# Patient Record
Sex: FEMALE | Race: WHITE | NOT HISPANIC OR LATINO | Employment: OTHER | URBAN - NONMETROPOLITAN AREA
[De-identification: names, ages, dates, MRNs, and addresses within clinical notes are randomized per-mention and may not be internally consistent; named-entity substitution may affect disease eponyms.]

---

## 2017-01-09 ENCOUNTER — TRANSCRIBE ORDERS (OUTPATIENT)
Dept: ADMINISTRATIVE | Facility: HOSPITAL | Age: 57
End: 2017-01-09

## 2017-01-09 DIAGNOSIS — Z13.9 SCREENING: Primary | ICD-10-CM

## 2017-01-26 ENCOUNTER — HOSPITAL ENCOUNTER (OUTPATIENT)
Dept: MAMMOGRAPHY | Facility: HOSPITAL | Age: 57
Discharge: HOME | End: 2017-01-26
Admitting: NURSE PRACTITIONER

## 2017-01-26 DIAGNOSIS — Z13.9 SCREENING: ICD-10-CM

## 2017-01-26 PROCEDURE — 77063 BREAST TOMOSYNTHESIS BI: CPT

## 2017-01-26 PROCEDURE — G0202 SCR MAMMO BI INCL CAD: HCPCS

## 2017-02-09 ENCOUNTER — TRANSCRIBE ORDERS (OUTPATIENT)
Dept: GENERAL RADIOLOGY | Facility: HOSPITAL | Age: 57
End: 2017-02-09

## 2017-02-09 ENCOUNTER — HOSPITAL ENCOUNTER (OUTPATIENT)
Dept: GENERAL RADIOLOGY | Facility: HOSPITAL | Age: 57
Discharge: HOME | End: 2017-02-09
Admitting: NURSE PRACTITIONER

## 2017-02-09 DIAGNOSIS — R05.9 COUGH: ICD-10-CM

## 2017-02-09 DIAGNOSIS — R05.9 COUGH: Primary | ICD-10-CM

## 2017-02-09 PROCEDURE — 71020 HC CHEST PA AND LATERAL: CPT

## 2017-04-21 ENCOUNTER — TRANSCRIBE ORDERS (OUTPATIENT)
Dept: ULTRASOUND IMAGING | Facility: HOSPITAL | Age: 57
End: 2017-04-21

## 2017-04-21 DIAGNOSIS — R63.4 ABNORMAL WEIGHT LOSS: Primary | ICD-10-CM

## 2017-04-24 ENCOUNTER — APPOINTMENT (OUTPATIENT)
Dept: ULTRASOUND IMAGING | Facility: HOSPITAL | Age: 57
End: 2017-04-24

## 2017-04-28 ENCOUNTER — APPOINTMENT (OUTPATIENT)
Dept: GENERAL RADIOLOGY | Facility: HOSPITAL | Age: 57
End: 2017-04-28

## 2017-04-28 ENCOUNTER — APPOINTMENT (OUTPATIENT)
Dept: ULTRASOUND IMAGING | Facility: HOSPITAL | Age: 57
End: 2017-04-28

## 2017-04-28 ENCOUNTER — APPOINTMENT (OUTPATIENT)
Dept: CT IMAGING | Facility: HOSPITAL | Age: 57
End: 2017-04-28

## 2017-04-28 ENCOUNTER — HOSPITAL ENCOUNTER (INPATIENT)
Facility: HOSPITAL | Age: 57
LOS: 2 days | Discharge: HOME | End: 2017-05-01
Attending: EMERGENCY MEDICINE | Admitting: INTERNAL MEDICINE

## 2017-04-28 DIAGNOSIS — K85.90 ACUTE PANCREATITIS WITHOUT INFECTION OR NECROSIS, UNSPECIFIED PANCREATITIS TYPE: Primary | ICD-10-CM

## 2017-04-28 DIAGNOSIS — K85.00 IDIOPATHIC ACUTE PANCREATITIS WITHOUT INFECTION OR NECROSIS: ICD-10-CM

## 2017-04-28 LAB
ALBUMIN SERPL-MCNC: 3.6 G/DL (ref 3.5–5)
ALBUMIN/GLOB SERPL: 1.1 G/DL (ref 1–2)
ALP SERPL-CCNC: 197 U/L (ref 38–126)
ALT SERPL W P-5'-P-CCNC: 66 U/L (ref 13–69)
AMMONIA BLD-SCNC: <9 UMOL/L (ref 9–30)
ANION GAP SERPL CALCULATED.3IONS-SCNC: 14.9 MMOL/L
AST SERPL-CCNC: 92 U/L (ref 15–46)
BACTERIA UR QL AUTO: ABNORMAL /HPF
BASOPHILS # BLD AUTO: 0.02 10*3/MM3 (ref 0–0.2)
BASOPHILS NFR BLD AUTO: 0.2 % (ref 0–2.5)
BILIRUB SERPL-MCNC: 1.4 MG/DL (ref 0.2–1.3)
BILIRUB UR QL STRIP: NEGATIVE
BUN BLD-MCNC: 15 MG/DL (ref 7–20)
BUN/CREAT SERPL: 18.8 (ref 7.1–23.5)
CALCIUM SPEC-SCNC: 8.7 MG/DL (ref 8.4–10.2)
CHLORIDE SERPL-SCNC: 92 MMOL/L (ref 98–107)
CLARITY UR: CLEAR
CO2 SERPL-SCNC: 25 MMOL/L (ref 26–30)
COLOR UR: YELLOW
CREAT BLD-MCNC: 0.8 MG/DL (ref 0.6–1.3)
DEPRECATED RDW RBC AUTO: 45.7 FL (ref 37–54)
EOSINOPHIL # BLD AUTO: 0.01 10*3/MM3 (ref 0–0.7)
EOSINOPHIL NFR BLD AUTO: 0.1 % (ref 0–7)
ERYTHROCYTE [DISTWIDTH] IN BLOOD BY AUTOMATED COUNT: 12.7 % (ref 11.5–14.5)
GFR SERPL CREATININE-BSD FRML MDRD: 74 ML/MIN/1.73
GLOBULIN UR ELPH-MCNC: 3.4 GM/DL
GLUCOSE BLD-MCNC: 122 MG/DL (ref 74–98)
GLUCOSE BLDC GLUCOMTR-MCNC: 172 MG/DL (ref 70–130)
GLUCOSE UR STRIP-MCNC: NEGATIVE MG/DL
HCT VFR BLD AUTO: 32.6 % (ref 37–47)
HGB BLD-MCNC: 11.8 G/DL (ref 12–16)
HGB UR QL STRIP.AUTO: ABNORMAL
HOLD SPECIMEN: NORMAL
HOLD SPECIMEN: NORMAL
HYALINE CASTS UR QL AUTO: ABNORMAL /LPF
IMM GRANULOCYTES # BLD: 0.02 10*3/MM3 (ref 0–0.06)
IMM GRANULOCYTES NFR BLD: 0.2 % (ref 0–0.6)
INR PPP: 0.93 (ref 0.9–1.1)
KETONES UR QL STRIP: NEGATIVE
LEUKOCYTE ESTERASE UR QL STRIP.AUTO: NEGATIVE
LIPASE SERPL-CCNC: 585 U/L (ref 23–300)
LYMPHOCYTES # BLD AUTO: 1.35 10*3/MM3 (ref 0.6–3.4)
LYMPHOCYTES NFR BLD AUTO: 15.3 % (ref 10–50)
MCH RBC QN AUTO: 35.4 PG (ref 27–31)
MCHC RBC AUTO-ENTMCNC: 36.2 G/DL (ref 30–37)
MCV RBC AUTO: 97.9 FL (ref 81–99)
MONOCYTES # BLD AUTO: 0.31 10*3/MM3 (ref 0–0.9)
MONOCYTES NFR BLD AUTO: 3.5 % (ref 0–12)
NEUTROPHILS # BLD AUTO: 7.13 10*3/MM3 (ref 2–6.9)
NEUTROPHILS NFR BLD AUTO: 80.7 % (ref 37–80)
NITRITE UR QL STRIP: NEGATIVE
NRBC BLD MANUAL-RTO: 0 /100 WBC (ref 0–0)
PH UR STRIP.AUTO: 7 [PH] (ref 5–8)
PLATELET # BLD AUTO: 114 10*3/MM3 (ref 130–400)
PMV BLD AUTO: 10.5 FL (ref 6–12)
POTASSIUM BLD-SCNC: 2.9 MMOL/L (ref 3.5–5.1)
PROT SERPL-MCNC: 7 G/DL (ref 6.3–8.2)
PROT UR QL STRIP: NEGATIVE
PROTHROMBIN TIME: 10.2 SECONDS (ref 9.3–12.1)
RBC # BLD AUTO: 3.33 10*6/MM3 (ref 4.2–5.4)
RBC # UR: ABNORMAL /HPF
REF LAB TEST METHOD: ABNORMAL
SODIUM BLD-SCNC: 129 MMOL/L (ref 137–145)
SODIUM UR-SCNC: 6 MMOL/L (ref 30–90)
SP GR UR STRIP: <=1.005 (ref 1–1.03)
SQUAMOUS #/AREA URNS HPF: ABNORMAL /HPF
TROPONIN I SERPL-MCNC: <0.012 NG/ML (ref 0–0.03)
UROBILINOGEN UR QL STRIP: ABNORMAL
WBC NRBC COR # BLD: 8.84 10*3/MM3 (ref 4.8–10.8)
WBC UR QL AUTO: ABNORMAL /HPF
WHOLE BLOOD HOLD SPECIMEN: NORMAL
WHOLE BLOOD HOLD SPECIMEN: NORMAL

## 2017-04-28 PROCEDURE — 25010000002 ENOXAPARIN PER 10 MG: Performed by: INTERNAL MEDICINE

## 2017-04-28 PROCEDURE — 81001 URINALYSIS AUTO W/SCOPE: CPT | Performed by: NURSE PRACTITIONER

## 2017-04-28 PROCEDURE — G0378 HOSPITAL OBSERVATION PER HR: HCPCS

## 2017-04-28 PROCEDURE — 76705 ECHO EXAM OF ABDOMEN: CPT

## 2017-04-28 PROCEDURE — 84300 ASSAY OF URINE SODIUM: CPT | Performed by: INTERNAL MEDICINE

## 2017-04-28 PROCEDURE — 82140 ASSAY OF AMMONIA: CPT | Performed by: NURSE PRACTITIONER

## 2017-04-28 PROCEDURE — 0 IOPAMIDOL 61 % SOLUTION: Performed by: EMERGENCY MEDICINE

## 2017-04-28 PROCEDURE — 99284 EMERGENCY DEPT VISIT MOD MDM: CPT

## 2017-04-28 PROCEDURE — 83690 ASSAY OF LIPASE: CPT | Performed by: NURSE PRACTITIONER

## 2017-04-28 PROCEDURE — 85610 PROTHROMBIN TIME: CPT | Performed by: NURSE PRACTITIONER

## 2017-04-28 PROCEDURE — 25810000003 SODIUM CHLORIDE 0.9 % WITH KCL 40 MEQ/L 40-0.9 MEQ/L-% SOLUTION: Performed by: INTERNAL MEDICINE

## 2017-04-28 PROCEDURE — 84484 ASSAY OF TROPONIN QUANT: CPT | Performed by: NURSE PRACTITIONER

## 2017-04-28 PROCEDURE — 74177 CT ABD & PELVIS W/CONTRAST: CPT

## 2017-04-28 PROCEDURE — 71010 HC CHEST PA OR AP: CPT

## 2017-04-28 PROCEDURE — 99214 OFFICE O/P EST MOD 30 MIN: CPT | Performed by: SURGERY

## 2017-04-28 PROCEDURE — 93005 ELECTROCARDIOGRAM TRACING: CPT

## 2017-04-28 PROCEDURE — 71020 HC CHEST PA AND LATERAL: CPT

## 2017-04-28 PROCEDURE — 99220 PR INITIAL OBSERVATION CARE/DAY 70 MINUTES: CPT | Performed by: INTERNAL MEDICINE

## 2017-04-28 PROCEDURE — 85025 COMPLETE CBC W/AUTO DIFF WBC: CPT | Performed by: NURSE PRACTITIONER

## 2017-04-28 PROCEDURE — 82962 GLUCOSE BLOOD TEST: CPT

## 2017-04-28 PROCEDURE — 80053 COMPREHEN METABOLIC PANEL: CPT | Performed by: NURSE PRACTITIONER

## 2017-04-28 RX ORDER — LORATADINE 10 MG/1
10 CAPSULE, LIQUID FILLED ORAL NIGHTLY
Status: ON HOLD | COMMUNITY
End: 2018-11-12

## 2017-04-28 RX ORDER — ALBUTEROL SULFATE 90 UG/1
2 AEROSOL, METERED RESPIRATORY (INHALATION) EVERY 6 HOURS PRN
Status: ON HOLD | COMMUNITY
End: 2017-05-01

## 2017-04-28 RX ORDER — SODIUM CHLORIDE 0.9 % (FLUSH) 0.9 %
1-10 SYRINGE (ML) INJECTION AS NEEDED
Status: DISCONTINUED | OUTPATIENT
Start: 2017-04-28 | End: 2017-05-01 | Stop reason: HOSPADM

## 2017-04-28 RX ORDER — METHOCARBAMOL 500 MG/1
750 TABLET, FILM COATED ORAL 3 TIMES DAILY
Status: DISCONTINUED | OUTPATIENT
Start: 2017-04-28 | End: 2017-04-29

## 2017-04-28 RX ORDER — AMLODIPINE BESYLATE 5 MG/1
10 TABLET ORAL DAILY
Status: DISCONTINUED | OUTPATIENT
Start: 2017-04-28 | End: 2017-05-01 | Stop reason: HOSPADM

## 2017-04-28 RX ORDER — ESCITALOPRAM OXALATE 10 MG/1
10 TABLET ORAL DAILY
Status: ON HOLD | COMMUNITY
End: 2018-11-12

## 2017-04-28 RX ORDER — IPRATROPIUM BROMIDE AND ALBUTEROL SULFATE 2.5; .5 MG/3ML; MG/3ML
3 SOLUTION RESPIRATORY (INHALATION)
Status: DISCONTINUED | OUTPATIENT
Start: 2017-04-28 | End: 2017-05-01 | Stop reason: HOSPADM

## 2017-04-28 RX ORDER — ISOSORBIDE MONONITRATE 30 MG/1
30 TABLET, EXTENDED RELEASE ORAL DAILY
Status: DISCONTINUED | OUTPATIENT
Start: 2017-04-28 | End: 2017-05-01 | Stop reason: HOSPADM

## 2017-04-28 RX ORDER — GABAPENTIN 400 MG/1
800 CAPSULE ORAL NIGHTLY
Status: DISCONTINUED | OUTPATIENT
Start: 2017-04-28 | End: 2017-04-29

## 2017-04-28 RX ORDER — NICOTINE 21 MG/24HR
1 PATCH, TRANSDERMAL 24 HOURS TRANSDERMAL EVERY 24 HOURS
Status: DISCONTINUED | OUTPATIENT
Start: 2017-04-28 | End: 2017-05-01 | Stop reason: HOSPADM

## 2017-04-28 RX ORDER — ONDANSETRON 2 MG/ML
4 INJECTION INTRAMUSCULAR; INTRAVENOUS EVERY 6 HOURS PRN
Status: DISCONTINUED | OUTPATIENT
Start: 2017-04-28 | End: 2017-05-01 | Stop reason: HOSPADM

## 2017-04-28 RX ORDER — TRAMADOL HYDROCHLORIDE 50 MG/1
50 TABLET ORAL 2 TIMES DAILY
Status: DISCONTINUED | OUTPATIENT
Start: 2017-04-28 | End: 2017-04-29

## 2017-04-28 RX ORDER — ACETAMINOPHEN 325 MG/1
650 TABLET ORAL EVERY 4 HOURS PRN
Status: DISCONTINUED | OUTPATIENT
Start: 2017-04-28 | End: 2017-05-01 | Stop reason: HOSPADM

## 2017-04-28 RX ORDER — SODIUM CHLORIDE 0.9 % (FLUSH) 0.9 %
10 SYRINGE (ML) INJECTION AS NEEDED
Status: DISCONTINUED | OUTPATIENT
Start: 2017-04-28 | End: 2017-05-01 | Stop reason: HOSPADM

## 2017-04-28 RX ORDER — SODIUM CHLORIDE AND POTASSIUM CHLORIDE 300; 900 MG/100ML; MG/100ML
125 INJECTION, SOLUTION INTRAVENOUS CONTINUOUS
Status: DISCONTINUED | OUTPATIENT
Start: 2017-04-28 | End: 2017-04-30

## 2017-04-28 RX ORDER — POTASSIUM CHLORIDE 750 MG/1
40 CAPSULE, EXTENDED RELEASE ORAL ONCE
Status: COMPLETED | OUTPATIENT
Start: 2017-04-28 | End: 2017-04-28

## 2017-04-28 RX ORDER — OXYCODONE AND ACETAMINOPHEN 10; 325 MG/1; MG/1
1 TABLET ORAL 3 TIMES DAILY
Status: DISCONTINUED | OUTPATIENT
Start: 2017-04-28 | End: 2017-04-29

## 2017-04-28 RX ORDER — OMEPRAZOLE 20 MG/1
20 CAPSULE, DELAYED RELEASE ORAL DAILY
Status: ON HOLD | COMMUNITY
End: 2018-11-12

## 2017-04-28 RX ADMIN — ISOSORBIDE MONONITRATE 30 MG: 30 TABLET, EXTENDED RELEASE ORAL at 18:40

## 2017-04-28 RX ADMIN — SODIUM CHLORIDE 1000 ML: 9 INJECTION, SOLUTION INTRAVENOUS at 12:25

## 2017-04-28 RX ADMIN — GABAPENTIN 800 MG: 400 CAPSULE ORAL at 20:35

## 2017-04-28 RX ADMIN — OXYCODONE HYDROCHLORIDE AND ACETAMINOPHEN 1 TABLET: 10; 325 TABLET ORAL at 20:36

## 2017-04-28 RX ADMIN — SODIUM CHLORIDE AND POTASSIUM CHLORIDE 125 ML/HR: 9; 2.98 INJECTION, SOLUTION INTRAVENOUS at 18:45

## 2017-04-28 RX ADMIN — AMLODIPINE BESYLATE 10 MG: 5 TABLET ORAL at 18:40

## 2017-04-28 RX ADMIN — ENOXAPARIN SODIUM 40 MG: 40 INJECTION SUBCUTANEOUS at 18:39

## 2017-04-28 RX ADMIN — IOPAMIDOL 100 ML: 612 INJECTION, SOLUTION INTRAVENOUS at 13:36

## 2017-04-28 RX ADMIN — POTASSIUM CHLORIDE 40 MEQ: 750 CAPSULE, EXTENDED RELEASE ORAL at 13:55

## 2017-04-28 RX ADMIN — METHOCARBAMOL 750 MG: 500 TABLET ORAL at 20:35

## 2017-04-28 RX ADMIN — TRAMADOL HYDROCHLORIDE 50 MG: 50 TABLET, COATED ORAL at 18:39

## 2017-04-29 LAB
ALBUMIN SERPL-MCNC: 3.1 G/DL (ref 3.5–5)
ALBUMIN/GLOB SERPL: 1 G/DL (ref 1–2)
ALP SERPL-CCNC: 163 U/L (ref 38–126)
ALT SERPL W P-5'-P-CCNC: 51 U/L (ref 13–69)
ANION GAP SERPL CALCULATED.3IONS-SCNC: 12.4 MMOL/L
AST SERPL-CCNC: 62 U/L (ref 15–46)
BASOPHILS # BLD AUTO: 0.01 10*3/MM3 (ref 0–0.2)
BASOPHILS NFR BLD AUTO: 0.2 % (ref 0–2.5)
BILIRUB SERPL-MCNC: 0.6 MG/DL (ref 0.2–1.3)
BUN BLD-MCNC: 4 MG/DL (ref 7–20)
BUN/CREAT SERPL: 8 (ref 7.1–23.5)
CALCIUM SPEC-SCNC: 7.6 MG/DL (ref 8.4–10.2)
CHLORIDE SERPL-SCNC: 105 MMOL/L (ref 98–107)
CO2 SERPL-SCNC: 23 MMOL/L (ref 26–30)
CREAT BLD-MCNC: 0.5 MG/DL (ref 0.6–1.3)
DEPRECATED RDW RBC AUTO: 50.3 FL (ref 37–54)
EOSINOPHIL # BLD AUTO: 0.05 10*3/MM3 (ref 0–0.7)
EOSINOPHIL NFR BLD AUTO: 0.8 % (ref 0–7)
ERYTHROCYTE [DISTWIDTH] IN BLOOD BY AUTOMATED COUNT: 13.2 % (ref 11.5–14.5)
GFR SERPL CREATININE-BSD FRML MDRD: 128 ML/MIN/1.73
GLOBULIN UR ELPH-MCNC: 3 GM/DL
GLUCOSE BLD-MCNC: 122 MG/DL (ref 74–98)
HCT VFR BLD AUTO: 29.8 % (ref 37–47)
HEMOCCULT STL QL: NEGATIVE
HGB BLD-MCNC: 9.9 G/DL (ref 12–16)
IMM GRANULOCYTES # BLD: 0.02 10*3/MM3 (ref 0–0.06)
IMM GRANULOCYTES NFR BLD: 0.3 % (ref 0–0.6)
LIPASE SERPL-CCNC: 465 U/L (ref 23–300)
LYMPHOCYTES # BLD AUTO: 1.77 10*3/MM3 (ref 0.6–3.4)
LYMPHOCYTES NFR BLD AUTO: 27.4 % (ref 10–50)
MCH RBC QN AUTO: 34.4 PG (ref 27–31)
MCHC RBC AUTO-ENTMCNC: 33.2 G/DL (ref 30–37)
MCV RBC AUTO: 103.5 FL (ref 81–99)
MONOCYTES # BLD AUTO: 0.32 10*3/MM3 (ref 0–0.9)
MONOCYTES NFR BLD AUTO: 4.9 % (ref 0–12)
NEUTROPHILS # BLD AUTO: 4.3 10*3/MM3 (ref 2–6.9)
NEUTROPHILS NFR BLD AUTO: 66.4 % (ref 37–80)
NRBC BLD MANUAL-RTO: 0 /100 WBC (ref 0–0)
PLATELET # BLD AUTO: 90 10*3/MM3 (ref 130–400)
PMV BLD AUTO: 10.7 FL (ref 6–12)
POTASSIUM BLD-SCNC: 3.4 MMOL/L (ref 3.5–5.1)
PROT SERPL-MCNC: 6.1 G/DL (ref 6.3–8.2)
RBC # BLD AUTO: 2.88 10*6/MM3 (ref 4.2–5.4)
SODIUM BLD-SCNC: 137 MMOL/L (ref 137–145)
TSH SERPL DL<=0.05 MIU/L-ACNC: 1.25 MIU/ML (ref 0.47–4.68)
WBC NRBC COR # BLD: 6.47 10*3/MM3 (ref 4.8–10.8)
WBC STL QL MICRO: NORMAL

## 2017-04-29 PROCEDURE — 85025 COMPLETE CBC W/AUTO DIFF WBC: CPT | Performed by: INTERNAL MEDICINE

## 2017-04-29 PROCEDURE — 87046 STOOL CULTR AEROBIC BACT EA: CPT | Performed by: INTERNAL MEDICINE

## 2017-04-29 PROCEDURE — 82270 OCCULT BLOOD FECES: CPT | Performed by: INTERNAL MEDICINE

## 2017-04-29 PROCEDURE — 86704 HEP B CORE ANTIBODY TOTAL: CPT | Performed by: INTERNAL MEDICINE

## 2017-04-29 PROCEDURE — 94799 UNLISTED PULMONARY SVC/PX: CPT

## 2017-04-29 PROCEDURE — 86803 HEPATITIS C AB TEST: CPT | Performed by: INTERNAL MEDICINE

## 2017-04-29 PROCEDURE — 25810000003 SODIUM CHLORIDE 0.9 % WITH KCL 40 MEQ/L 40-0.9 MEQ/L-% SOLUTION: Performed by: INTERNAL MEDICINE

## 2017-04-29 PROCEDURE — 86706 HEP B SURFACE ANTIBODY: CPT | Performed by: INTERNAL MEDICINE

## 2017-04-29 PROCEDURE — 87340 HEPATITIS B SURFACE AG IA: CPT | Performed by: INTERNAL MEDICINE

## 2017-04-29 PROCEDURE — 99214 OFFICE O/P EST MOD 30 MIN: CPT | Performed by: SURGERY

## 2017-04-29 PROCEDURE — 25010000002 ENOXAPARIN PER 10 MG: Performed by: INTERNAL MEDICINE

## 2017-04-29 PROCEDURE — 99226 PR SBSQ OBSERVATION CARE/DAY 35 MINUTES: CPT | Performed by: INTERNAL MEDICINE

## 2017-04-29 PROCEDURE — 86705 HEP B CORE ANTIBODY IGM: CPT | Performed by: INTERNAL MEDICINE

## 2017-04-29 PROCEDURE — 80053 COMPREHEN METABOLIC PANEL: CPT | Performed by: INTERNAL MEDICINE

## 2017-04-29 PROCEDURE — 25010000002 METHYLPREDNISOLONE PER 40 MG: Performed by: INTERNAL MEDICINE

## 2017-04-29 PROCEDURE — 83690 ASSAY OF LIPASE: CPT | Performed by: INTERNAL MEDICINE

## 2017-04-29 PROCEDURE — 87205 SMEAR GRAM STAIN: CPT | Performed by: INTERNAL MEDICINE

## 2017-04-29 PROCEDURE — 87350 HEPATITIS BE AG IA: CPT | Performed by: INTERNAL MEDICINE

## 2017-04-29 PROCEDURE — 82103 ALPHA-1-ANTITRYPSIN TOTAL: CPT | Performed by: INTERNAL MEDICINE

## 2017-04-29 PROCEDURE — 87045 FECES CULTURE AEROBIC BACT: CPT | Performed by: INTERNAL MEDICINE

## 2017-04-29 PROCEDURE — G0378 HOSPITAL OBSERVATION PER HR: HCPCS

## 2017-04-29 PROCEDURE — 84443 ASSAY THYROID STIM HORMONE: CPT | Performed by: INTERNAL MEDICINE

## 2017-04-29 PROCEDURE — 86707 HEPATITIS BE ANTIBODY: CPT | Performed by: INTERNAL MEDICINE

## 2017-04-29 RX ORDER — ALBUTEROL SULFATE 2.5 MG/3ML
2.5 SOLUTION RESPIRATORY (INHALATION) EVERY 6 HOURS PRN
Status: DISCONTINUED | OUTPATIENT
Start: 2017-04-29 | End: 2017-05-01 | Stop reason: HOSPADM

## 2017-04-29 RX ORDER — OXYCODONE AND ACETAMINOPHEN 10; 325 MG/1; MG/1
1 TABLET ORAL EVERY 8 HOURS
Status: DISCONTINUED | OUTPATIENT
Start: 2017-04-29 | End: 2017-04-29

## 2017-04-29 RX ORDER — OXYCODONE AND ACETAMINOPHEN 10; 325 MG/1; MG/1
1 TABLET ORAL EVERY 6 HOURS
Status: DISCONTINUED | OUTPATIENT
Start: 2017-04-29 | End: 2017-05-01 | Stop reason: HOSPADM

## 2017-04-29 RX ORDER — METHOCARBAMOL 500 MG/1
500 TABLET, FILM COATED ORAL 3 TIMES DAILY
Status: DISCONTINUED | OUTPATIENT
Start: 2017-04-29 | End: 2017-05-01 | Stop reason: HOSPADM

## 2017-04-29 RX ORDER — PANTOPRAZOLE SODIUM 40 MG/1
40 TABLET, DELAYED RELEASE ORAL
Status: DISCONTINUED | OUTPATIENT
Start: 2017-04-29 | End: 2017-05-01 | Stop reason: HOSPADM

## 2017-04-29 RX ORDER — GABAPENTIN 400 MG/1
800 CAPSULE ORAL EVERY 8 HOURS SCHEDULED
Status: DISCONTINUED | OUTPATIENT
Start: 2017-04-29 | End: 2017-05-01 | Stop reason: HOSPADM

## 2017-04-29 RX ORDER — CETIRIZINE HYDROCHLORIDE 10 MG/1
10 TABLET ORAL DAILY
Status: DISCONTINUED | OUTPATIENT
Start: 2017-04-29 | End: 2017-05-01 | Stop reason: HOSPADM

## 2017-04-29 RX ORDER — METHYLPREDNISOLONE SODIUM SUCCINATE 40 MG/ML
40 INJECTION, POWDER, LYOPHILIZED, FOR SOLUTION INTRAMUSCULAR; INTRAVENOUS EVERY 8 HOURS
Status: DISCONTINUED | OUTPATIENT
Start: 2017-04-29 | End: 2017-04-30

## 2017-04-29 RX ORDER — TRAMADOL HYDROCHLORIDE 50 MG/1
50 TABLET ORAL EVERY 8 HOURS
Status: DISCONTINUED | OUTPATIENT
Start: 2017-04-29 | End: 2017-05-01 | Stop reason: HOSPADM

## 2017-04-29 RX ORDER — ESCITALOPRAM OXALATE 10 MG/1
10 TABLET ORAL DAILY
Status: DISCONTINUED | OUTPATIENT
Start: 2017-04-29 | End: 2017-05-01 | Stop reason: HOSPADM

## 2017-04-29 RX ADMIN — METHYLPREDNISOLONE SODIUM SUCCINATE 40 MG: 40 INJECTION, POWDER, FOR SOLUTION INTRAMUSCULAR; INTRAVENOUS at 18:19

## 2017-04-29 RX ADMIN — NICOTINE 1 PATCH: 21 PATCH TRANSDERMAL at 21:05

## 2017-04-29 RX ADMIN — METHOCARBAMOL 500 MG: 500 TABLET ORAL at 16:18

## 2017-04-29 RX ADMIN — AMLODIPINE BESYLATE 10 MG: 5 TABLET ORAL at 08:09

## 2017-04-29 RX ADMIN — TRAMADOL HYDROCHLORIDE 50 MG: 50 TABLET, COATED ORAL at 08:09

## 2017-04-29 RX ADMIN — CETIRIZINE HYDROCHLORIDE 10 MG: 10 TABLET, FILM COATED ORAL at 10:55

## 2017-04-29 RX ADMIN — TRAMADOL HYDROCHLORIDE 50 MG: 50 TABLET, COATED ORAL at 17:35

## 2017-04-29 RX ADMIN — GABAPENTIN 800 MG: 400 CAPSULE ORAL at 10:55

## 2017-04-29 RX ADMIN — ESCITALOPRAM OXALATE 10 MG: 10 TABLET ORAL at 10:55

## 2017-04-29 RX ADMIN — METHOCARBAMOL 750 MG: 500 TABLET ORAL at 08:09

## 2017-04-29 RX ADMIN — Medication 10 ML: at 10:56

## 2017-04-29 RX ADMIN — TRAMADOL HYDROCHLORIDE 50 MG: 50 TABLET, COATED ORAL at 23:12

## 2017-04-29 RX ADMIN — SODIUM CHLORIDE AND POTASSIUM CHLORIDE 125 ML/HR: 9; 2.98 INJECTION, SOLUTION INTRAVENOUS at 03:36

## 2017-04-29 RX ADMIN — OXYCODONE HYDROCHLORIDE AND ACETAMINOPHEN 1 TABLET: 10; 325 TABLET ORAL at 10:55

## 2017-04-29 RX ADMIN — ISOSORBIDE MONONITRATE 30 MG: 30 TABLET, EXTENDED RELEASE ORAL at 08:09

## 2017-04-29 RX ADMIN — IPRATROPIUM BROMIDE AND ALBUTEROL SULFATE 3 ML: .5; 3 SOLUTION RESPIRATORY (INHALATION) at 06:51

## 2017-04-29 RX ADMIN — OXYCODONE HYDROCHLORIDE AND ACETAMINOPHEN 1 TABLET: 10; 325 TABLET ORAL at 16:19

## 2017-04-29 RX ADMIN — METHYLPREDNISOLONE SODIUM SUCCINATE 40 MG: 40 INJECTION, POWDER, FOR SOLUTION INTRAMUSCULAR; INTRAVENOUS at 10:57

## 2017-04-29 RX ADMIN — IPRATROPIUM BROMIDE AND ALBUTEROL SULFATE 3 ML: .5; 3 SOLUTION RESPIRATORY (INHALATION) at 13:03

## 2017-04-29 RX ADMIN — SODIUM CHLORIDE AND POTASSIUM CHLORIDE 125 ML/HR: 9; 2.98 INJECTION, SOLUTION INTRAVENOUS at 10:56

## 2017-04-29 RX ADMIN — OXYCODONE HYDROCHLORIDE AND ACETAMINOPHEN 1 TABLET: 10; 325 TABLET ORAL at 23:12

## 2017-04-29 RX ADMIN — ENOXAPARIN SODIUM 40 MG: 40 INJECTION SUBCUTANEOUS at 18:19

## 2017-04-29 RX ADMIN — OXYCODONE HYDROCHLORIDE AND ACETAMINOPHEN 1 TABLET: 10; 325 TABLET ORAL at 03:54

## 2017-04-29 RX ADMIN — PANTOPRAZOLE SODIUM 40 MG: 40 TABLET, DELAYED RELEASE ORAL at 10:55

## 2017-04-29 RX ADMIN — IPRATROPIUM BROMIDE AND ALBUTEROL SULFATE 3 ML: .5; 3 SOLUTION RESPIRATORY (INHALATION) at 20:44

## 2017-04-29 RX ADMIN — GABAPENTIN 800 MG: 400 CAPSULE ORAL at 18:19

## 2017-04-29 RX ADMIN — METHOCARBAMOL 500 MG: 500 TABLET ORAL at 21:06

## 2017-04-29 RX ADMIN — SODIUM CHLORIDE AND POTASSIUM CHLORIDE 125 ML/HR: 9; 2.98 INJECTION, SOLUTION INTRAVENOUS at 22:00

## 2017-04-30 ENCOUNTER — ANESTHESIA EVENT (OUTPATIENT)
Dept: GASTROENTEROLOGY | Facility: HOSPITAL | Age: 57
End: 2017-04-30

## 2017-04-30 ENCOUNTER — ANESTHESIA (OUTPATIENT)
Dept: GASTROENTEROLOGY | Facility: HOSPITAL | Age: 57
End: 2017-04-30

## 2017-04-30 LAB
A1AT SERPL-MCNC: 161 MG/DL (ref 90–200)
ALBUMIN SERPL-MCNC: 3.4 G/DL (ref 3.5–5)
ALBUMIN/GLOB SERPL: 1.1 G/DL (ref 1–2)
ALP SERPL-CCNC: 139 U/L (ref 38–126)
ALT SERPL W P-5'-P-CCNC: 49 U/L (ref 13–69)
ANION GAP SERPL CALCULATED.3IONS-SCNC: 15 MMOL/L
AST SERPL-CCNC: 44 U/L (ref 15–46)
BACTERIA SPEC AEROBE CULT: NORMAL
BASOPHILS # BLD AUTO: 0.01 10*3/MM3 (ref 0–0.2)
BASOPHILS NFR BLD AUTO: 0.1 % (ref 0–2.5)
BILIRUB SERPL-MCNC: 0.4 MG/DL (ref 0.2–1.3)
BUN BLD-MCNC: 3 MG/DL (ref 7–20)
BUN/CREAT SERPL: 7.5 (ref 7.1–23.5)
CALCIUM SPEC-SCNC: 7.2 MG/DL (ref 8.4–10.2)
CHLORIDE SERPL-SCNC: 98 MMOL/L (ref 98–107)
CO2 SERPL-SCNC: 29 MMOL/L (ref 26–30)
CREAT BLD-MCNC: 0.4 MG/DL (ref 0.6–1.3)
DEPRECATED RDW RBC AUTO: 47 FL (ref 37–54)
EOSINOPHIL # BLD AUTO: 0 10*3/MM3 (ref 0–0.7)
EOSINOPHIL NFR BLD AUTO: 0 % (ref 0–7)
ERYTHROCYTE [DISTWIDTH] IN BLOOD BY AUTOMATED COUNT: 12.8 % (ref 11.5–14.5)
GFR SERPL CREATININE-BSD FRML MDRD: >150 ML/MIN/1.73
GLOBULIN UR ELPH-MCNC: 3 GM/DL
GLUCOSE BLD-MCNC: 247 MG/DL (ref 74–98)
HCT VFR BLD AUTO: 29.4 % (ref 37–47)
HGB BLD-MCNC: 10 G/DL (ref 12–16)
IMM GRANULOCYTES # BLD: 0.12 10*3/MM3 (ref 0–0.06)
IMM GRANULOCYTES NFR BLD: 1.4 % (ref 0–0.6)
LIPASE SERPL-CCNC: 246 U/L (ref 23–300)
LYMPHOCYTES # BLD AUTO: 0.46 10*3/MM3 (ref 0.6–3.4)
LYMPHOCYTES NFR BLD AUTO: 5.3 % (ref 10–50)
MCH RBC QN AUTO: 34.7 PG (ref 27–31)
MCHC RBC AUTO-ENTMCNC: 34 G/DL (ref 30–37)
MCV RBC AUTO: 102.1 FL (ref 81–99)
MONOCYTES # BLD AUTO: 0.19 10*3/MM3 (ref 0–0.9)
MONOCYTES NFR BLD AUTO: 2.2 % (ref 0–12)
NEUTROPHILS # BLD AUTO: 7.89 10*3/MM3 (ref 2–6.9)
NEUTROPHILS NFR BLD AUTO: 91 % (ref 37–80)
NRBC BLD MANUAL-RTO: 0 /100 WBC (ref 0–0)
PLATELET # BLD AUTO: 109 10*3/MM3 (ref 130–400)
PMV BLD AUTO: 10.4 FL (ref 6–12)
POTASSIUM BLD-SCNC: 3 MMOL/L (ref 3.5–5.1)
PROT SERPL-MCNC: 6.4 G/DL (ref 6.3–8.2)
RBC # BLD AUTO: 2.88 10*6/MM3 (ref 4.2–5.4)
SODIUM BLD-SCNC: 139 MMOL/L (ref 137–145)
WBC NRBC COR # BLD: 8.67 10*3/MM3 (ref 4.8–10.8)

## 2017-04-30 PROCEDURE — 80053 COMPREHEN METABOLIC PANEL: CPT | Performed by: INTERNAL MEDICINE

## 2017-04-30 PROCEDURE — 85025 COMPLETE CBC W/AUTO DIFF WBC: CPT | Performed by: INTERNAL MEDICINE

## 2017-04-30 PROCEDURE — 0DB98ZX EXCISION OF DUODENUM, VIA NATURAL OR ARTIFICIAL OPENING ENDOSCOPIC, DIAGNOSTIC: ICD-10-PCS | Performed by: SURGERY

## 2017-04-30 PROCEDURE — 88305 TISSUE EXAM BY PATHOLOGIST: CPT | Performed by: SURGERY

## 2017-04-30 PROCEDURE — 25010000002 METHYLPREDNISOLONE PER 40 MG: Performed by: INTERNAL MEDICINE

## 2017-04-30 PROCEDURE — 83690 ASSAY OF LIPASE: CPT | Performed by: INTERNAL MEDICINE

## 2017-04-30 PROCEDURE — 94799 UNLISTED PULMONARY SVC/PX: CPT

## 2017-04-30 PROCEDURE — 25810000003 SODIUM CHLORIDE 0.9 % WITH KCL 40 MEQ/L 40-0.9 MEQ/L-% SOLUTION: Performed by: INTERNAL MEDICINE

## 2017-04-30 PROCEDURE — 0DB68ZX EXCISION OF STOMACH, VIA NATURAL OR ARTIFICIAL OPENING ENDOSCOPIC, DIAGNOSTIC: ICD-10-PCS | Performed by: SURGERY

## 2017-04-30 PROCEDURE — 25010000002 PROPOFOL 1000 MG/ML EMULSION

## 2017-04-30 PROCEDURE — 25010000002 ENOXAPARIN PER 10 MG: Performed by: INTERNAL MEDICINE

## 2017-04-30 PROCEDURE — 99225 PR SBSQ OBSERVATION CARE/DAY 25 MINUTES: CPT | Performed by: INTERNAL MEDICINE

## 2017-04-30 RX ORDER — DEXTROSE AND SODIUM CHLORIDE 5; .45 G/100ML; G/100ML
100 INJECTION, SOLUTION INTRAVENOUS CONTINUOUS
Status: DISCONTINUED | OUTPATIENT
Start: 2017-04-30 | End: 2017-05-01 | Stop reason: HOSPADM

## 2017-04-30 RX ORDER — SODIUM CHLORIDE 0.9 % (FLUSH) 0.9 %
1-10 SYRINGE (ML) INJECTION AS NEEDED
Status: DISCONTINUED | OUTPATIENT
Start: 2017-04-30 | End: 2017-05-01 | Stop reason: HOSPADM

## 2017-04-30 RX ORDER — ONDANSETRON 4 MG/1
4 TABLET, ORALLY DISINTEGRATING ORAL EVERY 6 HOURS PRN
Status: DISCONTINUED | OUTPATIENT
Start: 2017-04-30 | End: 2017-05-01 | Stop reason: HOSPADM

## 2017-04-30 RX ORDER — SODIUM CHLORIDE, SODIUM LACTATE, POTASSIUM CHLORIDE, CALCIUM CHLORIDE 600; 310; 30; 20 MG/100ML; MG/100ML; MG/100ML; MG/100ML
1000 INJECTION, SOLUTION INTRAVENOUS CONTINUOUS PRN
Status: DISCONTINUED | OUTPATIENT
Start: 2017-04-30 | End: 2017-05-01 | Stop reason: HOSPADM

## 2017-04-30 RX ORDER — ONDANSETRON 2 MG/ML
4 INJECTION INTRAMUSCULAR; INTRAVENOUS EVERY 6 HOURS PRN
Status: DISCONTINUED | OUTPATIENT
Start: 2017-04-30 | End: 2017-05-01 | Stop reason: HOSPADM

## 2017-04-30 RX ORDER — METHOCARBAMOL 500 MG/1
500 TABLET, FILM COATED ORAL EVERY 8 HOURS PRN
Status: DISCONTINUED | OUTPATIENT
Start: 2017-04-30 | End: 2017-05-01 | Stop reason: HOSPADM

## 2017-04-30 RX ORDER — KETOROLAC TROMETHAMINE 30 MG/ML
30 INJECTION, SOLUTION INTRAMUSCULAR; INTRAVENOUS EVERY 6 HOURS PRN
Status: DISCONTINUED | OUTPATIENT
Start: 2017-04-30 | End: 2017-05-01 | Stop reason: HOSPADM

## 2017-04-30 RX ORDER — SODIUM CHLORIDE, SODIUM LACTATE, POTASSIUM CHLORIDE, CALCIUM CHLORIDE 600; 310; 30; 20 MG/100ML; MG/100ML; MG/100ML; MG/100ML
INJECTION, SOLUTION INTRAVENOUS CONTINUOUS PRN
Status: DISCONTINUED | OUTPATIENT
Start: 2017-04-30 | End: 2017-04-30 | Stop reason: SURG

## 2017-04-30 RX ORDER — ONDANSETRON 2 MG/ML
4 INJECTION INTRAMUSCULAR; INTRAVENOUS ONCE AS NEEDED
Status: DISCONTINUED | OUTPATIENT
Start: 2017-04-30 | End: 2017-05-01 | Stop reason: HOSPADM

## 2017-04-30 RX ORDER — ONDANSETRON 4 MG/1
4 TABLET, FILM COATED ORAL EVERY 6 HOURS PRN
Status: DISCONTINUED | OUTPATIENT
Start: 2017-04-30 | End: 2017-05-01 | Stop reason: HOSPADM

## 2017-04-30 RX ORDER — PROMETHAZINE HYDROCHLORIDE 25 MG/ML
12.5 INJECTION, SOLUTION INTRAMUSCULAR; INTRAVENOUS EVERY 4 HOURS PRN
Status: DISCONTINUED | OUTPATIENT
Start: 2017-04-30 | End: 2017-05-01 | Stop reason: HOSPADM

## 2017-04-30 RX ORDER — METHOCARBAMOL 500 MG/1
500 TABLET, FILM COATED ORAL 3 TIMES DAILY PRN
Refills: 1 | COMMUNITY
Start: 2017-04-20 | End: 2019-02-13 | Stop reason: HOSPADM

## 2017-04-30 RX ORDER — SODIUM CHLORIDE 0.9 % (FLUSH) 0.9 %
3 SYRINGE (ML) INJECTION AS NEEDED
Status: DISCONTINUED | OUTPATIENT
Start: 2017-04-30 | End: 2017-05-01 | Stop reason: HOSPADM

## 2017-04-30 RX ORDER — PROPOFOL 10 MG/ML
INJECTION, EMULSION INTRAVENOUS AS NEEDED
Status: DISCONTINUED | OUTPATIENT
Start: 2017-04-30 | End: 2017-04-30 | Stop reason: SURG

## 2017-04-30 RX ORDER — POTASSIUM CHLORIDE 20 MEQ/1
40 TABLET, EXTENDED RELEASE ORAL ONCE
Status: COMPLETED | OUTPATIENT
Start: 2017-04-30 | End: 2017-04-30

## 2017-04-30 RX ORDER — PROMETHAZINE HYDROCHLORIDE 25 MG/ML
12.5 INJECTION, SOLUTION INTRAMUSCULAR; INTRAVENOUS EVERY 6 HOURS PRN
Status: DISCONTINUED | OUTPATIENT
Start: 2017-04-30 | End: 2017-05-01 | Stop reason: HOSPADM

## 2017-04-30 RX ORDER — METHOCARBAMOL 500 MG/1
500 TABLET, FILM COATED ORAL AS NEEDED
Status: DISCONTINUED | OUTPATIENT
Start: 2017-04-30 | End: 2017-04-30 | Stop reason: DRUGHIGH

## 2017-04-30 RX ADMIN — PANTOPRAZOLE SODIUM 40 MG: 40 TABLET, DELAYED RELEASE ORAL at 05:46

## 2017-04-30 RX ADMIN — POTASSIUM CHLORIDE 40 MEQ: 20 TABLET, EXTENDED RELEASE ORAL at 13:17

## 2017-04-30 RX ADMIN — METHOCARBAMOL 500 MG: 500 TABLET ORAL at 16:15

## 2017-04-30 RX ADMIN — METHOCARBAMOL 500 MG: 500 TABLET ORAL at 09:16

## 2017-04-30 RX ADMIN — DEXTROSE AND SODIUM CHLORIDE 100 ML/HR: 5; 450 INJECTION, SOLUTION INTRAVENOUS at 09:15

## 2017-04-30 RX ADMIN — SODIUM CHLORIDE AND POTASSIUM CHLORIDE 125 ML/HR: 9; 2.98 INJECTION, SOLUTION INTRAVENOUS at 03:37

## 2017-04-30 RX ADMIN — GABAPENTIN 800 MG: 400 CAPSULE ORAL at 03:30

## 2017-04-30 RX ADMIN — IPRATROPIUM BROMIDE AND ALBUTEROL SULFATE 3 ML: .5; 3 SOLUTION RESPIRATORY (INHALATION) at 06:43

## 2017-04-30 RX ADMIN — ESCITALOPRAM OXALATE 10 MG: 10 TABLET ORAL at 09:16

## 2017-04-30 RX ADMIN — IPRATROPIUM BROMIDE AND ALBUTEROL SULFATE 3 ML: .5; 3 SOLUTION RESPIRATORY (INHALATION) at 11:22

## 2017-04-30 RX ADMIN — CETIRIZINE HYDROCHLORIDE 10 MG: 10 TABLET, FILM COATED ORAL at 09:17

## 2017-04-30 RX ADMIN — GABAPENTIN 800 MG: 400 CAPSULE ORAL at 10:48

## 2017-04-30 RX ADMIN — OXYCODONE HYDROCHLORIDE AND ACETAMINOPHEN 1 TABLET: 10; 325 TABLET ORAL at 23:05

## 2017-04-30 RX ADMIN — GABAPENTIN 800 MG: 400 CAPSULE ORAL at 18:10

## 2017-04-30 RX ADMIN — TRAMADOL HYDROCHLORIDE 50 MG: 50 TABLET, COATED ORAL at 23:05

## 2017-04-30 RX ADMIN — DEXTROSE AND SODIUM CHLORIDE 100 ML/HR: 5; 450 INJECTION, SOLUTION INTRAVENOUS at 20:16

## 2017-04-30 RX ADMIN — AMLODIPINE BESYLATE 10 MG: 5 TABLET ORAL at 09:17

## 2017-04-30 RX ADMIN — NICOTINE 1 PATCH: 21 PATCH TRANSDERMAL at 20:15

## 2017-04-30 RX ADMIN — OXYCODONE HYDROCHLORIDE AND ACETAMINOPHEN 1 TABLET: 10; 325 TABLET ORAL at 10:49

## 2017-04-30 RX ADMIN — OXYCODONE HYDROCHLORIDE AND ACETAMINOPHEN 1 TABLET: 10; 325 TABLET ORAL at 16:15

## 2017-04-30 RX ADMIN — METHOCARBAMOL 500 MG: 500 TABLET ORAL at 20:14

## 2017-04-30 RX ADMIN — SODIUM CHLORIDE, POTASSIUM CHLORIDE, SODIUM LACTATE AND CALCIUM CHLORIDE: 600; 310; 30; 20 INJECTION, SOLUTION INTRAVENOUS at 07:59

## 2017-04-30 RX ADMIN — OXYCODONE HYDROCHLORIDE AND ACETAMINOPHEN 1 TABLET: 10; 325 TABLET ORAL at 05:46

## 2017-04-30 RX ADMIN — ENOXAPARIN SODIUM 40 MG: 40 INJECTION SUBCUTANEOUS at 18:11

## 2017-04-30 RX ADMIN — TRAMADOL HYDROCHLORIDE 50 MG: 50 TABLET, COATED ORAL at 09:16

## 2017-04-30 RX ADMIN — LIDOCAINE HYDROCHLORIDE 100 MG: 20 INJECTION, SOLUTION INTRAVENOUS at 07:59

## 2017-04-30 RX ADMIN — TRAMADOL HYDROCHLORIDE 50 MG: 50 TABLET, COATED ORAL at 16:15

## 2017-04-30 RX ADMIN — METHYLPREDNISOLONE SODIUM SUCCINATE 40 MG: 40 INJECTION, POWDER, FOR SOLUTION INTRAMUSCULAR; INTRAVENOUS at 10:49

## 2017-04-30 RX ADMIN — PROPOFOL 150 MG: 10 INJECTION, EMULSION INTRAVENOUS at 07:59

## 2017-04-30 RX ADMIN — METHYLPREDNISOLONE SODIUM SUCCINATE 40 MG: 40 INJECTION, POWDER, FOR SOLUTION INTRAMUSCULAR; INTRAVENOUS at 03:27

## 2017-04-30 RX ADMIN — IPRATROPIUM BROMIDE AND ALBUTEROL SULFATE 3 ML: .5; 3 SOLUTION RESPIRATORY (INHALATION) at 19:30

## 2017-04-30 RX ADMIN — ISOSORBIDE MONONITRATE 30 MG: 30 TABLET, EXTENDED RELEASE ORAL at 09:16

## 2017-04-30 NOTE — ANESTHESIA PREPROCEDURE EVALUATION
Anesthesia Evaluation     Patient summary reviewed and Nursing notes reviewed   NPO Status: > 8 hours   Airway   Mallampati: I  TM distance: >3 FB  Neck ROM: full  no difficulty expected  Dental    (+) upper dentures    Pulmonary - normal exam   (+) a smoker Current, COPD moderate,   Cardiovascular - normal exam    (+) hypertension, CAD,       Neuro/Psych  (+) psychiatric history,    GI/Hepatic/Renal/Endo    (+)  liver disease,     Musculoskeletal     Abdominal    Substance History - negative use     OB/GYN negative ob/gyn ROS         Other   (+) arthritis                                 Anesthesia Plan    ASA 3     MAC   (Risks and benefits discussed including risk of aspiration, recall and dental damage. All patient questions answered. Will continue with POC.)  intravenous induction   Anesthetic plan and risks discussed with patient.

## 2017-04-30 NOTE — ANESTHESIA POSTPROCEDURE EVALUATION
Patient: Ce Reynolds    Procedure Summary     Date Anesthesia Start Anesthesia Stop Room / Location    04/30/17 0753 0817 [facility] ENDOSCOPY 2 / [facility] ENDOSCOPY       Procedure Diagnosis Surgeon Provider    ESOPHAGOGASTRODUODENOSCOPY WITH BX (N/A Esophagus) Idiopathic acute pancreatitis without infection or necrosis  (Idiopathic acute pancreatitis without infection or necrosis [K85.00]) MD Italo Cadet CRNA          Anesthesia Type: MAC  Last vitals  BP      Temp      Pulse     Resp      SpO2        Post Anesthesia Care and Evaluation    Patient location during evaluation: PACU  Patient participation: complete - patient participated  Level of consciousness: awake and alert  Pain management: adequate  Airway patency: patent  Anesthetic complications: No anesthetic complications  PONV Status: none  Cardiovascular status: acceptable  Respiratory status: acceptable  Hydration status: acceptable

## 2017-05-01 ENCOUNTER — APPOINTMENT (OUTPATIENT)
Dept: NUCLEAR MEDICINE | Facility: HOSPITAL | Age: 57
End: 2017-05-01

## 2017-05-01 VITALS
HEART RATE: 85 BPM | OXYGEN SATURATION: 96 % | HEIGHT: 59 IN | SYSTOLIC BLOOD PRESSURE: 135 MMHG | RESPIRATION RATE: 16 BRPM | BODY MASS INDEX: 17.96 KG/M2 | WEIGHT: 89.07 LBS | TEMPERATURE: 98.7 F | DIASTOLIC BLOOD PRESSURE: 75 MMHG

## 2017-05-01 PROBLEM — E87.1 HYPONATREMIA: Status: ACTIVE | Noted: 2017-05-01

## 2017-05-01 PROBLEM — K76.0 FATTY LIVER: Status: ACTIVE | Noted: 2017-05-01

## 2017-05-01 PROBLEM — J96.01 ACUTE RESPIRATORY FAILURE WITH HYPOXIA (HCC): Status: ACTIVE | Noted: 2017-05-01

## 2017-05-01 PROBLEM — J44.1 COPD EXACERBATION (HCC): Status: ACTIVE | Noted: 2017-05-01

## 2017-05-01 PROBLEM — J96.01 ACUTE RESPIRATORY FAILURE WITH HYPOXIA (HCC): Status: RESOLVED | Noted: 2017-05-01 | Resolved: 2017-05-01

## 2017-05-01 PROBLEM — K52.9 CHRONIC DIARRHEA: Status: ACTIVE | Noted: 2017-05-01

## 2017-05-01 PROBLEM — R63.4 WEIGHT LOSS: Status: ACTIVE | Noted: 2017-05-01

## 2017-05-01 PROBLEM — M54.9 CHRONIC BACK PAIN: Status: ACTIVE | Noted: 2017-05-01

## 2017-05-01 PROBLEM — G89.29 CHRONIC BACK PAIN: Status: ACTIVE | Noted: 2017-05-01

## 2017-05-01 PROBLEM — E87.1 HYPONATREMIA: Status: RESOLVED | Noted: 2017-05-01 | Resolved: 2017-05-01

## 2017-05-01 PROBLEM — E87.6 HYPOKALEMIA: Status: RESOLVED | Noted: 2017-05-01 | Resolved: 2017-05-01

## 2017-05-01 PROBLEM — E87.6 HYPOKALEMIA: Status: ACTIVE | Noted: 2017-05-01

## 2017-05-01 LAB
ALBUMIN SERPL-MCNC: 3.5 G/DL (ref 3.5–5)
ANION GAP SERPL CALCULATED.3IONS-SCNC: 11 MMOL/L
BUN BLD-MCNC: 5 MG/DL (ref 7–20)
BUN/CREAT SERPL: 12.5 (ref 7.1–23.5)
CALCIUM SPEC-SCNC: 8 MG/DL (ref 8.4–10.2)
CHLORIDE SERPL-SCNC: 95 MMOL/L (ref 98–107)
CO2 SERPL-SCNC: 40 MMOL/L (ref 26–30)
CREAT BLD-MCNC: 0.4 MG/DL (ref 0.6–1.3)
DEPRECATED RDW RBC AUTO: 49.4 FL (ref 37–54)
ERYTHROCYTE [DISTWIDTH] IN BLOOD BY AUTOMATED COUNT: 13.2 % (ref 11.5–14.5)
GFR SERPL CREATININE-BSD FRML MDRD: >150 ML/MIN/1.73
GLUCOSE BLD-MCNC: 90 MG/DL (ref 74–98)
HCT VFR BLD AUTO: 31 % (ref 37–47)
HGB BLD-MCNC: 10.5 G/DL (ref 12–16)
MCH RBC QN AUTO: 34.9 PG (ref 27–31)
MCHC RBC AUTO-ENTMCNC: 33.9 G/DL (ref 30–37)
MCV RBC AUTO: 103 FL (ref 81–99)
PHOSPHATE SERPL-MCNC: 1.9 MG/DL (ref 2.5–4.5)
PLATELET # BLD AUTO: 143 10*3/MM3 (ref 130–400)
PMV BLD AUTO: 10.6 FL (ref 6–12)
POTASSIUM BLD-SCNC: 4 MMOL/L (ref 3.5–5.1)
RBC # BLD AUTO: 3.01 10*6/MM3 (ref 4.2–5.4)
SODIUM BLD-SCNC: 142 MMOL/L (ref 137–145)
WBC NRBC COR # BLD: 10.6 10*3/MM3 (ref 4.8–10.8)

## 2017-05-01 PROCEDURE — 85027 COMPLETE CBC AUTOMATED: CPT | Performed by: INTERNAL MEDICINE

## 2017-05-01 PROCEDURE — 0 TECHNETIUM TC 99M MEBROFENIN KIT: Performed by: INTERNAL MEDICINE

## 2017-05-01 PROCEDURE — A9537 TC99M MEBROFENIN: HCPCS | Performed by: INTERNAL MEDICINE

## 2017-05-01 PROCEDURE — 99238 HOSP IP/OBS DSCHRG MGMT 30/<: CPT | Performed by: NURSE PRACTITIONER

## 2017-05-01 PROCEDURE — 94799 UNLISTED PULMONARY SVC/PX: CPT

## 2017-05-01 PROCEDURE — 78227 HEPATOBIL SYST IMAGE W/DRUG: CPT

## 2017-05-01 PROCEDURE — 25010000002 SINCALIDE PER 5 MCG: Performed by: INTERNAL MEDICINE

## 2017-05-01 PROCEDURE — 80069 RENAL FUNCTION PANEL: CPT | Performed by: INTERNAL MEDICINE

## 2017-05-01 RX ORDER — KIT FOR THE PREPARATION OF TECHNETIUM TC 99M MEBROFENIN 45 MG/10ML
1 INJECTION, POWDER, LYOPHILIZED, FOR SOLUTION INTRAVENOUS
Status: COMPLETED | OUTPATIENT
Start: 2017-05-01 | End: 2017-05-01

## 2017-05-01 RX ORDER — ALBUTEROL SULFATE 90 UG/1
2 AEROSOL, METERED RESPIRATORY (INHALATION) EVERY 4 HOURS PRN
Qty: 1 INHALER | Refills: 0 | Status: SHIPPED | OUTPATIENT
Start: 2017-05-01 | End: 2019-02-13 | Stop reason: HOSPADM

## 2017-05-01 RX ADMIN — OXYCODONE HYDROCHLORIDE AND ACETAMINOPHEN 1 TABLET: 10; 325 TABLET ORAL at 06:57

## 2017-05-01 RX ADMIN — CETIRIZINE HYDROCHLORIDE 10 MG: 10 TABLET, FILM COATED ORAL at 10:39

## 2017-05-01 RX ADMIN — SINCALIDE 0.8 MCG: 5 INJECTION, POWDER, LYOPHILIZED, FOR SOLUTION INTRAVENOUS at 06:00

## 2017-05-01 RX ADMIN — GABAPENTIN 800 MG: 400 CAPSULE ORAL at 10:44

## 2017-05-01 RX ADMIN — MEBROFENIN 1 DOSE: 45 INJECTION, POWDER, LYOPHILIZED, FOR SOLUTION INTRAVENOUS at 05:25

## 2017-05-01 RX ADMIN — IPRATROPIUM BROMIDE AND ALBUTEROL SULFATE 3 ML: .5; 3 SOLUTION RESPIRATORY (INHALATION) at 13:05

## 2017-05-01 RX ADMIN — METHOCARBAMOL 500 MG: 500 TABLET ORAL at 10:40

## 2017-05-01 RX ADMIN — DEXTROSE AND SODIUM CHLORIDE 100 ML/HR: 5; 450 INJECTION, SOLUTION INTRAVENOUS at 06:59

## 2017-05-01 RX ADMIN — METHOCARBAMOL 500 MG: 500 TABLET ORAL at 16:43

## 2017-05-01 RX ADMIN — TRAMADOL HYDROCHLORIDE 50 MG: 50 TABLET, COATED ORAL at 10:40

## 2017-05-01 RX ADMIN — OXYCODONE HYDROCHLORIDE AND ACETAMINOPHEN 1 TABLET: 10; 325 TABLET ORAL at 16:43

## 2017-05-01 RX ADMIN — ESCITALOPRAM OXALATE 10 MG: 10 TABLET ORAL at 10:39

## 2017-05-01 RX ADMIN — TRAMADOL HYDROCHLORIDE 50 MG: 50 TABLET, COATED ORAL at 16:43

## 2017-05-01 RX ADMIN — OXYCODONE HYDROCHLORIDE AND ACETAMINOPHEN 1 TABLET: 10; 325 TABLET ORAL at 10:44

## 2017-05-01 RX ADMIN — AMLODIPINE BESYLATE 10 MG: 5 TABLET ORAL at 10:39

## 2017-05-01 RX ADMIN — ISOSORBIDE MONONITRATE 30 MG: 30 TABLET, EXTENDED RELEASE ORAL at 10:39

## 2017-05-01 RX ADMIN — IPRATROPIUM BROMIDE AND ALBUTEROL SULFATE 3 ML: .5; 3 SOLUTION RESPIRATORY (INHALATION) at 07:02

## 2017-05-02 LAB
HBV CORE AB SER DONR QL IA: NEGATIVE
HBV CORE IGM SERPL QL IA: NEGATIVE
HBV E AB SERPL QL IA: NEGATIVE
HBV E AG SERPL QL IA: NEGATIVE
HBV SURFACE AB SER QL: NON REACTIVE
HBV SURFACE AG SERPL QL IA: NEGATIVE
HCV AB S/CO SERPL IA: <0.1 S/CO RATIO (ref 0–0.9)
LABORATORY COMMENT REPORT: NORMAL

## 2017-05-04 LAB
LAB AP CASE REPORT: NORMAL
Lab: NORMAL
PATH REPORT.FINAL DX SPEC: NORMAL

## 2017-05-12 ENCOUNTER — OFFICE VISIT (OUTPATIENT)
Dept: SURGERY | Facility: CLINIC | Age: 57
End: 2017-05-12

## 2017-05-12 VITALS
DIASTOLIC BLOOD PRESSURE: 70 MMHG | HEIGHT: 59 IN | BODY MASS INDEX: 19.64 KG/M2 | WEIGHT: 97.4 LBS | SYSTOLIC BLOOD PRESSURE: 120 MMHG | TEMPERATURE: 97.9 F | OXYGEN SATURATION: 93 % | HEART RATE: 103 BPM | RESPIRATION RATE: 16 BRPM

## 2017-05-12 DIAGNOSIS — K29.30 CHRONIC SUPERFICIAL GASTRITIS WITHOUT BLEEDING: ICD-10-CM

## 2017-05-12 DIAGNOSIS — K86.1 CHRONIC BILIARY PANCREATITIS (HCC): Primary | ICD-10-CM

## 2017-05-12 PROCEDURE — 99213 OFFICE O/P EST LOW 20 MIN: CPT | Performed by: SURGERY

## 2017-07-06 ENCOUNTER — TRANSCRIBE ORDERS (OUTPATIENT)
Dept: LAB | Facility: HOSPITAL | Age: 57
End: 2017-07-06

## 2017-07-06 ENCOUNTER — LAB (OUTPATIENT)
Dept: LAB | Facility: HOSPITAL | Age: 57
End: 2017-07-06

## 2017-07-06 DIAGNOSIS — E78.5 HYPERLIPIDEMIA, UNSPECIFIED HYPERLIPIDEMIA TYPE: Primary | ICD-10-CM

## 2017-07-06 DIAGNOSIS — E78.5 HYPERLIPIDEMIA, UNSPECIFIED HYPERLIPIDEMIA TYPE: ICD-10-CM

## 2017-07-06 LAB
ALBUMIN SERPL-MCNC: 4.8 G/DL (ref 3.5–5)
ALP SERPL-CCNC: 79 U/L (ref 38–126)
ALT SERPL W P-5'-P-CCNC: 32 U/L (ref 13–69)
AST SERPL-CCNC: 49 U/L (ref 15–46)
BILIRUB CONJ SERPL-MCNC: 0.6 MG/DL (ref 0–0.4)
BILIRUB INDIRECT SERPL-MCNC: 0.3 MG/DL
BILIRUB SERPL-MCNC: 0.9 MG/DL (ref 0.2–1.3)
CHOLEST SERPL-MCNC: 226 MG/DL (ref 0–199)
HDLC SERPL-MCNC: 151 MG/DL (ref 40–60)
LDLC SERPL CALC-MCNC: 63 MG/DL (ref 0–99)
LDLC/HDLC SERPL: 0.42 {RATIO}
PROT SERPL-MCNC: 8 G/DL (ref 6.3–8.2)
TRIGL SERPL-MCNC: 58 MG/DL
VLDLC SERPL-MCNC: 11.6 MG/DL

## 2017-07-06 PROCEDURE — 36415 COLL VENOUS BLD VENIPUNCTURE: CPT

## 2017-07-06 PROCEDURE — 80076 HEPATIC FUNCTION PANEL: CPT | Performed by: INTERNAL MEDICINE

## 2017-07-06 PROCEDURE — 80061 LIPID PANEL: CPT | Performed by: INTERNAL MEDICINE

## 2017-08-29 ENCOUNTER — TRANSCRIBE ORDERS (OUTPATIENT)
Dept: ADMINISTRATIVE | Facility: HOSPITAL | Age: 57
End: 2017-08-29

## 2017-08-29 ENCOUNTER — HOSPITAL ENCOUNTER (OUTPATIENT)
Dept: CT IMAGING | Facility: HOSPITAL | Age: 57
Discharge: HOME | End: 2017-08-29
Admitting: NURSE PRACTITIONER

## 2017-08-29 ENCOUNTER — HOSPITAL ENCOUNTER (EMERGENCY)
Facility: HOSPITAL | Age: 57
Discharge: LEFT BEFORE DISCHARGE | End: 2017-08-29
Attending: EMERGENCY MEDICINE | Admitting: EMERGENCY MEDICINE

## 2017-08-29 VITALS
RESPIRATION RATE: 16 BRPM | DIASTOLIC BLOOD PRESSURE: 84 MMHG | BODY MASS INDEX: 19.76 KG/M2 | HEART RATE: 86 BPM | TEMPERATURE: 98.5 F | WEIGHT: 98 LBS | HEIGHT: 59 IN | OXYGEN SATURATION: 98 % | SYSTOLIC BLOOD PRESSURE: 129 MMHG

## 2017-08-29 DIAGNOSIS — I60.9 SUBARACHNOID HEMORRHAGE (HCC): Primary | ICD-10-CM

## 2017-08-29 DIAGNOSIS — I60.9 SUBARACHNOID HEMORRHAGE (HCC): ICD-10-CM

## 2017-08-29 PROCEDURE — 99283 EMERGENCY DEPT VISIT LOW MDM: CPT

## 2017-08-29 PROCEDURE — 70450 CT HEAD/BRAIN W/O DYE: CPT

## 2017-09-01 ENCOUNTER — HOSPITAL ENCOUNTER (EMERGENCY)
Facility: HOSPITAL | Age: 57
Discharge: ANOTHER HOSPITAL | End: 2017-09-01
Attending: EMERGENCY MEDICINE | Admitting: EMERGENCY MEDICINE

## 2017-09-01 ENCOUNTER — APPOINTMENT (OUTPATIENT)
Dept: GENERAL RADIOLOGY | Facility: HOSPITAL | Age: 57
End: 2017-09-01

## 2017-09-01 ENCOUNTER — APPOINTMENT (OUTPATIENT)
Dept: CT IMAGING | Facility: HOSPITAL | Age: 57
End: 2017-09-01

## 2017-09-01 VITALS
HEIGHT: 59 IN | OXYGEN SATURATION: 92 % | RESPIRATION RATE: 30 BRPM | BODY MASS INDEX: 19.76 KG/M2 | WEIGHT: 98 LBS | HEART RATE: 93 BPM | TEMPERATURE: 98.6 F | DIASTOLIC BLOOD PRESSURE: 93 MMHG | SYSTOLIC BLOOD PRESSURE: 135 MMHG

## 2017-09-01 DIAGNOSIS — I61.9 INTRAPARENCHYMAL HEMORRHAGE OF BRAIN (HCC): ICD-10-CM

## 2017-09-01 DIAGNOSIS — R06.02 SOB (SHORTNESS OF BREATH): Primary | ICD-10-CM

## 2017-09-01 DIAGNOSIS — I50.21 ACUTE SYSTOLIC HEART FAILURE (HCC): ICD-10-CM

## 2017-09-01 LAB
ALBUMIN SERPL-MCNC: 3.8 G/DL (ref 3.5–5)
ALBUMIN/GLOB SERPL: 1.2 G/DL (ref 1–2)
ALP SERPL-CCNC: 75 U/L (ref 38–126)
ALT SERPL W P-5'-P-CCNC: 33 U/L (ref 13–69)
ANION GAP SERPL CALCULATED.3IONS-SCNC: 12.7 MMOL/L
AST SERPL-CCNC: 27 U/L (ref 15–46)
BASOPHILS # BLD AUTO: 0.04 10*3/MM3 (ref 0–0.2)
BASOPHILS NFR BLD AUTO: 0.6 % (ref 0–2.5)
BILIRUB SERPL-MCNC: 0.5 MG/DL (ref 0.2–1.3)
BUN BLD-MCNC: 6 MG/DL (ref 7–20)
BUN/CREAT SERPL: 15 (ref 7.1–23.5)
CALCIUM SPEC-SCNC: 8.9 MG/DL (ref 8.4–10.2)
CHLORIDE SERPL-SCNC: 99 MMOL/L (ref 98–107)
CO2 SERPL-SCNC: 25 MMOL/L (ref 26–30)
CREAT BLD-MCNC: 0.4 MG/DL (ref 0.6–1.3)
DEPRECATED RDW RBC AUTO: 47.2 FL (ref 37–54)
EOSINOPHIL # BLD AUTO: 0.03 10*3/MM3 (ref 0–0.7)
EOSINOPHIL NFR BLD AUTO: 0.5 % (ref 0–7)
ERYTHROCYTE [DISTWIDTH] IN BLOOD BY AUTOMATED COUNT: 13.2 % (ref 11.5–14.5)
GFR SERPL CREATININE-BSD FRML MDRD: >150 ML/MIN/1.73
GLOBULIN UR ELPH-MCNC: 3.1 GM/DL
GLUCOSE BLD-MCNC: 82 MG/DL (ref 74–98)
HCT VFR BLD AUTO: 28.8 % (ref 37–47)
HGB BLD-MCNC: 9.8 G/DL (ref 12–16)
HOLD SPECIMEN: NORMAL
HOLD SPECIMEN: NORMAL
IMM GRANULOCYTES # BLD: 0.03 10*3/MM3 (ref 0–0.06)
IMM GRANULOCYTES NFR BLD: 0.5 % (ref 0–0.6)
LYMPHOCYTES # BLD AUTO: 1.23 10*3/MM3 (ref 0.6–3.4)
LYMPHOCYTES NFR BLD AUTO: 18.6 % (ref 10–50)
MCH RBC QN AUTO: 33.3 PG (ref 27–31)
MCHC RBC AUTO-ENTMCNC: 34 G/DL (ref 30–37)
MCV RBC AUTO: 98 FL (ref 81–99)
MONOCYTES # BLD AUTO: 0.77 10*3/MM3 (ref 0–0.9)
MONOCYTES NFR BLD AUTO: 11.6 % (ref 0–12)
NEUTROPHILS # BLD AUTO: 4.52 10*3/MM3 (ref 2–6.9)
NEUTROPHILS NFR BLD AUTO: 68.2 % (ref 37–80)
NRBC BLD MANUAL-RTO: 0 /100 WBC (ref 0–0)
NT-PROBNP SERPL-MCNC: 4360 PG/ML (ref 0–125)
PLATELET # BLD AUTO: 240 10*3/MM3 (ref 130–400)
PMV BLD AUTO: 9.6 FL (ref 6–12)
POTASSIUM BLD-SCNC: 4.7 MMOL/L (ref 3.5–5.1)
PROT SERPL-MCNC: 6.9 G/DL (ref 6.3–8.2)
RBC # BLD AUTO: 2.94 10*6/MM3 (ref 4.2–5.4)
SODIUM BLD-SCNC: 132 MMOL/L (ref 137–145)
TROPONIN I SERPL-MCNC: 0.02 NG/ML (ref 0–0.03)
WBC NRBC COR # BLD: 6.62 10*3/MM3 (ref 4.8–10.8)
WHOLE BLOOD HOLD SPECIMEN: NORMAL
WHOLE BLOOD HOLD SPECIMEN: NORMAL

## 2017-09-01 PROCEDURE — 99284 EMERGENCY DEPT VISIT MOD MDM: CPT

## 2017-09-01 PROCEDURE — 85025 COMPLETE CBC W/AUTO DIFF WBC: CPT | Performed by: EMERGENCY MEDICINE

## 2017-09-01 PROCEDURE — 84484 ASSAY OF TROPONIN QUANT: CPT | Performed by: EMERGENCY MEDICINE

## 2017-09-01 PROCEDURE — 80053 COMPREHEN METABOLIC PANEL: CPT | Performed by: EMERGENCY MEDICINE

## 2017-09-01 PROCEDURE — 25010000002 FUROSEMIDE PER 20 MG: Performed by: EMERGENCY MEDICINE

## 2017-09-01 PROCEDURE — 70450 CT HEAD/BRAIN W/O DYE: CPT

## 2017-09-01 PROCEDURE — 96374 THER/PROPH/DIAG INJ IV PUSH: CPT

## 2017-09-01 PROCEDURE — 93005 ELECTROCARDIOGRAM TRACING: CPT | Performed by: EMERGENCY MEDICINE

## 2017-09-01 PROCEDURE — 71020 HC CHEST PA AND LATERAL: CPT

## 2017-09-01 PROCEDURE — 83880 ASSAY OF NATRIURETIC PEPTIDE: CPT | Performed by: EMERGENCY MEDICINE

## 2017-09-01 RX ORDER — FUROSEMIDE 10 MG/ML
40 INJECTION INTRAMUSCULAR; INTRAVENOUS ONCE
Status: COMPLETED | OUTPATIENT
Start: 2017-09-01 | End: 2017-09-01

## 2017-09-01 RX ORDER — NICOTINE 21 MG/24HR
1 PATCH, TRANSDERMAL 24 HOURS TRANSDERMAL EVERY 24 HOURS
Status: DISCONTINUED | OUTPATIENT
Start: 2017-09-01 | End: 2017-09-01 | Stop reason: HOSPADM

## 2017-09-01 RX ORDER — SODIUM CHLORIDE 0.9 % (FLUSH) 0.9 %
10 SYRINGE (ML) INJECTION AS NEEDED
Status: DISCONTINUED | OUTPATIENT
Start: 2017-09-01 | End: 2017-09-01 | Stop reason: HOSPADM

## 2017-09-01 RX ADMIN — NICOTINE 1 PATCH: 21 PATCH TRANSDERMAL at 13:50

## 2017-09-01 RX ADMIN — FUROSEMIDE 40 MG: 10 INJECTION, SOLUTION INTRAMUSCULAR; INTRAVENOUS at 13:42

## 2017-09-01 NOTE — ED PROVIDER NOTES
Subjective   History of Present Illness   57-year-old female with a history of COPD, hypertension, chronic pain sent from pain clinic where she was noted to have an oxygen saturation of 80%.  States that she still short of breath since 4:00 this morning with a cough for the last day.  Denies any chest pain, fever.  States that she does feel off balance but has been off balance and she fell and hit her head and had what sounds like a subarachnoid hemorrhage sometime last week.  She was evaluated for this at  but left AGAINST MEDICAL ADVICE on Sunday.  She was then seen here several days later on the 29th for continued imbalance.  Request for made for records from , and patient left from the emergency department AGAINST MEDICAL ADVICE because she was angry that she had to wait for these records.  In terms of this imbalance, patient unable to state whether it feels worse or the same since it started.  In terms of her shortness of breath, denies any history of prior DVT or PE, recent surgery, hemoptysis, leg swelling or hormone use.  Finally, patient states that she does have a history of COPD and was using albuterol inhaler 3 times daily but ran out one month ago and never got it refilled.      Review of Systems   Respiratory: Positive for cough and shortness of breath.    All other systems reviewed and are negative.      Past Medical History:   Diagnosis Date   • Arthritis    • COPD (chronic obstructive pulmonary disease)    • Depression    • Heart disease    • Hypertension    • Lumbar herniated disc        Allergies   Allergen Reactions   • Flexeril [Cyclobenzaprine]    • Multi-Vitamins  [Anti-Oxidant] Diarrhea   • Zanaflex [Tizanidine]        Past Surgical History:   Procedure Laterality Date   • APPENDECTOMY     • BACK SURGERY      fusion   • COLONOSCOPY     • ENDOSCOPY N/A 4/30/2017    Procedure: ESOPHAGOGASTRODUODENOSCOPY WITH BX;  Surgeon: Sherry Lake MD;  Location: [facility] ENDOSCOPY;  Service:    •  HYSTERECTOMY         Family History   Problem Relation Age of Onset   • Family history unknown: Yes       Social History     Social History   • Marital status:      Spouse name: N/A   • Number of children: N/A   • Years of education: N/A     Social History Main Topics   • Smoking status: Current Every Day Smoker   • Smokeless tobacco: Not on file   • Alcohol use No   • Drug use: No   • Sexual activity: Not on file     Other Topics Concern   • Not on file     Social History Narrative           Objective   Physical Exam   Constitutional: She is oriented to person, place, and time. She appears well-developed and well-nourished. No distress.   HENT:   Head: Normocephalic.   Mouth/Throat: Oropharynx is clear and moist. No oropharyngeal exudate.   Eyes: Conjunctivae are normal. No scleral icterus.   Neck: Neck supple. No tracheal deviation present.   Cardiovascular: Normal rate, regular rhythm, normal heart sounds and intact distal pulses.  Exam reveals no gallop and no friction rub.    No murmur heard.  Pulmonary/Chest: Effort normal. No stridor. No respiratory distress. She has no wheezes. She has rales (Wet crackles heard right base to mid chest.).   Abdominal: Soft. She exhibits no distension and no mass. There is no tenderness. There is no rebound and no guarding.   Musculoskeletal: She exhibits no edema or deformity.   Neurological: She is alert and oriented to person, place, and time.   Skin: Skin is warm and dry. She is not diaphoretic. No erythema. No pallor.   Psychiatric: She has a normal mood and affect. Her behavior is normal.   Nursing note and vitals reviewed.      Procedures         ED Course  ED Course                  MDM  57-year-old female here with cough, shortness of breath and the setting of recent head trauma with subarachnoid hemorrhage. Hypoxic to room air (no prior oxygen requirements).  High concern for pneumonia versus unlikely heart failure, COPD flare, PE, other.  Plan for labs,  chest x-ray, will get a CT of the brain as well as to assess for stability of this hemorrhage, and reassess.    EKG: NSR w/ nl intervals, no brandon/std.       1:20 PM Chest x-ray shows pulmonary edema.  Labs were all for a BNP of over 4000.  Patient has no history of heart failure in the past.  Given her blood pressure 100/70, will defer nitrates and give a dose of Lasix now will discuss admission with hospitalist.    2:02 PM discussed with our hospitalist who is uncomfortable admitting given the patient has intraparenchymal bleed is still with mass effect.  Call to  who noted the patient already from prior admission and who will accept the patient to their ED for further management.  Dr. Jaimes is the accepting physician.    Final diagnoses:   SOB (shortness of breath)   Intraparenchymal hemorrhage of brain   Acute systolic heart failure            Chinedu Marley MD  09/01/17 1187

## 2018-01-29 ENCOUNTER — TRANSCRIBE ORDERS (OUTPATIENT)
Dept: ADMINISTRATIVE | Facility: HOSPITAL | Age: 58
End: 2018-01-29

## 2018-01-29 DIAGNOSIS — Z72.0 TOBACCO ABUSE: Primary | ICD-10-CM

## 2018-03-14 ENCOUNTER — TELEPHONE (OUTPATIENT)
Dept: SURGERY | Facility: CLINIC | Age: 58
End: 2018-03-14

## 2018-03-15 ENCOUNTER — TRANSCRIBE ORDERS (OUTPATIENT)
Dept: ADMINISTRATIVE | Facility: HOSPITAL | Age: 58
End: 2018-03-15

## 2018-03-15 DIAGNOSIS — M25.571 CHRONIC PAIN OF RIGHT ANKLE: Primary | ICD-10-CM

## 2018-03-15 DIAGNOSIS — Z12.39 SCREENING BREAST EXAMINATION: ICD-10-CM

## 2018-03-15 DIAGNOSIS — G89.29 CHRONIC PAIN OF RIGHT ANKLE: Primary | ICD-10-CM

## 2018-03-23 ENCOUNTER — PREP FOR SURGERY (OUTPATIENT)
Dept: OTHER | Facility: HOSPITAL | Age: 58
End: 2018-03-23

## 2018-03-23 DIAGNOSIS — Z12.11 COLON CANCER SCREENING: Primary | ICD-10-CM

## 2018-03-26 PROBLEM — Z12.11 COLON CANCER SCREENING: Status: ACTIVE | Noted: 2018-03-26

## 2018-03-29 ENCOUNTER — TRANSCRIBE ORDERS (OUTPATIENT)
Dept: ADMINISTRATIVE | Facility: HOSPITAL | Age: 58
End: 2018-03-29

## 2018-03-29 ENCOUNTER — HOSPITAL ENCOUNTER (OUTPATIENT)
Dept: GENERAL RADIOLOGY | Facility: HOSPITAL | Age: 58
Discharge: HOME | End: 2018-03-29
Admitting: NURSE PRACTITIONER

## 2018-03-29 DIAGNOSIS — M54.2 NECK PAIN: ICD-10-CM

## 2018-03-29 DIAGNOSIS — M54.2 CERVICAL PAIN: ICD-10-CM

## 2018-03-29 DIAGNOSIS — M54.5 LOW BACK PAIN, UNSPECIFIED BACK PAIN LATERALITY, UNSPECIFIED CHRONICITY, WITH SCIATICA PRESENCE UNSPECIFIED: Primary | ICD-10-CM

## 2018-03-29 PROCEDURE — 72050 X-RAY EXAM NECK SPINE 4/5VWS: CPT

## 2018-03-29 PROCEDURE — 72110 X-RAY EXAM L-2 SPINE 4/>VWS: CPT

## 2018-07-25 ENCOUNTER — TRANSCRIBE ORDERS (OUTPATIENT)
Dept: ADMINISTRATIVE | Facility: HOSPITAL | Age: 58
End: 2018-07-25

## 2018-07-25 DIAGNOSIS — M54.42 CHRONIC BILATERAL LOW BACK PAIN WITH BILATERAL SCIATICA: ICD-10-CM

## 2018-07-25 DIAGNOSIS — M54.41 CHRONIC BILATERAL LOW BACK PAIN WITH BILATERAL SCIATICA: ICD-10-CM

## 2018-07-25 DIAGNOSIS — G89.29 CHRONIC BILATERAL LOW BACK PAIN WITH BILATERAL SCIATICA: ICD-10-CM

## 2018-07-25 DIAGNOSIS — M54.2 NECK PAIN: Primary | ICD-10-CM

## 2018-07-26 ENCOUNTER — HOSPITAL ENCOUNTER (OUTPATIENT)
Dept: CT IMAGING | Facility: HOSPITAL | Age: 58
Discharge: HOME | End: 2018-07-26

## 2018-07-26 ENCOUNTER — HOSPITAL ENCOUNTER (OUTPATIENT)
Dept: MRI IMAGING | Facility: HOSPITAL | Age: 58
Discharge: HOME | End: 2018-07-26

## 2018-07-26 ENCOUNTER — HOSPITAL ENCOUNTER (OUTPATIENT)
Dept: MRI IMAGING | Facility: HOSPITAL | Age: 58
End: 2018-07-26

## 2018-07-26 ENCOUNTER — HOSPITAL ENCOUNTER (OUTPATIENT)
Dept: MAMMOGRAPHY | Facility: HOSPITAL | Age: 58
Discharge: HOME | End: 2018-07-26
Admitting: NURSE PRACTITIONER

## 2018-07-26 ENCOUNTER — APPOINTMENT (OUTPATIENT)
Dept: MRI IMAGING | Facility: HOSPITAL | Age: 58
End: 2018-07-26

## 2018-07-26 ENCOUNTER — HOSPITAL ENCOUNTER (OUTPATIENT)
Dept: ULTRASOUND IMAGING | Facility: HOSPITAL | Age: 58
Discharge: HOME | End: 2018-07-26

## 2018-07-26 DIAGNOSIS — M54.41 CHRONIC BILATERAL LOW BACK PAIN WITH BILATERAL SCIATICA: ICD-10-CM

## 2018-07-26 DIAGNOSIS — M54.42 CHRONIC BILATERAL LOW BACK PAIN WITH BILATERAL SCIATICA: ICD-10-CM

## 2018-07-26 DIAGNOSIS — Z12.39 SCREENING BREAST EXAMINATION: ICD-10-CM

## 2018-07-26 DIAGNOSIS — M25.571 CHRONIC PAIN OF RIGHT ANKLE: ICD-10-CM

## 2018-07-26 DIAGNOSIS — M54.2 NECK PAIN: ICD-10-CM

## 2018-07-26 DIAGNOSIS — G89.29 CHRONIC PAIN OF RIGHT ANKLE: ICD-10-CM

## 2018-07-26 DIAGNOSIS — G89.29 CHRONIC BILATERAL LOW BACK PAIN WITH BILATERAL SCIATICA: ICD-10-CM

## 2018-07-26 DIAGNOSIS — Z72.0 TOBACCO ABUSE: ICD-10-CM

## 2018-07-26 PROCEDURE — 72148 MRI LUMBAR SPINE W/O DYE: CPT

## 2018-07-26 PROCEDURE — G0297 LDCT FOR LUNG CA SCREEN: HCPCS

## 2018-07-26 PROCEDURE — 77067 SCR MAMMO BI INCL CAD: CPT

## 2018-07-26 PROCEDURE — 77063 BREAST TOMOSYNTHESIS BI: CPT

## 2018-07-26 PROCEDURE — 72141 MRI NECK SPINE W/O DYE: CPT

## 2018-08-16 ENCOUNTER — TELEPHONE (OUTPATIENT)
Dept: SURGERY | Facility: CLINIC | Age: 58
End: 2018-08-16

## 2018-08-23 ENCOUNTER — TELEPHONE (OUTPATIENT)
Dept: SURGERY | Facility: CLINIC | Age: 58
End: 2018-08-23

## 2018-08-23 NOTE — TELEPHONE ENCOUNTER
Called the patient to remind them of an upcoming appointment with general surgery. I was able to reach the patient.

## 2018-08-24 ENCOUNTER — OFFICE VISIT (OUTPATIENT)
Dept: SURGERY | Facility: CLINIC | Age: 58
End: 2018-08-24

## 2018-08-24 VITALS
WEIGHT: 94.6 LBS | BODY MASS INDEX: 19.07 KG/M2 | DIASTOLIC BLOOD PRESSURE: 64 MMHG | TEMPERATURE: 98.8 F | HEIGHT: 59 IN | OXYGEN SATURATION: 99 % | HEART RATE: 98 BPM | SYSTOLIC BLOOD PRESSURE: 118 MMHG

## 2018-08-24 DIAGNOSIS — K64.8 INTERNAL HEMORRHOIDS: Primary | ICD-10-CM

## 2018-08-24 DIAGNOSIS — K62.5 RECTAL BLEED: ICD-10-CM

## 2018-08-24 PROCEDURE — 99214 OFFICE O/P EST MOD 30 MIN: CPT | Performed by: SURGERY

## 2018-08-24 RX ORDER — BISACODYL 5 MG/1
TABLET, DELAYED RELEASE ORAL
Qty: 4 TABLET | Refills: 0 | Status: ON HOLD | OUTPATIENT
Start: 2018-08-24 | End: 2018-11-12

## 2018-08-24 RX ORDER — POLYETHYLENE GLYCOL 3350 17 G/17G
255 POWDER, FOR SOLUTION ORAL ONCE
Qty: 15 PACKET | Refills: 0 | Status: SHIPPED | OUTPATIENT
Start: 2018-08-24 | End: 2018-08-24

## 2018-08-24 RX ORDER — CARVEDILOL 3.12 MG/1
3.12 TABLET ORAL 2 TIMES DAILY WITH MEALS
Status: ON HOLD | COMMUNITY
Start: 2018-08-07 | End: 2018-11-12

## 2018-08-24 NOTE — PROGRESS NOTES
Patient: Ce Reynolds    YOB: 1960    Date: 08/24/2018    Primary Care Provider: Lyssa Luciano APRN    Chief Complaint   Patient presents with   • Hemorrhoids       Subjective .     History of present illness: Patient is being seen in the office today for a evaluation and treatment of hemorrhoids for the last couple months. She complains of pain and bleeding per rectum. Her last colonoscopy was about 6 years ago.  No weight loss or anemia.  No significant abdominal pain.  No family history of colon cancer.    The following portions of the patient's history were reviewed and updated as appropriate: allergies, current medications, past family history, past medical history, past social history, past surgical history and problem list.      Review of Systems   Constitutional: Negative for chills, fever and unexpected weight change.   HENT: Negative for hearing loss, trouble swallowing and voice change.    Eyes: Negative for visual disturbance.   Respiratory: Negative for apnea, cough, chest tightness, shortness of breath and wheezing.    Cardiovascular: Negative for chest pain, palpitations and leg swelling.   Gastrointestinal: Positive for anal bleeding and rectal pain. Negative for abdominal distention, abdominal pain, blood in stool, constipation, diarrhea, nausea and vomiting.   Endocrine: Negative for cold intolerance and heat intolerance.   Genitourinary: Negative for difficulty urinating, dysuria and flank pain.   Musculoskeletal: Negative for back pain and gait problem.   Skin: Negative for color change, rash and wound.   Neurological: Negative for dizziness, syncope, speech difficulty, weakness, light-headedness, numbness and headaches.   Hematological: Negative for adenopathy. Does not bruise/bleed easily.   Psychiatric/Behavioral: Negative for confusion. The patient is not nervous/anxious.        Allergies:  Allergies   Allergen Reactions   • Flexeril [Cyclobenzaprine] Cough   •  "Multi-Vitamins  [Anti-Oxidant] Diarrhea   • Zanaflex [Tizanidine] Cough       Medications:    Current Outpatient Prescriptions:   •  albuterol (PROVENTIL HFA;VENTOLIN HFA) 108 (90 BASE) MCG/ACT inhaler, Inhale 2 puffs Every 4 (Four) Hours As Needed for Wheezing., Disp: 1 inhaler, Rfl: 0  •  amLODIPine (NORVASC) 10 MG tablet, Take 10 mg by mouth daily., Disp: , Rfl: 2  •  carvedilol (COREG) 3.125 MG tablet, , Disp: , Rfl:   •  escitalopram (LEXAPRO) 10 MG tablet, Take 10 mg by mouth Daily., Disp: , Rfl:   •  gabapentin (NEURONTIN) 800 MG tablet, Take 800 mg by mouth Every 6 (Six) Hours., Disp: , Rfl: 1  •  isosorbide mononitrate (IMDUR) 30 MG 24 hr tablet, Take 30 mg by mouth daily., Disp: , Rfl: 3  •  Loratadine (CLARITIN) 10 MG capsule, Take 10 mg by mouth Every Night., Disp: , Rfl:   •  meloxicam (MOBIC) 15 MG tablet, Take 15 mg by mouth daily., Disp: , Rfl: 1  •  methocarbamol (ROBAXIN) 500 MG tablet, Take 500 mg by mouth As Needed. TAKE EVEERY 8 HRS. PRN, Disp: , Rfl: 1  •  omeprazole (priLOSEC) 20 MG capsule, Take 20 mg by mouth Daily., Disp: , Rfl:   •  oxyCODONE-acetaminophen (PERCOCET)  MG per tablet, Take  tablets by mouth Every 6 (Six) Hours As Needed., Disp: , Rfl: 0  •  Phenylephrine-Witch Hazel 0.25-50 % gel, Use 3-4 times daily, Disp: 25 g, Rfl: 0  •  traMADol (ULTRAM) 50 MG tablet, Take 50 mg by mouth 2 (two) times a day., Disp: , Rfl: 1    History\"  Past Medical History:   Diagnosis Date   • Arthritis    • COPD (chronic obstructive pulmonary disease) (CMS/HCC)    • Depression    • Heart disease    • Hypertension    • Lumbar herniated disc        Past Surgical History:   Procedure Laterality Date   • APPENDECTOMY     • BACK SURGERY      fusion   • COLONOSCOPY     • ENDOSCOPY N/A 4/30/2017    Procedure: ESOPHAGOGASTRODUODENOSCOPY WITH BX;  Surgeon: Sherry Lake MD;  Location: [facility] ENDOSCOPY;  Service:    • HYSTERECTOMY     • OOPHORECTOMY         Family History   Problem Relation Age of " "Onset   • Family history unknown: Yes       Social History   Substance Use Topics   • Smoking status: Current Every Day Smoker     Packs/day: 1.00   • Smokeless tobacco: Not on file   • Alcohol use No        Objective     Vital Signs:   Vitals:    08/24/18 1433   BP: 118/64   Pulse: 98   Temp: 98.8 °F (37.1 °C)   TempSrc: Temporal Artery    SpO2: 99%   Weight: 42.9 kg (94 lb 9.6 oz)   Height: 149.9 cm (59\")       Physical Exam:   General Appearance:    Alert, cooperative, in no acute distress   Head:    Normocephalic, without obvious abnormality, atraumatic   Eyes:            Lids and lashes normal, conjunctivae and sclerae normal, no   icterus, no pallor, corneas clear, PERRLA   Ears:    Ears appear intact with no abnormalities noted   Throat:   No oral lesions, no thrush, oral mucosa moist   Neck:   No adenopathy, supple, trachea midline, no thyromegaly, no   carotid bruit, no JVD   Lungs:     Clear to auscultation,respirations regular, even and                  unlabored    Heart:    Regular rhythm and normal rate, normal S1 and S2, no            murmur, no gallop, no rub, no click   Chest Wall:    No abnormalities observed   Abdomen:     Normal bowel sounds, no masses, no organomegaly, soft        non-tender, non-distended, no guarding, no rebound                tenderness   Extremities:   Moves all extremities well, no edema, no cyanosis, no             redness   Pulses:   Pulses palpable and equal bilaterally   Skin:   No bleeding, bruising or rash   Lymph nodes:   No palpable adenopathy   Neurologic:   Cranial nerves 2 - 12 grossly intact, sensation intact, DTR       present and equal bilaterally  Rectal-no external hemorrhoids, internal hemorrhoids present.  Visible prolapse.       Results Review:   I reviewed the patient's new clinical results.    Assessment/Plan     1. Internal hemorrhoids    2. Rectal bleed        Patient scheduled for colonoscopy and banding of internal hemorrhoids.  Risk of bleeding, " infection and possible recurrence discussed and patient agreeable.    I discussed the patients findings and my recommendations with patient    Review of Systems was reviewed and confirmed as accurate today.    Electronically signed by Sherry Lake MD  08/24/18      .        Portions of this note have been scribed for Sherry Lake MD by Triny Oliveira MA 8/24/2018  2:47 PM

## 2018-08-28 PROBLEM — K62.5 RECTAL BLEED: Status: ACTIVE | Noted: 2018-08-28

## 2018-08-28 PROBLEM — K64.8 INTERNAL HEMORRHOIDS: Status: ACTIVE | Noted: 2018-08-28

## 2018-09-05 ENCOUNTER — TELEPHONE (OUTPATIENT)
Dept: SURGERY | Facility: CLINIC | Age: 58
End: 2018-09-05

## 2018-09-05 NOTE — TELEPHONE ENCOUNTER
Called the patient to remind them of an upcoming appointment with general surgery. I was able to reach to reach the patient.

## 2018-09-07 ENCOUNTER — HOSPITAL ENCOUNTER (OUTPATIENT)
Facility: HOSPITAL | Age: 58
Setting detail: HOSPITAL OUTPATIENT SURGERY
Discharge: HOME | End: 2018-09-07
Attending: SURGERY | Admitting: SURGERY

## 2018-09-07 ENCOUNTER — ANESTHESIA EVENT (OUTPATIENT)
Dept: GASTROENTEROLOGY | Facility: HOSPITAL | Age: 58
End: 2018-09-07

## 2018-09-07 ENCOUNTER — ANESTHESIA (OUTPATIENT)
Dept: GASTROENTEROLOGY | Facility: HOSPITAL | Age: 58
End: 2018-09-07

## 2018-09-07 VITALS
DIASTOLIC BLOOD PRESSURE: 92 MMHG | RESPIRATION RATE: 18 BRPM | BODY MASS INDEX: 18.75 KG/M2 | HEART RATE: 89 BPM | WEIGHT: 93 LBS | OXYGEN SATURATION: 96 % | TEMPERATURE: 97.8 F | SYSTOLIC BLOOD PRESSURE: 146 MMHG | HEIGHT: 59 IN

## 2018-09-07 PROCEDURE — 25010000002 PROPOFOL 10 MG/ML EMULSION: Performed by: NURSE ANESTHETIST, CERTIFIED REGISTERED

## 2018-09-07 RX ORDER — ONDANSETRON 2 MG/ML
4 INJECTION INTRAMUSCULAR; INTRAVENOUS ONCE AS NEEDED
Status: DISCONTINUED | OUTPATIENT
Start: 2018-09-07 | End: 2018-09-07 | Stop reason: HOSPADM

## 2018-09-07 RX ORDER — PROPOFOL 10 MG/ML
VIAL (ML) INTRAVENOUS AS NEEDED
Status: DISCONTINUED | OUTPATIENT
Start: 2018-09-07 | End: 2018-09-07 | Stop reason: SURG

## 2018-09-07 RX ORDER — SODIUM CHLORIDE 0.9 % (FLUSH) 0.9 %
3 SYRINGE (ML) INJECTION AS NEEDED
Status: DISCONTINUED | OUTPATIENT
Start: 2018-09-07 | End: 2018-09-07 | Stop reason: HOSPADM

## 2018-09-07 RX ORDER — SODIUM CHLORIDE, SODIUM LACTATE, POTASSIUM CHLORIDE, CALCIUM CHLORIDE 600; 310; 30; 20 MG/100ML; MG/100ML; MG/100ML; MG/100ML
1000 INJECTION, SOLUTION INTRAVENOUS CONTINUOUS
Status: DISCONTINUED | OUTPATIENT
Start: 2018-09-07 | End: 2018-09-07 | Stop reason: HOSPADM

## 2018-09-07 RX ADMIN — SODIUM CHLORIDE, POTASSIUM CHLORIDE, SODIUM LACTATE AND CALCIUM CHLORIDE 1000 ML: 600; 310; 30; 20 INJECTION, SOLUTION INTRAVENOUS at 08:42

## 2018-09-07 RX ADMIN — PROPOFOL 80 MG: 10 INJECTION, EMULSION INTRAVENOUS at 11:05

## 2018-09-07 RX ADMIN — PROPOFOL 80 MG: 10 INJECTION, EMULSION INTRAVENOUS at 11:09

## 2018-09-07 RX ADMIN — PROPOFOL 40 MG: 10 INJECTION, EMULSION INTRAVENOUS at 11:13

## 2018-09-07 RX ADMIN — PROPOFOL 40 MG: 10 INJECTION, EMULSION INTRAVENOUS at 11:16

## 2018-09-07 NOTE — DISCHARGE INSTRUCTIONS
Rest today  No pushing,pulling,tugging,heavy lifting, or strenuous activity   No major decision making,driving,or drinking alcoholic beverages for 24 hours due to the sedation you received  Always use good hand hygiene/washing technique  No driving on pain medication.    To assist you in voiding:  Drink plenty of fluids  Listen to running water while attempting to void.    If you are unable to urinate and you have an uncomfortable urge to void or it has been   6 hours since you were discharged, return to the Emergency Room.

## 2018-09-07 NOTE — ANESTHESIA POSTPROCEDURE EVALUATION
Patient: Ce Reynolds    Procedure Summary     Date:  09/07/18 Room / Location:  [facility] ENDOSCOPY 1 / [facility] ENDOSCOPY    Anesthesia Start:  1102 Anesthesia Stop:      Procedure:  COLONOSCOPY (N/A ) Diagnosis:       Internal hemorrhoids      Rectal bleed      (Internal hemorrhoids [K64.8])      (Rectal bleed [K62.5])    Surgeon:  Sherry Lake MD Provider:  Jose Noriega CRNA    Anesthesia Type:  MAC ASA Status:  3          Anesthesia Type: MAC  Last vitals  BP   130/83   Temp   98   Pulse   82   Resp   21    SpO2 99     Post Anesthesia Care and Evaluation    Patient location during evaluation: bedside  Patient participation: complete - patient participated  Level of consciousness: awake and alert  Pain score: 0  Pain management: adequate  Airway patency: patent  Anesthetic complications: No anesthetic complications  PONV Status: none  Cardiovascular status: acceptable  Respiratory status: acceptable and nasal cannula  Hydration status: acceptable

## 2018-09-07 NOTE — H&P (VIEW-ONLY)
Patient: Ce Reynolds    YOB: 1960    Date: 08/24/2018    Primary Care Provider: Lyssa Luciano APRN    Chief Complaint   Patient presents with   • Hemorrhoids       Subjective .     History of present illness: Patient is being seen in the office today for a evaluation and treatment of hemorrhoids for the last couple months. She complains of pain and bleeding per rectum. Her last colonoscopy was about 6 years ago.  No weight loss or anemia.  No significant abdominal pain.  No family history of colon cancer.    The following portions of the patient's history were reviewed and updated as appropriate: allergies, current medications, past family history, past medical history, past social history, past surgical history and problem list.      Review of Systems   Constitutional: Negative for chills, fever and unexpected weight change.   HENT: Negative for hearing loss, trouble swallowing and voice change.    Eyes: Negative for visual disturbance.   Respiratory: Negative for apnea, cough, chest tightness, shortness of breath and wheezing.    Cardiovascular: Negative for chest pain, palpitations and leg swelling.   Gastrointestinal: Positive for anal bleeding and rectal pain. Negative for abdominal distention, abdominal pain, blood in stool, constipation, diarrhea, nausea and vomiting.   Endocrine: Negative for cold intolerance and heat intolerance.   Genitourinary: Negative for difficulty urinating, dysuria and flank pain.   Musculoskeletal: Negative for back pain and gait problem.   Skin: Negative for color change, rash and wound.   Neurological: Negative for dizziness, syncope, speech difficulty, weakness, light-headedness, numbness and headaches.   Hematological: Negative for adenopathy. Does not bruise/bleed easily.   Psychiatric/Behavioral: Negative for confusion. The patient is not nervous/anxious.        Allergies:  Allergies   Allergen Reactions   • Flexeril [Cyclobenzaprine] Cough   •  "Multi-Vitamins  [Anti-Oxidant] Diarrhea   • Zanaflex [Tizanidine] Cough       Medications:    Current Outpatient Prescriptions:   •  albuterol (PROVENTIL HFA;VENTOLIN HFA) 108 (90 BASE) MCG/ACT inhaler, Inhale 2 puffs Every 4 (Four) Hours As Needed for Wheezing., Disp: 1 inhaler, Rfl: 0  •  amLODIPine (NORVASC) 10 MG tablet, Take 10 mg by mouth daily., Disp: , Rfl: 2  •  carvedilol (COREG) 3.125 MG tablet, , Disp: , Rfl:   •  escitalopram (LEXAPRO) 10 MG tablet, Take 10 mg by mouth Daily., Disp: , Rfl:   •  gabapentin (NEURONTIN) 800 MG tablet, Take 800 mg by mouth Every 6 (Six) Hours., Disp: , Rfl: 1  •  isosorbide mononitrate (IMDUR) 30 MG 24 hr tablet, Take 30 mg by mouth daily., Disp: , Rfl: 3  •  Loratadine (CLARITIN) 10 MG capsule, Take 10 mg by mouth Every Night., Disp: , Rfl:   •  meloxicam (MOBIC) 15 MG tablet, Take 15 mg by mouth daily., Disp: , Rfl: 1  •  methocarbamol (ROBAXIN) 500 MG tablet, Take 500 mg by mouth As Needed. TAKE EVEERY 8 HRS. PRN, Disp: , Rfl: 1  •  omeprazole (priLOSEC) 20 MG capsule, Take 20 mg by mouth Daily., Disp: , Rfl:   •  oxyCODONE-acetaminophen (PERCOCET)  MG per tablet, Take  tablets by mouth Every 6 (Six) Hours As Needed., Disp: , Rfl: 0  •  Phenylephrine-Witch Hazel 0.25-50 % gel, Use 3-4 times daily, Disp: 25 g, Rfl: 0  •  traMADol (ULTRAM) 50 MG tablet, Take 50 mg by mouth 2 (two) times a day., Disp: , Rfl: 1    History\"  Past Medical History:   Diagnosis Date   • Arthritis    • COPD (chronic obstructive pulmonary disease) (CMS/HCC)    • Depression    • Heart disease    • Hypertension    • Lumbar herniated disc        Past Surgical History:   Procedure Laterality Date   • APPENDECTOMY     • BACK SURGERY      fusion   • COLONOSCOPY     • ENDOSCOPY N/A 4/30/2017    Procedure: ESOPHAGOGASTRODUODENOSCOPY WITH BX;  Surgeon: Sherry Lake MD;  Location: [facility] ENDOSCOPY;  Service:    • HYSTERECTOMY     • OOPHORECTOMY         Family History   Problem Relation Age of " "Onset   • Family history unknown: Yes       Social History   Substance Use Topics   • Smoking status: Current Every Day Smoker     Packs/day: 1.00   • Smokeless tobacco: Not on file   • Alcohol use No        Objective     Vital Signs:   Vitals:    08/24/18 1433   BP: 118/64   Pulse: 98   Temp: 98.8 °F (37.1 °C)   TempSrc: Temporal Artery    SpO2: 99%   Weight: 42.9 kg (94 lb 9.6 oz)   Height: 149.9 cm (59\")       Physical Exam:   General Appearance:    Alert, cooperative, in no acute distress   Head:    Normocephalic, without obvious abnormality, atraumatic   Eyes:            Lids and lashes normal, conjunctivae and sclerae normal, no   icterus, no pallor, corneas clear, PERRLA   Ears:    Ears appear intact with no abnormalities noted   Throat:   No oral lesions, no thrush, oral mucosa moist   Neck:   No adenopathy, supple, trachea midline, no thyromegaly, no   carotid bruit, no JVD   Lungs:     Clear to auscultation,respirations regular, even and                  unlabored    Heart:    Regular rhythm and normal rate, normal S1 and S2, no            murmur, no gallop, no rub, no click   Chest Wall:    No abnormalities observed   Abdomen:     Normal bowel sounds, no masses, no organomegaly, soft        non-tender, non-distended, no guarding, no rebound                tenderness   Extremities:   Moves all extremities well, no edema, no cyanosis, no             redness   Pulses:   Pulses palpable and equal bilaterally   Skin:   No bleeding, bruising or rash   Lymph nodes:   No palpable adenopathy   Neurologic:   Cranial nerves 2 - 12 grossly intact, sensation intact, DTR       present and equal bilaterally  Rectal-no external hemorrhoids, internal hemorrhoids present.  Visible prolapse.       Results Review:   I reviewed the patient's new clinical results.    Assessment/Plan     1. Internal hemorrhoids    2. Rectal bleed        Patient scheduled for colonoscopy and banding of internal hemorrhoids.  Risk of bleeding, " infection and possible recurrence discussed and patient agreeable.    I discussed the patients findings and my recommendations with patient    Review of Systems was reviewed and confirmed as accurate today.    Electronically signed by Sherry Lake MD  08/24/18      .        Portions of this note have been scribed for Sherry Lake MD by Triny Oliveira MA 8/24/2018  2:47 PM

## 2018-09-07 NOTE — ANESTHESIA PREPROCEDURE EVALUATION
Anesthesia Evaluation     Patient summary reviewed and Nursing notes reviewed   NPO Solid Status: > 8 hours  NPO Liquid Status: > 8 hours           Airway   Mallampati: I  TM distance: >3 FB  Neck ROM: full  no difficulty expected  Dental - normal exam   (+) upper dentures    Pulmonary - normal exam   (+) a smoker Current, COPD moderate, shortness of breath, decreased breath sounds,   Cardiovascular - normal exam  Exercise tolerance: poor (<4 METS)    (+) hypertension, CAD, angina, OBERGON,       Neuro/Psych  (+) psychiatric history,     GI/Hepatic/Renal/Endo    (+)   liver disease,     Musculoskeletal     (+) arthralgias, back pain, chronic pain,   Abdominal  - normal exam   Substance History - negative use     OB/GYN negative ob/gyn ROS         Other   (+) arthritis                     Anesthesia Plan    ASA 3     MAC   (Risks and benefits discussed including risk of aspiration, recall and dental damage. All patient questions answered. Will continue with POC.)  intravenous induction   Anesthetic plan, all risks, benefits, and alternatives have been provided, discussed and informed consent has been obtained with: patient.

## 2018-11-12 ENCOUNTER — APPOINTMENT (OUTPATIENT)
Dept: GENERAL RADIOLOGY | Facility: HOSPITAL | Age: 58
End: 2018-11-12

## 2018-11-12 ENCOUNTER — TRANSCRIBE ORDERS (OUTPATIENT)
Dept: ADMINISTRATIVE | Facility: HOSPITAL | Age: 58
End: 2018-11-12

## 2018-11-12 ENCOUNTER — HOSPITAL ENCOUNTER (OUTPATIENT)
Facility: HOSPITAL | Age: 58
Setting detail: OBSERVATION
Discharge: HOME | End: 2018-11-13
Attending: EMERGENCY MEDICINE | Admitting: EMERGENCY MEDICINE

## 2018-11-12 ENCOUNTER — TRANSCRIBE ORDERS (OUTPATIENT)
Dept: ULTRASOUND IMAGING | Facility: HOSPITAL | Age: 58
End: 2018-11-12

## 2018-11-12 ENCOUNTER — APPOINTMENT (OUTPATIENT)
Dept: LAB | Facility: HOSPITAL | Age: 58
End: 2018-11-12

## 2018-11-12 DIAGNOSIS — E83.42 HYPOMAGNESEMIA: ICD-10-CM

## 2018-11-12 DIAGNOSIS — K76.9 LIVER DISEASE, UNSPECIFIED: Primary | ICD-10-CM

## 2018-11-12 DIAGNOSIS — R79.9 ABNORMAL BLOOD CHEMISTRY: Primary | ICD-10-CM

## 2018-11-12 DIAGNOSIS — E87.6 HYPOKALEMIA: Primary | ICD-10-CM

## 2018-11-12 LAB
ALBUMIN SERPL-MCNC: 4.2 G/DL (ref 3.5–5)
ALBUMIN SERPL-MCNC: 4.4 G/DL (ref 3.5–5)
ALBUMIN/GLOB SERPL: 1.4 G/DL (ref 1–2)
ALBUMIN/GLOB SERPL: 1.5 G/DL (ref 1–2)
ALP SERPL-CCNC: 86 U/L (ref 38–126)
ALP SERPL-CCNC: 87 U/L (ref 38–126)
ALT SERPL W P-5'-P-CCNC: 34 U/L (ref 13–69)
ALT SERPL W P-5'-P-CCNC: 41 U/L (ref 13–69)
ANION GAP SERPL CALCULATED.3IONS-SCNC: 10.5 MMOL/L (ref 10–20)
ANION GAP SERPL CALCULATED.3IONS-SCNC: 10.7 MMOL/L (ref 10–20)
AST SERPL-CCNC: 48 U/L (ref 15–46)
AST SERPL-CCNC: 51 U/L (ref 15–46)
BILIRUB SERPL-MCNC: 0.8 MG/DL (ref 0.2–1.3)
BILIRUB SERPL-MCNC: 0.8 MG/DL (ref 0.2–1.3)
BUN BLD-MCNC: 6 MG/DL (ref 7–20)
BUN BLD-MCNC: 7 MG/DL (ref 7–20)
BUN/CREAT SERPL: 14 (ref 7.1–23.5)
BUN/CREAT SERPL: 15 (ref 7.1–23.5)
CALCIUM SPEC-SCNC: 9.2 MG/DL (ref 8.4–10.2)
CALCIUM SPEC-SCNC: 9.2 MG/DL (ref 8.4–10.2)
CHLORIDE SERPL-SCNC: 91 MMOL/L (ref 98–107)
CHLORIDE SERPL-SCNC: 93 MMOL/L (ref 98–107)
CO2 SERPL-SCNC: 29 MMOL/L (ref 26–30)
CO2 SERPL-SCNC: 30 MMOL/L (ref 26–30)
CREAT BLD-MCNC: 0.4 MG/DL (ref 0.6–1.3)
CREAT BLD-MCNC: 0.5 MG/DL (ref 0.6–1.3)
GFR SERPL CREATININE-BSD FRML MDRD: 127 ML/MIN/1.73
GFR SERPL CREATININE-BSD FRML MDRD: >150 ML/MIN/1.73
GLOBULIN UR ELPH-MCNC: 3 GM/DL
GLOBULIN UR ELPH-MCNC: 3 GM/DL
GLUCOSE BLD-MCNC: 94 MG/DL (ref 74–98)
GLUCOSE BLD-MCNC: 98 MG/DL (ref 74–98)
MAGNESIUM SERPL-MCNC: 1.1 MG/DL (ref 1.6–2.3)
POTASSIUM BLD-SCNC: 2.5 MMOL/L (ref 3.5–5.1)
POTASSIUM BLD-SCNC: 2.7 MMOL/L (ref 3.5–5.1)
PROT SERPL-MCNC: 7.2 G/DL (ref 6.3–8.2)
PROT SERPL-MCNC: 7.4 G/DL (ref 6.3–8.2)
SODIUM BLD-SCNC: 129 MMOL/L (ref 137–145)
SODIUM BLD-SCNC: 130 MMOL/L (ref 137–145)

## 2018-11-12 PROCEDURE — 25010000003 POTASSIUM CHLORIDE 10 MEQ/100ML SOLUTION: Performed by: EMERGENCY MEDICINE

## 2018-11-12 PROCEDURE — 36415 COLL VENOUS BLD VENIPUNCTURE: CPT | Performed by: NURSE PRACTITIONER

## 2018-11-12 PROCEDURE — 80053 COMPREHEN METABOLIC PANEL: CPT | Performed by: NURSE PRACTITIONER

## 2018-11-12 PROCEDURE — 93005 ELECTROCARDIOGRAM TRACING: CPT | Performed by: EMERGENCY MEDICINE

## 2018-11-12 PROCEDURE — 99284 EMERGENCY DEPT VISIT MOD MDM: CPT

## 2018-11-12 PROCEDURE — 96365 THER/PROPH/DIAG IV INF INIT: CPT

## 2018-11-12 PROCEDURE — 80053 COMPREHEN METABOLIC PANEL: CPT | Performed by: EMERGENCY MEDICINE

## 2018-11-12 PROCEDURE — 71046 X-RAY EXAM CHEST 2 VIEWS: CPT

## 2018-11-12 PROCEDURE — G0378 HOSPITAL OBSERVATION PER HR: HCPCS

## 2018-11-12 PROCEDURE — 96361 HYDRATE IV INFUSION ADD-ON: CPT

## 2018-11-12 PROCEDURE — 25010000002 MAGNESIUM SULFATE 2 GM/50ML SOLUTION: Performed by: EMERGENCY MEDICINE

## 2018-11-12 PROCEDURE — 83735 ASSAY OF MAGNESIUM: CPT | Performed by: EMERGENCY MEDICINE

## 2018-11-12 PROCEDURE — 99219 PR INITIAL OBSERVATION CARE/DAY 50 MINUTES: CPT | Performed by: INTERNAL MEDICINE

## 2018-11-12 PROCEDURE — 96367 TX/PROPH/DG ADDL SEQ IV INF: CPT

## 2018-11-12 PROCEDURE — 96366 THER/PROPH/DIAG IV INF ADDON: CPT

## 2018-11-12 RX ORDER — MELOXICAM 7.5 MG/1
15 TABLET ORAL DAILY
Status: DISCONTINUED | OUTPATIENT
Start: 2018-11-13 | End: 2018-11-13 | Stop reason: HOSPADM

## 2018-11-12 RX ORDER — CETIRIZINE HYDROCHLORIDE 10 MG/1
10 TABLET ORAL DAILY
Status: DISCONTINUED | OUTPATIENT
Start: 2018-11-13 | End: 2018-11-12

## 2018-11-12 RX ORDER — ISOSORBIDE MONONITRATE 30 MG/1
30 TABLET, EXTENDED RELEASE ORAL DAILY
Status: DISCONTINUED | OUTPATIENT
Start: 2018-11-13 | End: 2018-11-12

## 2018-11-12 RX ORDER — POTASSIUM CHLORIDE 7.45 MG/ML
10 INJECTION INTRAVENOUS ONCE
Status: COMPLETED | OUTPATIENT
Start: 2018-11-12 | End: 2018-11-12

## 2018-11-12 RX ORDER — SODIUM CHLORIDE 9 MG/ML
100 INJECTION, SOLUTION INTRAVENOUS CONTINUOUS
Status: DISCONTINUED | OUTPATIENT
Start: 2018-11-12 | End: 2018-11-13 | Stop reason: HOSPADM

## 2018-11-12 RX ORDER — OXYCODONE AND ACETAMINOPHEN 10; 325 MG/1; MG/1
1 TABLET ORAL NIGHTLY
Status: DISCONTINUED | OUTPATIENT
Start: 2018-11-12 | End: 2018-11-12

## 2018-11-12 RX ORDER — METHOCARBAMOL 500 MG/1
500 TABLET, FILM COATED ORAL EVERY 8 HOURS PRN
Status: DISCONTINUED | OUTPATIENT
Start: 2018-11-12 | End: 2018-11-13 | Stop reason: HOSPADM

## 2018-11-12 RX ORDER — POTASSIUM CHLORIDE 7.45 MG/ML
10 INJECTION INTRAVENOUS ONCE
Status: COMPLETED | OUTPATIENT
Start: 2018-11-12 | End: 2018-11-13

## 2018-11-12 RX ORDER — MAGNESIUM SULFATE HEPTAHYDRATE 40 MG/ML
2 INJECTION, SOLUTION INTRAVENOUS ONCE
Status: COMPLETED | OUTPATIENT
Start: 2018-11-12 | End: 2018-11-13

## 2018-11-12 RX ORDER — POTASSIUM CHLORIDE 750 MG/1
40 CAPSULE, EXTENDED RELEASE ORAL ONCE
Status: COMPLETED | OUTPATIENT
Start: 2018-11-12 | End: 2018-11-12

## 2018-11-12 RX ORDER — SODIUM CHLORIDE 0.9 % (FLUSH) 0.9 %
3-10 SYRINGE (ML) INJECTION AS NEEDED
Status: DISCONTINUED | OUTPATIENT
Start: 2018-11-12 | End: 2018-11-13 | Stop reason: HOSPADM

## 2018-11-12 RX ORDER — NICOTINE 21 MG/24HR
1 PATCH, TRANSDERMAL 24 HOURS TRANSDERMAL EVERY 24 HOURS
Status: DISCONTINUED | OUTPATIENT
Start: 2018-11-12 | End: 2018-11-13 | Stop reason: HOSPADM

## 2018-11-12 RX ORDER — TRAMADOL HYDROCHLORIDE 50 MG/1
50 TABLET ORAL EVERY 12 HOURS PRN
Status: DISCONTINUED | OUTPATIENT
Start: 2018-11-12 | End: 2018-11-13 | Stop reason: HOSPADM

## 2018-11-12 RX ORDER — OXYCODONE AND ACETAMINOPHEN 10; 325 MG/1; MG/1
1 TABLET ORAL
Status: DISCONTINUED | OUTPATIENT
Start: 2018-11-13 | End: 2018-11-13 | Stop reason: HOSPADM

## 2018-11-12 RX ORDER — SODIUM CHLORIDE 0.9 % (FLUSH) 0.9 %
3 SYRINGE (ML) INJECTION EVERY 12 HOURS SCHEDULED
Status: DISCONTINUED | OUTPATIENT
Start: 2018-11-12 | End: 2018-11-13 | Stop reason: HOSPADM

## 2018-11-12 RX ORDER — GABAPENTIN 300 MG/1
600 CAPSULE ORAL EVERY 8 HOURS SCHEDULED
Status: DISCONTINUED | OUTPATIENT
Start: 2018-11-12 | End: 2018-11-13 | Stop reason: HOSPADM

## 2018-11-12 RX ORDER — MAGNESIUM SULFATE HEPTAHYDRATE 40 MG/ML
2 INJECTION, SOLUTION INTRAVENOUS ONCE
Status: COMPLETED | OUTPATIENT
Start: 2018-11-12 | End: 2018-11-12

## 2018-11-12 RX ORDER — OXYCODONE AND ACETAMINOPHEN 10; 325 MG/1; MG/1
1 TABLET ORAL NIGHTLY
Status: DISCONTINUED | OUTPATIENT
Start: 2018-11-12 | End: 2018-11-13 | Stop reason: HOSPADM

## 2018-11-12 RX ADMIN — POTASSIUM CHLORIDE 10 MEQ: 7.46 INJECTION, SOLUTION INTRAVENOUS at 21:18

## 2018-11-12 RX ADMIN — MAGNESIUM SULFATE IN WATER 2 G: 40 INJECTION, SOLUTION INTRAVENOUS at 18:16

## 2018-11-12 RX ADMIN — POTASSIUM CHLORIDE 40 MEQ: 750 CAPSULE, EXTENDED RELEASE ORAL at 18:19

## 2018-11-12 RX ADMIN — TRAMADOL HYDROCHLORIDE 50 MG: 50 TABLET, FILM COATED ORAL at 21:18

## 2018-11-12 RX ADMIN — POTASSIUM CHLORIDE 10 MEQ: 10 INJECTION, SOLUTION INTRAVENOUS at 17:50

## 2018-11-12 RX ADMIN — SODIUM CHLORIDE 100 ML/HR: 9 INJECTION, SOLUTION INTRAVENOUS at 17:48

## 2018-11-12 RX ADMIN — POTASSIUM CHLORIDE 10 MEQ: 7.46 INJECTION, SOLUTION INTRAVENOUS at 16:32

## 2018-11-12 RX ADMIN — Medication 3 ML: at 21:20

## 2018-11-12 RX ADMIN — NICOTINE 1 PATCH: 21 PATCH TRANSDERMAL at 18:03

## 2018-11-12 RX ADMIN — POTASSIUM CHLORIDE 10 MEQ: 7.46 INJECTION, SOLUTION INTRAVENOUS at 19:41

## 2018-11-12 RX ADMIN — OXYCODONE HYDROCHLORIDE AND ACETAMINOPHEN 1 TABLET: 10; 325 TABLET ORAL at 18:45

## 2018-11-12 RX ADMIN — METHOCARBAMOL 500 MG: 500 TABLET ORAL at 21:35

## 2018-11-12 RX ADMIN — GABAPENTIN 600 MG: 300 CAPSULE ORAL at 21:19

## 2018-11-12 RX ADMIN — POTASSIUM CHLORIDE 10 MEQ: 7.46 INJECTION, SOLUTION INTRAVENOUS at 15:25

## 2018-11-12 RX ADMIN — POTASSIUM CHLORIDE 10 MEQ: 7.46 INJECTION, SOLUTION INTRAVENOUS at 23:02

## 2018-11-12 NOTE — ED NOTES
Attempted to give report.  RN was in a pt room and will call me back.     Alva Villarreal, RN  11/12/18 5117

## 2018-11-12 NOTE — ED NOTES
Chapin Ornelas, assigned Room 329/Tele @ 3633. RAMESH Calle, notified.      Deana Wall  11/12/18 6580
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
## 2018-11-13 VITALS
BODY MASS INDEX: 18.22 KG/M2 | WEIGHT: 90.4 LBS | RESPIRATION RATE: 17 BRPM | DIASTOLIC BLOOD PRESSURE: 95 MMHG | OXYGEN SATURATION: 98 % | SYSTOLIC BLOOD PRESSURE: 126 MMHG | TEMPERATURE: 98.3 F | HEIGHT: 59 IN | HEART RATE: 82 BPM

## 2018-11-13 LAB
ALBUMIN SERPL-MCNC: 3.8 G/DL (ref 3.5–5)
ANION GAP SERPL CALCULATED.3IONS-SCNC: 10.4 MMOL/L (ref 10–20)
ANION GAP SERPL CALCULATED.3IONS-SCNC: 8.5 MMOL/L (ref 10–20)
BUN BLD-MCNC: 3 MG/DL (ref 7–20)
BUN BLD-MCNC: 4 MG/DL (ref 7–20)
BUN/CREAT SERPL: 10 (ref 7.1–23.5)
BUN/CREAT SERPL: 13.3 (ref 7.1–23.5)
CALCIUM SPEC-SCNC: 8.5 MG/DL (ref 8.4–10.2)
CALCIUM SPEC-SCNC: 8.7 MG/DL (ref 8.4–10.2)
CHLORIDE SERPL-SCNC: 97 MMOL/L (ref 98–107)
CHLORIDE SERPL-SCNC: 98 MMOL/L (ref 98–107)
CO2 SERPL-SCNC: 25 MMOL/L (ref 26–30)
CO2 SERPL-SCNC: 28 MMOL/L (ref 26–30)
CREAT BLD-MCNC: 0.3 MG/DL (ref 0.6–1.3)
CREAT BLD-MCNC: 0.3 MG/DL (ref 0.6–1.3)
CREAT UR-MCNC: 22.7 MG/DL
GFR SERPL CREATININE-BSD FRML MDRD: >150 ML/MIN/1.73
GFR SERPL CREATININE-BSD FRML MDRD: >150 ML/MIN/1.73
GLUCOSE BLD-MCNC: 106 MG/DL (ref 74–98)
GLUCOSE BLD-MCNC: 84 MG/DL (ref 74–98)
MAGNESIUM SERPL-MCNC: 1.7 MG/DL (ref 1.6–2.3)
PHOSPHATE SERPL-MCNC: 2.4 MG/DL (ref 2.5–4.5)
PHOSPHATE SERPL-MCNC: 2.5 MG/DL (ref 2.5–4.5)
POTASSIUM BLD-SCNC: 3.4 MMOL/L (ref 3.5–5.1)
POTASSIUM BLD-SCNC: 3.5 MMOL/L (ref 3.5–5.1)
POTASSIUM UR-SCNC: 23.9 MMOL/L
SODIUM BLD-SCNC: 130 MMOL/L (ref 137–145)
SODIUM BLD-SCNC: 130 MMOL/L (ref 137–145)

## 2018-11-13 PROCEDURE — 83735 ASSAY OF MAGNESIUM: CPT | Performed by: INTERNAL MEDICINE

## 2018-11-13 PROCEDURE — 25010000002 MAGNESIUM SULFATE 2 GM/50ML SOLUTION: Performed by: INTERNAL MEDICINE

## 2018-11-13 PROCEDURE — 80069 RENAL FUNCTION PANEL: CPT | Performed by: INTERNAL MEDICINE

## 2018-11-13 PROCEDURE — 80048 BASIC METABOLIC PNL TOTAL CA: CPT | Performed by: INTERNAL MEDICINE

## 2018-11-13 PROCEDURE — 84133 ASSAY OF URINE POTASSIUM: CPT | Performed by: INTERNAL MEDICINE

## 2018-11-13 PROCEDURE — G0378 HOSPITAL OBSERVATION PER HR: HCPCS

## 2018-11-13 PROCEDURE — 99217 PR OBSERVATION CARE DISCHARGE MANAGEMENT: CPT | Performed by: INTERNAL MEDICINE

## 2018-11-13 PROCEDURE — 82570 ASSAY OF URINE CREATININE: CPT | Performed by: INTERNAL MEDICINE

## 2018-11-13 PROCEDURE — 96366 THER/PROPH/DIAG IV INF ADDON: CPT

## 2018-11-13 PROCEDURE — 84100 ASSAY OF PHOSPHORUS: CPT | Performed by: INTERNAL MEDICINE

## 2018-11-13 RX ORDER — OXYCODONE AND ACETAMINOPHEN 10; 325 MG/1; MG/1
1 TABLET ORAL ONCE AS NEEDED
Status: COMPLETED | OUTPATIENT
Start: 2018-11-13 | End: 2018-11-13

## 2018-11-13 RX ORDER — AMLODIPINE BESYLATE 5 MG/1
10 TABLET ORAL DAILY
Status: DISCONTINUED | OUTPATIENT
Start: 2018-11-13 | End: 2018-11-13 | Stop reason: HOSPADM

## 2018-11-13 RX ORDER — OXYCODONE AND ACETAMINOPHEN 10; 325 MG/1; MG/1
1 TABLET ORAL ONCE
Status: COMPLETED | OUTPATIENT
Start: 2018-11-13 | End: 2018-11-13

## 2018-11-13 RX ORDER — ASPIRIN 325 MG
325 TABLET ORAL EVERY 6 HOURS PRN
Status: ON HOLD | COMMUNITY
End: 2018-12-26 | Stop reason: ALTCHOICE

## 2018-11-13 RX ORDER — POTASSIUM CHLORIDE 20 MEQ/1
20 TABLET, EXTENDED RELEASE ORAL 2 TIMES DAILY
Qty: 14 TABLET | Refills: 0 | Status: ON HOLD | OUTPATIENT
Start: 2018-11-13 | End: 2018-12-25

## 2018-11-13 RX ORDER — MAGNESIUM SULFATE HEPTAHYDRATE 40 MG/ML
2 INJECTION, SOLUTION INTRAVENOUS ONCE
Status: COMPLETED | OUTPATIENT
Start: 2018-11-13 | End: 2018-11-13

## 2018-11-13 RX ORDER — POTASSIUM CHLORIDE 750 MG/1
40 CAPSULE, EXTENDED RELEASE ORAL ONCE
Status: COMPLETED | OUTPATIENT
Start: 2018-11-13 | End: 2018-11-13

## 2018-11-13 RX ADMIN — POTASSIUM CHLORIDE 40 MEQ: 750 CAPSULE, EXTENDED RELEASE ORAL at 10:40

## 2018-11-13 RX ADMIN — MAGNESIUM SULFATE IN WATER 2 G: 40 INJECTION, SOLUTION INTRAVENOUS at 09:14

## 2018-11-13 RX ADMIN — POTASSIUM PHOSPHATE, MONOBASIC 500 MG: 500 TABLET, SOLUBLE ORAL at 10:40

## 2018-11-13 RX ADMIN — MELOXICAM 15 MG: 7.5 TABLET ORAL at 09:14

## 2018-11-13 RX ADMIN — OXYCODONE HYDROCHLORIDE AND ACETAMINOPHEN 1 TABLET: 10; 325 TABLET ORAL at 10:41

## 2018-11-13 RX ADMIN — METHOCARBAMOL 500 MG: 500 TABLET ORAL at 04:43

## 2018-11-13 RX ADMIN — GABAPENTIN 600 MG: 300 CAPSULE ORAL at 06:37

## 2018-11-13 RX ADMIN — MAGNESIUM SULFATE IN WATER 2 G: 40 INJECTION, SOLUTION INTRAVENOUS at 00:03

## 2018-11-13 RX ADMIN — AMLODIPINE BESYLATE 10 MG: 5 TABLET ORAL at 09:15

## 2018-11-13 RX ADMIN — Medication 3 ML: at 09:17

## 2018-11-13 RX ADMIN — OXYCODONE HYDROCHLORIDE AND ACETAMINOPHEN 1 TABLET: 10; 325 TABLET ORAL at 04:43

## 2018-11-13 NOTE — PROGRESS NOTES
Case Management Discharge Note    Final Note: Discharged home     Destination      No service has been selected for the patient.      Durable Medical Equipment      No service has been selected for the patient.      Dialysis/Infusion      No service has been selected for the patient.      Home Medical Care      No service has been selected for the patient.      Community Resources      No service has been selected for the patient.        Other: (private car )    Final Discharge Disposition Code: 01 - home or self-care

## 2018-11-13 NOTE — PLAN OF CARE
Problem: Patient Care Overview  Goal: Plan of Care Review  Outcome: Ongoing (interventions implemented as appropriate)   11/13/18 2519   Coping/Psychosocial   Plan of Care Reviewed With patient   OTHER   Outcome Summary potassium runs,mag runs,po pain med required

## 2018-11-13 NOTE — DISCHARGE SUMMARY
[facility]   DISCHARGE SUMMARY      Name:  Ce Reynolds   Age:  58 y.o.  Sex:  female  :  1960  MRN:  [id number]   Visit Number:  [id number]    Admission Date:  2018  Date of Discharge:  2018  Primary Care Physician:  Lyssa Luciano APRN    Discharge Diagnoses:      1.  Acute hypokalemia, improved  2.  Acute hypomagnesemia, improved   3.  Mild hyponatremia, stable   4.  Chronic low back pain with chronic opioid dependency   5.  Active nicotine abuse, smokes 1 ppd      Hypokalemia    Chronic back pain    Hypomagnesemia      Presenting Problem:    Hypokalemia [E87.6]     Consults:     Consults     No orders found for last 30 day(s).        Consulting Physician(s)             None            Procedures Performed:           History of presenting illness/Hospital Course:  Patient is a 58-year-old female with medical history of chronic low back pain with chronic opioid dependency, hypertension, hard of hearing, active nicotine abuse, COPD, and history of pancreatitis who presented to the ER for abnormal labs.  She had routine lab work done which was ordered by her primary care physician and her potassium level was noted to be 2.7 that she was called and asked to come to the ER for repeat labs.  Here in the ER patient's potassium level was noted to be even lower at 2.5 with a magnesium level of 1.1.  Patient had no significant complaints with these abnormal lab findings.  On initial encounter patient denied any significant diarrhea or abdominal pain, nausea or vomiting.  However today patient does report that she has chronic diarrhea usually once in the morning and with every meal she eats and this has been ongoing for many years and she does not think much of it.  I did advise her that the chronic diarrhea could be potentially contributing to her low potassium levels as well as her low magnesium levels.  She has received appropriate repletion of potassium as well  as magnesium both orally and intravenously with her potassium and magnesium levels improving on repeat lab work.  I have advised the patient to continue taking potassium 20 mEq twice daily for a week and to have repeat lab work done with her primary care physician.  I am hesitant to place the patient on any oral magnesium supplementation as it can worsen her diarrhea.  I have placed her on pancrelipase 3 times a day with meals due to her history of pancreatitis.  This morning, patient has no complaints of shortness of air, chest pain, headache, dizziness or palpitations.  She is equal to go home so that she can go back to taking her pain medications she takes them at home.  Patient will be stable for discharge this morning and I have advised her to follow-up with her primary care physician for repeat lab work to monitor her electrolytes within one week of discharge.  She'll be provided with information regarding high magnesium diet.    Vital Signs:    Temp:  [97.5 °F (36.4 °C)-98.4 °F (36.9 °C)] 98.1 °F (36.7 °C)  Heart Rate:  [68-94] 74  Resp:  [14-18] 18  BP: (131-158)/() 131/98    Physical Exam:    General Appearance:  Alert and cooperative, not in any acute distress.   Head:  Atraumatic and normocephalic, without obvious abnormality.   Eyes:          PERRLA, conjunctivae and sclerae normal, no Icterus. No pallor. Extra-occular movements are within normal limits.   Ears:  Ears appear intact with no abnormalities noted.   Throat: No oral lesions, no thrush, oral mucosa moist.   Neck: Supple, trachea midline, no thyromegaly, no carotid bruit.   Back:   No kyphoscoliosis present. No tenderness to palpation,   range of motion normal.   Lungs:   Chest shape is normal. Breath sounds heard bilaterally equally.  No crackles or wheezing. No Pleural rub or bronchial breathing.   Heart:  Normal S1 and S2, no murmur, no gallop, no rub. No JVD.   Abdomen:   Normal bowel sounds, no masses, no organomegaly. Soft,  non-tender, non-distended, no guarding, no rebound tenderness.   Extremities: Moves all extremities well, no edema, no cyanosis, no clubbing.   Pulses: Pulses palpable and equal bilaterally.   Skin: No bleeding, bruising or rash.   Lymph nodes: No palpable adenopathy.   Neurologic: Alert and oriented x 3. Moves all four limbs equally. No tremors. No facial asymetry.     Pertinent Lab Results:     Results from last 7 days   Lab Units  11/13/18   0809  11/13/18   0004  11/12/18   1406  11/12/18   1200   SODIUM mmol/L  130*  130*  130*  129*   POTASSIUM mmol/L  3.4*  3.5  2.5*  2.7*   CHLORIDE mmol/L  98  97*  93*  91*   CO2 mmol/L  25.0*  28.0  29.0  30.0   BUN mg/dL  3*  4*  6*  7   CREATININE mg/dL  0.30*  0.30*  0.40*  0.50*   CALCIUM mg/dL  8.5  8.7  9.2  9.2   BILIRUBIN mg/dL   --    --   0.8  0.8   ALK PHOS U/L   --    --   86  87   ALT (SGPT) U/L   --    --   34  41   AST (SGOT) U/L   --    --   48*  51*   GLUCOSE mg/dL  106*  84  98  94                                       Invalid input(s): USDES,  BLOODU, NITRITITE, BACT, EP  Pain Management Panel     Pain Management Panel Latest Ref Rng & Units 11/13/2018    CREATININE UR mg/dL 22.7              Pertinent Radiology Results:    Imaging Results (all)     Procedure Component Value Units Date/Time    XR Chest 2 View [[id number]] Collected:  11/12/18 1621     Updated:  11/12/18 1623    Narrative:       PROCEDURE: XR CHEST 2 VW-        HISTORY: electrolyte ab; E87.6-Hypokalemia; E83.42-Hypomagnesemia     COMPARISON: September 1, 2017.     FINDINGS: The heart is normal in size. The mediastinum is unremarkable.  The lungs are clear. There is no pneumothorax. There are no acute  osseous abnormalities.           Impression:       No acute cardiopulmonary process.           This report was finalized on 11/12/2018 4:21 PM by Tacos Day M.D..          Condition on Discharge:      Stable.    Code status during the hospital stay:    Code Status and Medical  Interventions:   Ordered at: 11/12/18 7602     Level Of Support Discussed With:    Patient     Code Status:    CPR     Medical Interventions (Level of Support Prior to Arrest):    Full       Discharge Disposition:        Discharge Medications:       Discharge Medications      New Medications      Instructions Start Date   pancrelipase (Lip-Prot-Amyl) 28191 units capsule delayed-release particles capsule  Commonly known as:  CREON   12,000 units of lipase, Oral, 3 Times Daily With Meals      potassium chloride 20 MEQ CR tablet  Commonly known as:  K-DUR,KLOR-CON   20 mEq, Oral, 2 Times Daily         Continue These Medications      Instructions Start Date   albuterol 108 (90 Base) MCG/ACT inhaler  Commonly known as:  PROVENTIL HFA;VENTOLIN HFA   2 puffs, Inhalation, Every 4 Hours PRN      amLODIPine 10 MG tablet  Commonly known as:  NORVASC   10 mg, Oral, Daily      aspirin 325 MG tablet   325 mg, Oral, Every 6 Hours PRN      gabapentin 800 MG tablet  Commonly known as:  NEURONTIN   800 mg, Oral, Every 6 Hours      meloxicam 15 MG tablet  Commonly known as:  MOBIC   15 mg, Oral, Daily      methocarbamol 500 MG tablet  Commonly known as:  ROBAXIN   500 mg, Oral, 3 Times Daily PRN, TAKE EVEERY 8 HRS. PRN      oxyCODONE-acetaminophen  MG per tablet  Commonly known as:  PERCOCET    tablets, Oral, Every 6 Hours PRN      Phenylephrine-Witch Hazel 0.25-50 % gel   Use 3-4 times daily      traMADol 50 MG tablet  Commonly known as:  ULTRAM   50 mg, Oral, 2 Times Daily             Discharge Diet:     Diet Instructions     Diet: Regular      Discharge Diet:  Regular          Activity at Discharge:     Activity Instructions     Activity as Tolerated            Follow-up Appointments:    Additional Instructions for the Follow-ups that You Need to Schedule     Discharge Follow-up with PCP   As directed       Currently Documented PCP:    Lyssa Luciano APRN    PCP Phone Number:    [phone]     Follow Up Details:   1 week for repeat renal function panel/electrolyte           Follow-up Information     Lyssa Luciano APRN Follow up on 11/20/2018.    Specialty:  Family Medicine  Why:  1 week for repeat renal function panel/electrolyte @1PM  Contact information:  [address]  [address], [address] [address] [phone]                   No future appointments.    Additional Instructions for the Follow-ups that You Need to Schedule     Discharge Follow-up with PCP   As directed       Currently Documented PCP:    Lyssa Luciano APRN    PCP Phone Number:    [phone]     Follow Up Details:  1 week for repeat renal function panel/electrolyte               Test Results Pending at Discharge:           Toñito Valencia MD  11/13/18  11:00 AM    Time spent: 25 minutes    Dictated utilizing Dragon dictation.

## 2018-11-13 NOTE — PROGRESS NOTES
Discharge Planning Assessment  [facility]     Patient Name: Ce Reynolds  MRN: [id number]  Today's Date: 11/13/2018    Admit Date: 11/12/2018    Discharge Needs Assessment     Row Name 11/13/18 1002       Living Environment    Lives With  significant other    Current Living Arrangements  home/apartment/condo    Primary Care Provided by  self    Provides Primary Care For  no one    Family Caregiver if Needed  significant other    Family Caregiver Names  Julius Kat     Quality of Family Relationships  involved       Transition Planning    Patient/Family Anticipated Services at Transition  none       Discharge Needs Assessment    Readmission Within the Last 30 Days  no previous admission in last 30 days    Concerns to be Addressed  no discharge needs identified    Equipment Currently Used at Home  oxygen;nebulizer    Anticipated Changes Related to Illness  none        Discharge Plan     Row Name 11/13/18 1004       Plan    Plan  Home     Patient/Family in Agreement with Plan  yes    Plan Comments  Spoke to pt regarding discahrge plans She lives with her SO Independent with ADLS Has Oxygen with [facility] 1.5 -2 LNC  PRN confirmed address and phone number         Destination      No service coordination in this encounter.      Durable Medical Equipment      No service coordination in this encounter.      Dialysis/Infusion      No service coordination in this encounter.      Home Medical Care      No service coordination in this encounter.      Community Resources      No service coordination in this encounter.          Demographic Summary     Row Name 11/13/18 1000       General Information    Admission Type  observation    Required Notices Provided  Observation Status Notice    Referral Source  admission list        Functional Status     Row Name 11/13/18 1002       Functional Status    Usual Activity Tolerance  good       Functional Status, IADL    Medications  independent    Meal Preparation  independent     Housekeeping  independent    Laundry  independent    Shopping  independent       Mental Status    General Appearance WDL  WDL       Employment/    Employment Status  disabled        Psychosocial    No documentation.       Abuse/Neglect    No documentation.       Legal    No documentation.       Substance Abuse    No documentation.       Patient Forms    No documentation.           Yue Lamb

## 2018-11-29 ENCOUNTER — HOSPITAL ENCOUNTER (OUTPATIENT)
Dept: ULTRASOUND IMAGING | Facility: HOSPITAL | Age: 58
Discharge: HOME | End: 2018-11-29
Admitting: NURSE PRACTITIONER

## 2018-11-29 DIAGNOSIS — K76.9 LIVER DISEASE, UNSPECIFIED: ICD-10-CM

## 2018-11-29 PROCEDURE — 76700 US EXAM ABDOM COMPLETE: CPT

## 2018-12-24 ENCOUNTER — APPOINTMENT (OUTPATIENT)
Dept: GENERAL RADIOLOGY | Facility: HOSPITAL | Age: 58
End: 2018-12-24

## 2018-12-24 ENCOUNTER — HOSPITAL ENCOUNTER (INPATIENT)
Facility: HOSPITAL | Age: 58
LOS: 4 days | Discharge: HOME | End: 2018-12-28
Attending: EMERGENCY MEDICINE | Admitting: INTERNAL MEDICINE

## 2018-12-24 ENCOUNTER — APPOINTMENT (OUTPATIENT)
Dept: CT IMAGING | Facility: HOSPITAL | Age: 58
End: 2018-12-24

## 2018-12-24 DIAGNOSIS — Z74.09 IMPAIRED MOBILITY AND ADLS: ICD-10-CM

## 2018-12-24 DIAGNOSIS — E87.1 HYPONATREMIA: ICD-10-CM

## 2018-12-24 DIAGNOSIS — J44.9 CHRONIC OBSTRUCTIVE PULMONARY DISEASE, UNSPECIFIED COPD TYPE (HCC): ICD-10-CM

## 2018-12-24 DIAGNOSIS — Z78.9 IMPAIRED MOBILITY AND ADLS: ICD-10-CM

## 2018-12-24 DIAGNOSIS — J18.9 PNEUMONIA OF BOTH LUNGS DUE TO INFECTIOUS ORGANISM, UNSPECIFIED PART OF LUNG: Primary | ICD-10-CM

## 2018-12-24 DIAGNOSIS — R06.02 SHORTNESS OF BREATH: ICD-10-CM

## 2018-12-24 DIAGNOSIS — Z74.09 IMPAIRED FUNCTIONAL MOBILITY, BALANCE, GAIT, AND ENDURANCE: ICD-10-CM

## 2018-12-24 LAB
A-A DO2: ABNORMAL MMHG
ALBUMIN SERPL-MCNC: 3.5 G/DL (ref 3.5–5)
ALBUMIN/GLOB SERPL: 1 G/DL (ref 1–2)
ALP SERPL-CCNC: 118 U/L (ref 38–126)
ALT SERPL W P-5'-P-CCNC: 13 U/L (ref 13–69)
AMPHET+METHAMPHET UR QL: NEGATIVE
AMPHETAMINES UR QL: NEGATIVE
ANION GAP SERPL CALCULATED.3IONS-SCNC: 13.6 MMOL/L (ref 10–20)
ARTERIAL PATENCY WRIST A: ABNORMAL
AST SERPL-CCNC: 37 U/L (ref 15–46)
ATMOSPHERIC PRESS: 740 MMHG
BACTERIA UR QL AUTO: ABNORMAL /HPF
BARBITURATES UR QL SCN: NEGATIVE
BASE EXCESS BLDA CALC-SCNC: -0.5 MMOL/L (ref 0–2)
BDY SITE: ABNORMAL
BENZODIAZ UR QL SCN: NEGATIVE
BILIRUB SERPL-MCNC: 0.5 MG/DL (ref 0.2–1.3)
BILIRUB UR QL STRIP: NEGATIVE
BUN BLD-MCNC: 22 MG/DL (ref 7–20)
BUN/CREAT SERPL: 24.4 (ref 7.1–23.5)
BUPRENORPHINE SERPL-MCNC: NEGATIVE NG/ML
CALCIUM SPEC-SCNC: 8.8 MG/DL (ref 8.4–10.2)
CANNABINOIDS SERPL QL: NEGATIVE
CHLORIDE SERPL-SCNC: 82 MMOL/L (ref 98–107)
CLARITY UR: ABNORMAL
CO2 SERPL-SCNC: 26 MMOL/L (ref 26–30)
COCAINE UR QL: NEGATIVE
COHGB MFR BLD: 2.3 % (ref 0–2)
COLOR UR: YELLOW
CREAT BLD-MCNC: 0.9 MG/DL (ref 0.6–1.3)
D-LACTATE SERPL-SCNC: 1.9 MMOL/L (ref 0.5–2)
DEPRECATED RDW RBC AUTO: 41.1 FL (ref 37–54)
ERYTHROCYTE [DISTWIDTH] IN BLOOD BY AUTOMATED COUNT: 11.6 % (ref 11.5–14.5)
FLUAV AG NPH QL: NEGATIVE
FLUBV AG NPH QL IA: NEGATIVE
GAS FLOW AIRWAY: 15 LPM
GFR SERPL CREATININE-BSD FRML MDRD: 65 ML/MIN/1.73
GLOBULIN UR ELPH-MCNC: 3.5 GM/DL
GLUCOSE BLD-MCNC: 124 MG/DL (ref 74–98)
GLUCOSE BLDC GLUCOMTR-MCNC: 123 MG/DL (ref 70–130)
GLUCOSE UR STRIP-MCNC: NEGATIVE MG/DL
HCO3 BLDA-SCNC: 24.8 MMOL/L (ref 22–28)
HCT VFR BLD AUTO: 35.4 % (ref 37–47)
HCT VFR BLD CALC: 36.6 %
HGB BLD-MCNC: 12.5 G/DL (ref 12–16)
HGB BLDA-MCNC: 11.9 G/DL (ref 12–18)
HGB UR QL STRIP.AUTO: ABNORMAL
HOLD SPECIMEN: NORMAL
HOLD SPECIMEN: NORMAL
HOROWITZ INDEX BLD+IHG-RTO: 100 %
HYALINE CASTS UR QL AUTO: ABNORMAL /LPF
KETONES UR QL STRIP: NEGATIVE
LEUKOCYTE ESTERASE UR QL STRIP.AUTO: NEGATIVE
LYMPHOCYTES # BLD MANUAL: 1.69 10*3/MM3 (ref 0.6–3.4)
LYMPHOCYTES NFR BLD MANUAL: 4 % (ref 0–12)
LYMPHOCYTES NFR BLD MANUAL: 8 % (ref 10–50)
MAGNESIUM SERPL-MCNC: 1.8 MG/DL (ref 1.6–2.3)
MCH RBC QN AUTO: 34.2 PG (ref 27–31)
MCHC RBC AUTO-ENTMCNC: 35.3 G/DL (ref 30–37)
MCV RBC AUTO: 96.7 FL (ref 81–99)
METHADONE UR QL SCN: NEGATIVE
METHGB BLD QL: 0.9 % (ref 0–1.5)
MODALITY: ABNORMAL
MONOCYTES # BLD AUTO: 0.85 10*3/MM3 (ref 0–0.9)
NEUTROPHILS # BLD AUTO: 18.63 10*3/MM3 (ref 2–6.9)
NEUTROPHILS NFR BLD MANUAL: 80 % (ref 37–80)
NEUTS BAND NFR BLD MANUAL: 8 % (ref 0–6)
NITRITE UR QL STRIP: NEGATIVE
NOTE: ABNORMAL
NT-PROBNP SERPL-MCNC: 4330 PG/ML (ref 0–125)
OPIATES UR QL: NEGATIVE
OXYCODONE UR QL SCN: POSITIVE
OXYHGB MFR BLDV: 89.3 % (ref 94–99)
PCO2 BLDA: 42.8 MM HG (ref 35–45)
PCO2 TEMP ADJ BLD: ABNORMAL MM HG (ref 35–45)
PCP UR QL SCN: NEGATIVE
PH BLDA: 7.37 PH UNITS (ref 7.3–7.5)
PH UR STRIP.AUTO: 6 [PH] (ref 5–8)
PH, TEMP CORRECTED: ABNORMAL PH UNITS
PLATELET # BLD AUTO: 312 10*3/MM3 (ref 130–400)
PMV BLD AUTO: 9.6 FL (ref 6–12)
PO2 BLDA: 67.2 MM HG (ref 75–100)
PO2 TEMP ADJ BLD: ABNORMAL MM HG (ref 83–108)
POTASSIUM BLD-SCNC: 3.6 MMOL/L (ref 3.5–5.1)
PROPOXYPH UR QL: NEGATIVE
PROT SERPL-MCNC: 7 G/DL (ref 6.3–8.2)
PROT UR QL STRIP: NEGATIVE
RBC # BLD AUTO: 3.66 10*6/MM3 (ref 4.2–5.4)
RBC # UR: ABNORMAL /HPF
RBC MORPH BLD: NORMAL
REF LAB TEST METHOD: ABNORMAL
RENAL EPI CELLS #/AREA URNS HPF: ABNORMAL /HPF
SAO2 % BLDCOA: 92.3 % (ref 94–100)
SCAN SLIDE: NORMAL
SMALL PLATELETS BLD QL SMEAR: ADEQUATE
SODIUM BLD-SCNC: 118 MMOL/L (ref 137–145)
SP GR UR STRIP: 1.01 (ref 1–1.03)
SQUAMOUS #/AREA URNS HPF: ABNORMAL /HPF
TOXIC GRANULATION: ABNORMAL
TRANS CELLS #/AREA URNS HPF: ABNORMAL /HPF
TRICYCLICS UR QL SCN: NEGATIVE
TROPONIN I SERPL-MCNC: <0.012 NG/ML (ref 0–0.03)
UROBILINOGEN UR QL STRIP: ABNORMAL
VENTILATOR MODE: ABNORMAL
WBC NRBC COR # BLD: 21.17 10*3/MM3 (ref 4.8–10.8)
WBC UR QL AUTO: ABNORMAL /HPF
WHOLE BLOOD HOLD SPECIMEN: NORMAL
WHOLE BLOOD HOLD SPECIMEN: NORMAL

## 2018-12-24 PROCEDURE — 82805 BLOOD GASES W/O2 SATURATION: CPT

## 2018-12-24 PROCEDURE — 25010000002 IOPAMIDOL 61 % SOLUTION: Performed by: EMERGENCY MEDICINE

## 2018-12-24 PROCEDURE — 84484 ASSAY OF TROPONIN QUANT: CPT | Performed by: EMERGENCY MEDICINE

## 2018-12-24 PROCEDURE — 83050 HGB METHEMOGLOBIN QUAN: CPT

## 2018-12-24 PROCEDURE — 83735 ASSAY OF MAGNESIUM: CPT | Performed by: EMERGENCY MEDICINE

## 2018-12-24 PROCEDURE — 25010000002 VANCOMYCIN PER 500 MG: Performed by: EMERGENCY MEDICINE

## 2018-12-24 PROCEDURE — 84145 PROCALCITONIN (PCT): CPT | Performed by: HOSPITALIST

## 2018-12-24 PROCEDURE — 82962 GLUCOSE BLOOD TEST: CPT

## 2018-12-24 PROCEDURE — 93005 ELECTROCARDIOGRAM TRACING: CPT | Performed by: EMERGENCY MEDICINE

## 2018-12-24 PROCEDURE — 83605 ASSAY OF LACTIC ACID: CPT | Performed by: EMERGENCY MEDICINE

## 2018-12-24 PROCEDURE — 25010000002 AZITHROMYCIN: Performed by: EMERGENCY MEDICINE

## 2018-12-24 PROCEDURE — 71045 X-RAY EXAM CHEST 1 VIEW: CPT

## 2018-12-24 PROCEDURE — 83880 ASSAY OF NATRIURETIC PEPTIDE: CPT | Performed by: EMERGENCY MEDICINE

## 2018-12-24 PROCEDURE — 80053 COMPREHEN METABOLIC PANEL: CPT | Performed by: EMERGENCY MEDICINE

## 2018-12-24 PROCEDURE — 36600 WITHDRAWAL OF ARTERIAL BLOOD: CPT

## 2018-12-24 PROCEDURE — 94799 UNLISTED PULMONARY SVC/PX: CPT

## 2018-12-24 PROCEDURE — 85007 BL SMEAR W/DIFF WBC COUNT: CPT | Performed by: EMERGENCY MEDICINE

## 2018-12-24 PROCEDURE — 99285 EMERGENCY DEPT VISIT HI MDM: CPT

## 2018-12-24 PROCEDURE — 82570 ASSAY OF URINE CREATININE: CPT | Performed by: INTERNAL MEDICINE

## 2018-12-24 PROCEDURE — 80306 DRUG TEST PRSMV INSTRMNT: CPT | Performed by: EMERGENCY MEDICINE

## 2018-12-24 PROCEDURE — 71275 CT ANGIOGRAPHY CHEST: CPT

## 2018-12-24 PROCEDURE — 81001 URINALYSIS AUTO W/SCOPE: CPT | Performed by: EMERGENCY MEDICINE

## 2018-12-24 PROCEDURE — 87804 INFLUENZA ASSAY W/OPTIC: CPT | Performed by: EMERGENCY MEDICINE

## 2018-12-24 PROCEDURE — 82375 ASSAY CARBOXYHB QUANT: CPT

## 2018-12-24 PROCEDURE — 25010000002 PIPERACILLIN SOD-TAZOBACTAM PER 1 G: Performed by: EMERGENCY MEDICINE

## 2018-12-24 PROCEDURE — 85025 COMPLETE CBC W/AUTO DIFF WBC: CPT | Performed by: EMERGENCY MEDICINE

## 2018-12-24 PROCEDURE — 84300 ASSAY OF URINE SODIUM: CPT | Performed by: INTERNAL MEDICINE

## 2018-12-24 RX ORDER — IPRATROPIUM BROMIDE AND ALBUTEROL SULFATE 2.5; .5 MG/3ML; MG/3ML
3 SOLUTION RESPIRATORY (INHALATION) ONCE
Status: COMPLETED | OUTPATIENT
Start: 2018-12-24 | End: 2018-12-24

## 2018-12-24 RX ORDER — SODIUM CHLORIDE 0.9 % (FLUSH) 0.9 %
10 SYRINGE (ML) INJECTION AS NEEDED
Status: DISCONTINUED | OUTPATIENT
Start: 2018-12-24 | End: 2018-12-28 | Stop reason: HOSPADM

## 2018-12-24 RX ORDER — OSELTAMIVIR PHOSPHATE 75 MG/1
75 CAPSULE ORAL ONCE
Status: COMPLETED | OUTPATIENT
Start: 2018-12-24 | End: 2018-12-24

## 2018-12-24 RX ORDER — NICOTINE 21 MG/24HR
1 PATCH, TRANSDERMAL 24 HOURS TRANSDERMAL
Status: DISCONTINUED | OUTPATIENT
Start: 2018-12-25 | End: 2018-12-24

## 2018-12-24 RX ORDER — NICOTINE 21 MG/24HR
1 PATCH, TRANSDERMAL 24 HOURS TRANSDERMAL
Status: DISCONTINUED | OUTPATIENT
Start: 2018-12-24 | End: 2018-12-28 | Stop reason: HOSPADM

## 2018-12-24 RX ADMIN — SODIUM CHLORIDE 1000 ML: 9 INJECTION, SOLUTION INTRAVENOUS at 21:27

## 2018-12-24 RX ADMIN — SODIUM CHLORIDE 500 ML: 9 INJECTION, SOLUTION INTRAVENOUS at 20:50

## 2018-12-24 RX ADMIN — VANCOMYCIN HYDROCHLORIDE 750 MG: 750 INJECTION, SOLUTION INTRAVENOUS at 21:20

## 2018-12-24 RX ADMIN — NICOTINE 1 PATCH: 14 PATCH TRANSDERMAL at 23:33

## 2018-12-24 RX ADMIN — IPRATROPIUM BROMIDE AND ALBUTEROL SULFATE 3 ML: .5; 3 SOLUTION RESPIRATORY (INHALATION) at 21:29

## 2018-12-24 RX ADMIN — TAZOBACTAM SODIUM AND PIPERACILLIN SODIUM 3.38 G: 375; 3 INJECTION, SOLUTION INTRAVENOUS at 21:09

## 2018-12-24 RX ADMIN — IOPAMIDOL 100 ML: 612 INJECTION, SOLUTION INTRAVENOUS at 22:45

## 2018-12-24 RX ADMIN — OSELTAMIVIR PHOSPHATE 75 MG: 75 CAPSULE ORAL at 21:10

## 2018-12-24 RX ADMIN — AZITHROMYCIN 500 MG: 500 INJECTION, POWDER, LYOPHILIZED, FOR SOLUTION INTRAVENOUS at 21:26

## 2018-12-25 PROBLEM — M54.50 CHRONIC LOW BACK PAIN: Status: ACTIVE | Noted: 2018-12-25

## 2018-12-25 PROBLEM — Z72.0 TOBACCO USE: Status: ACTIVE | Noted: 2018-12-25

## 2018-12-25 PROBLEM — H91.90 HARD OF HEARING: Status: ACTIVE | Noted: 2018-12-25

## 2018-12-25 PROBLEM — G89.29 CHRONIC LOW BACK PAIN: Status: ACTIVE | Noted: 2018-12-25

## 2018-12-25 PROBLEM — G93.41 ACUTE METABOLIC ENCEPHALOPATHY: Status: ACTIVE | Noted: 2018-12-25

## 2018-12-25 PROBLEM — J96.21 ACUTE ON CHRONIC RESPIRATORY FAILURE WITH HYPOXIA (HCC): Status: ACTIVE | Noted: 2017-05-01

## 2018-12-25 LAB
A-A DO2: ABNORMAL MMHG
A-A DO2: ABNORMAL MMHG
ANION GAP SERPL CALCULATED.3IONS-SCNC: 11.5 MMOL/L (ref 10–20)
ARTERIAL PATENCY WRIST A: POSITIVE
ARTERIAL PATENCY WRIST A: POSITIVE
ATMOSPHERIC PRESS: 740 MMHG
ATMOSPHERIC PRESS: 740 MMHG
BASE EXCESS BLDA CALC-SCNC: -0.2 MMOL/L (ref 0–2)
BASE EXCESS BLDA CALC-SCNC: 2.5 MMOL/L (ref 0–2)
BDY SITE: ABNORMAL
BDY SITE: ABNORMAL
BUN BLD-MCNC: 13 MG/DL (ref 7–20)
BUN/CREAT SERPL: 26 (ref 7.1–23.5)
CALCIUM SPEC-SCNC: 7.3 MG/DL (ref 8.4–10.2)
CHLORIDE SERPL-SCNC: 91 MMOL/L (ref 98–107)
CO2 SERPL-SCNC: 25 MMOL/L (ref 26–30)
COHGB MFR BLD: 1.4 % (ref 0–2)
COHGB MFR BLD: 1.8 % (ref 0–2)
CREAT BLD-MCNC: 0.5 MG/DL (ref 0.6–1.3)
CREAT UR-MCNC: 34.2 MG/DL
CRP SERPL-MCNC: 26.6 MG/DL (ref 0–1)
DEPRECATED RDW RBC AUTO: 38.9 FL (ref 37–54)
ERYTHROCYTE [DISTWIDTH] IN BLOOD BY AUTOMATED COUNT: 11.4 % (ref 11.5–14.5)
ERYTHROCYTE [SEDIMENTATION RATE] IN BLOOD: 102 MM/HR (ref 0–20)
GAS FLOW AIRWAY: 15 LPM
GAS FLOW AIRWAY: 15 LPM
GFR SERPL CREATININE-BSD FRML MDRD: 127 ML/MIN/1.73
GLUCOSE BLD-MCNC: 159 MG/DL (ref 74–98)
HCO3 BLDA-SCNC: 26.1 MMOL/L (ref 22–28)
HCO3 BLDA-SCNC: 28 MMOL/L (ref 22–28)
HCT VFR BLD AUTO: 31 % (ref 37–47)
HCT VFR BLD CALC: 34.5 %
HCT VFR BLD CALC: 35.2 %
HGB BLD-MCNC: 11.2 G/DL (ref 12–16)
HGB BLDA-MCNC: 11.3 G/DL (ref 12–18)
HGB BLDA-MCNC: 11.5 G/DL (ref 12–18)
HOROWITZ INDEX BLD+IHG-RTO: 100 %
HOROWITZ INDEX BLD+IHG-RTO: 100 %
LYMPHOCYTES # BLD MANUAL: 1.03 10*3/MM3 (ref 0.6–3.4)
LYMPHOCYTES NFR BLD MANUAL: 5 % (ref 0–12)
LYMPHOCYTES NFR BLD MANUAL: 7 % (ref 10–50)
MAGNESIUM SERPL-MCNC: 1.4 MG/DL (ref 1.6–2.3)
MCH RBC QN AUTO: 33.9 PG (ref 27–31)
MCHC RBC AUTO-ENTMCNC: 36.1 G/DL (ref 30–37)
MCV RBC AUTO: 93.9 FL (ref 81–99)
METHGB BLD QL: 0.6 % (ref 0–1.5)
METHGB BLD QL: 0.6 % (ref 0–1.5)
MODALITY: ABNORMAL
MODALITY: ABNORMAL
MONOCYTES # BLD AUTO: 0.73 10*3/MM3 (ref 0–0.9)
NEUTROPHILS # BLD AUTO: 12.93 10*3/MM3 (ref 2–6.9)
NEUTROPHILS NFR BLD MANUAL: 77 % (ref 37–80)
NEUTS BAND NFR BLD MANUAL: 11 % (ref 0–6)
NOTE: ABNORMAL
NOTE: ABNORMAL
OXYHGB MFR BLDV: 83.4 % (ref 94–99)
OXYHGB MFR BLDV: 96.9 % (ref 94–99)
PCO2 BLDA: 46.4 MM HG (ref 35–45)
PCO2 BLDA: 49.3 MM HG (ref 35–45)
PCO2 TEMP ADJ BLD: ABNORMAL MM HG (ref 35–45)
PCO2 TEMP ADJ BLD: ABNORMAL MM HG (ref 35–45)
PH BLDA: 7.33 PH UNITS (ref 7.3–7.5)
PH BLDA: 7.39 PH UNITS (ref 7.3–7.5)
PH, TEMP CORRECTED: ABNORMAL PH UNITS
PH, TEMP CORRECTED: ABNORMAL PH UNITS
PLATELET # BLD AUTO: 263 10*3/MM3 (ref 130–400)
PMV BLD AUTO: 10.1 FL (ref 6–12)
PO2 BLDA: 111 MM HG (ref 75–100)
PO2 BLDA: 55.1 MM HG (ref 75–100)
PO2 TEMP ADJ BLD: ABNORMAL MM HG (ref 83–108)
PO2 TEMP ADJ BLD: ABNORMAL MM HG (ref 83–108)
POTASSIUM BLD-SCNC: 2.5 MMOL/L (ref 3.5–5.1)
POTASSIUM BLD-SCNC: 2.6 MMOL/L (ref 3.5–5.1)
PROCALCITONIN SERPL-MCNC: 7.34 NG/ML
PROCALCITONIN SERPL-MCNC: 8.84 NG/ML
RBC # BLD AUTO: 3.3 10*6/MM3 (ref 4.2–5.4)
RBC MORPH BLD: NORMAL
SAO2 % BLDCOA: 85.4 % (ref 94–100)
SAO2 % BLDCOA: 98.9 % (ref 94–100)
SCAN SLIDE: NORMAL
SMALL PLATELETS BLD QL SMEAR: ADEQUATE
SODIUM BLD-SCNC: 125 MMOL/L (ref 137–145)
SODIUM BLD-SCNC: 126 MMOL/L (ref 137–145)
SODIUM BLD-SCNC: 128 MMOL/L (ref 137–145)
SODIUM UR-SCNC: 12 MMOL/L (ref 30–90)
TOXIC GRANULATION: ABNORMAL
URATE SERPL-MCNC: 4.6 MG/DL (ref 2.5–8.5)
VENTILATOR MODE: ABNORMAL
VENTILATOR MODE: ABNORMAL
WBC NRBC COR # BLD: 14.69 10*3/MM3 (ref 4.8–10.8)

## 2018-12-25 PROCEDURE — 86140 C-REACTIVE PROTEIN: CPT | Performed by: INTERNAL MEDICINE

## 2018-12-25 PROCEDURE — 82375 ASSAY CARBOXYHB QUANT: CPT

## 2018-12-25 PROCEDURE — 84295 ASSAY OF SERUM SODIUM: CPT | Performed by: HOSPITALIST

## 2018-12-25 PROCEDURE — 86671 FUNGUS NES ANTIBODY: CPT | Performed by: INTERNAL MEDICINE

## 2018-12-25 PROCEDURE — 86609 BACTERIUM ANTIBODY: CPT | Performed by: INTERNAL MEDICINE

## 2018-12-25 PROCEDURE — 25010000002 VANCOMYCIN PER 500 MG: Performed by: HOSPITALIST

## 2018-12-25 PROCEDURE — 87081 CULTURE SCREEN ONLY: CPT | Performed by: HOSPITALIST

## 2018-12-25 PROCEDURE — 99223 1ST HOSP IP/OBS HIGH 75: CPT | Performed by: HOSPITALIST

## 2018-12-25 PROCEDURE — 83050 HGB METHEMOGLOBIN QUAN: CPT

## 2018-12-25 PROCEDURE — 36600 WITHDRAWAL OF ARTERIAL BLOOD: CPT

## 2018-12-25 PROCEDURE — 25010000002 PIPERACILLIN SOD-TAZOBACTAM PER 1 G: Performed by: HOSPITALIST

## 2018-12-25 PROCEDURE — 86602 ANTINOMYCES ANTIBODY: CPT | Performed by: INTERNAL MEDICINE

## 2018-12-25 PROCEDURE — 87633 RESP VIRUS 12-25 TARGETS: CPT | Performed by: HOSPITALIST

## 2018-12-25 PROCEDURE — 25010000002 LEVOFLOXACIN PER 250 MG: Performed by: HOSPITALIST

## 2018-12-25 PROCEDURE — 94640 AIRWAY INHALATION TREATMENT: CPT

## 2018-12-25 PROCEDURE — 25010000002 FUROSEMIDE PER 20 MG: Performed by: HOSPITALIST

## 2018-12-25 PROCEDURE — 80048 BASIC METABOLIC PNL TOTAL CA: CPT | Performed by: HOSPITALIST

## 2018-12-25 PROCEDURE — 87205 SMEAR GRAM STAIN: CPT | Performed by: INTERNAL MEDICINE

## 2018-12-25 PROCEDURE — 84550 ASSAY OF BLOOD/URIC ACID: CPT | Performed by: INTERNAL MEDICINE

## 2018-12-25 PROCEDURE — 82805 BLOOD GASES W/O2 SATURATION: CPT

## 2018-12-25 PROCEDURE — 87449 NOS EACH ORGANISM AG IA: CPT | Performed by: HOSPITALIST

## 2018-12-25 PROCEDURE — 87899 AGENT NOS ASSAY W/OPTIC: CPT | Performed by: HOSPITALIST

## 2018-12-25 PROCEDURE — 25010000002 METHYLPREDNISOLONE PER 125 MG: Performed by: INTERNAL MEDICINE

## 2018-12-25 PROCEDURE — 25010000002 HEPARIN (PORCINE) PER 1000 UNITS: Performed by: HOSPITALIST

## 2018-12-25 PROCEDURE — 84132 ASSAY OF SERUM POTASSIUM: CPT | Performed by: HOSPITALIST

## 2018-12-25 PROCEDURE — 87106 FUNGI IDENTIFICATION YEAST: CPT | Performed by: INTERNAL MEDICINE

## 2018-12-25 PROCEDURE — 83735 ASSAY OF MAGNESIUM: CPT | Performed by: HOSPITALIST

## 2018-12-25 PROCEDURE — 87486 CHLMYD PNEUM DNA AMP PROBE: CPT | Performed by: HOSPITALIST

## 2018-12-25 PROCEDURE — 85651 RBC SED RATE NONAUTOMATED: CPT | Performed by: INTERNAL MEDICINE

## 2018-12-25 PROCEDURE — 84145 PROCALCITONIN (PCT): CPT | Performed by: HOSPITALIST

## 2018-12-25 PROCEDURE — 94799 UNLISTED PULMONARY SVC/PX: CPT

## 2018-12-25 PROCEDURE — 86331 IMMUNODIFFUSION OUCHTERLONY: CPT | Performed by: INTERNAL MEDICINE

## 2018-12-25 PROCEDURE — 85025 COMPLETE CBC W/AUTO DIFF WBC: CPT | Performed by: HOSPITALIST

## 2018-12-25 PROCEDURE — 87070 CULTURE OTHR SPECIMN AEROBIC: CPT | Performed by: INTERNAL MEDICINE

## 2018-12-25 PROCEDURE — 86606 ASPERGILLUS ANTIBODY: CPT | Performed by: INTERNAL MEDICINE

## 2018-12-25 PROCEDURE — 99223 1ST HOSP IP/OBS HIGH 75: CPT | Performed by: INTERNAL MEDICINE

## 2018-12-25 PROCEDURE — 87581 M.PNEUMON DNA AMP PROBE: CPT | Performed by: HOSPITALIST

## 2018-12-25 PROCEDURE — 87798 DETECT AGENT NOS DNA AMP: CPT | Performed by: HOSPITALIST

## 2018-12-25 RX ORDER — DOCUSATE SODIUM 100 MG/1
100 CAPSULE, LIQUID FILLED ORAL DAILY PRN
Status: DISCONTINUED | OUTPATIENT
Start: 2018-12-25 | End: 2018-12-28 | Stop reason: HOSPADM

## 2018-12-25 RX ORDER — ONDANSETRON 2 MG/ML
4 INJECTION INTRAMUSCULAR; INTRAVENOUS EVERY 6 HOURS PRN
Status: DISCONTINUED | OUTPATIENT
Start: 2018-12-25 | End: 2018-12-28 | Stop reason: HOSPADM

## 2018-12-25 RX ORDER — METHYLPREDNISOLONE SODIUM SUCCINATE 125 MG/2ML
60 INJECTION, POWDER, LYOPHILIZED, FOR SOLUTION INTRAMUSCULAR; INTRAVENOUS EVERY 8 HOURS
Status: DISCONTINUED | OUTPATIENT
Start: 2018-12-25 | End: 2018-12-27

## 2018-12-25 RX ORDER — METHOCARBAMOL 500 MG/1
500 TABLET, FILM COATED ORAL 3 TIMES DAILY PRN
Status: DISCONTINUED | OUTPATIENT
Start: 2018-12-25 | End: 2018-12-28 | Stop reason: HOSPADM

## 2018-12-25 RX ORDER — SODIUM CHLORIDE FOR INHALATION 3 %
4 VIAL, NEBULIZER (ML) INHALATION ONCE
Status: DISCONTINUED | OUTPATIENT
Start: 2018-12-25 | End: 2018-12-25

## 2018-12-25 RX ORDER — SODIUM CHLORIDE 9 MG/ML
75 INJECTION, SOLUTION INTRAVENOUS CONTINUOUS
Status: DISCONTINUED | OUTPATIENT
Start: 2018-12-25 | End: 2018-12-25

## 2018-12-25 RX ORDER — OXYCODONE AND ACETAMINOPHEN 10; 325 MG/1; MG/1
1 TABLET ORAL EVERY 6 HOURS PRN
Status: DISCONTINUED | OUTPATIENT
Start: 2018-12-25 | End: 2018-12-28 | Stop reason: HOSPADM

## 2018-12-25 RX ORDER — SODIUM CHLORIDE 0.9 % (FLUSH) 0.9 %
3 SYRINGE (ML) INJECTION EVERY 12 HOURS SCHEDULED
Status: DISCONTINUED | OUTPATIENT
Start: 2018-12-25 | End: 2018-12-28 | Stop reason: HOSPADM

## 2018-12-25 RX ORDER — BUDESONIDE 0.5 MG/2ML
1 INHALANT ORAL
Status: DISCONTINUED | OUTPATIENT
Start: 2018-12-25 | End: 2018-12-28 | Stop reason: HOSPADM

## 2018-12-25 RX ORDER — ACETAMINOPHEN 325 MG/1
650 TABLET ORAL EVERY 4 HOURS PRN
Status: DISCONTINUED | OUTPATIENT
Start: 2018-12-25 | End: 2018-12-28 | Stop reason: HOSPADM

## 2018-12-25 RX ORDER — POTASSIUM CHLORIDE 750 MG/1
40 CAPSULE, EXTENDED RELEASE ORAL 2 TIMES DAILY
Status: COMPLETED | OUTPATIENT
Start: 2018-12-25 | End: 2018-12-26

## 2018-12-25 RX ORDER — LEVOFLOXACIN 5 MG/ML
500 INJECTION, SOLUTION INTRAVENOUS EVERY 24 HOURS
Status: DISCONTINUED | OUTPATIENT
Start: 2018-12-25 | End: 2018-12-28

## 2018-12-25 RX ORDER — AMLODIPINE BESYLATE 5 MG/1
10 TABLET ORAL DAILY
Status: DISCONTINUED | OUTPATIENT
Start: 2018-12-25 | End: 2018-12-28

## 2018-12-25 RX ORDER — WHITE PETROLATUM 450 MG/G
1 STICK TOPICAL
Status: DISCONTINUED | OUTPATIENT
Start: 2018-12-25 | End: 2018-12-28 | Stop reason: HOSPADM

## 2018-12-25 RX ORDER — MELOXICAM 7.5 MG/1
15 TABLET ORAL DAILY
Status: DISCONTINUED | OUTPATIENT
Start: 2018-12-25 | End: 2018-12-25

## 2018-12-25 RX ORDER — BUDESONIDE 0.5 MG/2ML
0.5 INHALANT ORAL
Status: DISCONTINUED | OUTPATIENT
Start: 2018-12-25 | End: 2018-12-25

## 2018-12-25 RX ORDER — TRAMADOL HYDROCHLORIDE 50 MG/1
50 TABLET ORAL 3 TIMES DAILY
Status: DISCONTINUED | OUTPATIENT
Start: 2018-12-25 | End: 2018-12-28 | Stop reason: HOSPADM

## 2018-12-25 RX ORDER — FUROSEMIDE 10 MG/ML
20 INJECTION INTRAMUSCULAR; INTRAVENOUS ONCE
Status: COMPLETED | OUTPATIENT
Start: 2018-12-25 | End: 2018-12-25

## 2018-12-25 RX ORDER — HEPARIN SODIUM 5000 [USP'U]/ML
5000 INJECTION, SOLUTION INTRAVENOUS; SUBCUTANEOUS EVERY 12 HOURS SCHEDULED
Status: DISCONTINUED | OUTPATIENT
Start: 2018-12-25 | End: 2018-12-28 | Stop reason: HOSPADM

## 2018-12-25 RX ORDER — SODIUM CHLORIDE 0.9 % (FLUSH) 0.9 %
1-10 SYRINGE (ML) INJECTION AS NEEDED
Status: DISCONTINUED | OUTPATIENT
Start: 2018-12-25 | End: 2018-12-28 | Stop reason: HOSPADM

## 2018-12-25 RX ORDER — GABAPENTIN 300 MG/1
600 CAPSULE ORAL EVERY 6 HOURS
Status: DISCONTINUED | OUTPATIENT
Start: 2018-12-25 | End: 2018-12-28 | Stop reason: HOSPADM

## 2018-12-25 RX ADMIN — SODIUM CHLORIDE, PRESERVATIVE FREE 3 ML: 5 INJECTION INTRAVENOUS at 09:33

## 2018-12-25 RX ADMIN — AMLODIPINE BESYLATE 10 MG: 5 TABLET ORAL at 09:30

## 2018-12-25 RX ADMIN — VANCOMYCIN HYDROCHLORIDE 750 MG: 750 INJECTION, SOLUTION INTRAVENOUS at 20:25

## 2018-12-25 RX ADMIN — BUDESONIDE 1 MG: 0.5 INHALANT RESPIRATORY (INHALATION) at 20:12

## 2018-12-25 RX ADMIN — OXYCODONE HYDROCHLORIDE AND ACETAMINOPHEN 1 TABLET: 10; 325 TABLET ORAL at 16:06

## 2018-12-25 RX ADMIN — HEPARIN SODIUM 5000 UNITS: 5000 INJECTION, SOLUTION INTRAVENOUS; SUBCUTANEOUS at 09:29

## 2018-12-25 RX ADMIN — TRAMADOL HYDROCHLORIDE 50 MG: 50 TABLET, FILM COATED ORAL at 20:24

## 2018-12-25 RX ADMIN — HEPARIN SODIUM 5000 UNITS: 5000 INJECTION, SOLUTION INTRAVENOUS; SUBCUTANEOUS at 03:31

## 2018-12-25 RX ADMIN — METHYLPREDNISOLONE SODIUM SUCCINATE 60 MG: 125 INJECTION, POWDER, FOR SOLUTION INTRAMUSCULAR; INTRAVENOUS at 16:06

## 2018-12-25 RX ADMIN — TRAMADOL HYDROCHLORIDE 50 MG: 50 TABLET, FILM COATED ORAL at 16:06

## 2018-12-25 RX ADMIN — SODIUM CHLORIDE, PRESERVATIVE FREE 3 ML: 5 INJECTION INTRAVENOUS at 20:25

## 2018-12-25 RX ADMIN — POTASSIUM CHLORIDE 40 MEQ: 750 CAPSULE, EXTENDED RELEASE ORAL at 09:29

## 2018-12-25 RX ADMIN — METHYLPREDNISOLONE SODIUM SUCCINATE 60 MG: 125 INJECTION, POWDER, FOR SOLUTION INTRAMUSCULAR; INTRAVENOUS at 23:21

## 2018-12-25 RX ADMIN — FUROSEMIDE 20 MG: 10 INJECTION, SOLUTION INTRAMUSCULAR; INTRAVENOUS at 01:45

## 2018-12-25 RX ADMIN — LEVOFLOXACIN 500 MG: 5 INJECTION, SOLUTION INTRAVENOUS at 03:32

## 2018-12-25 RX ADMIN — TRAMADOL HYDROCHLORIDE 50 MG: 50 TABLET, FILM COATED ORAL at 09:29

## 2018-12-25 RX ADMIN — GABAPENTIN 600 MG: 300 CAPSULE ORAL at 13:44

## 2018-12-25 RX ADMIN — TAZOBACTAM SODIUM AND PIPERACILLIN SODIUM 3.38 G: 375; 3 INJECTION, SOLUTION INTRAVENOUS at 03:31

## 2018-12-25 RX ADMIN — OXYCODONE HYDROCHLORIDE AND ACETAMINOPHEN 1 TABLET: 10; 325 TABLET ORAL at 09:28

## 2018-12-25 RX ADMIN — GABAPENTIN 600 MG: 300 CAPSULE ORAL at 03:32

## 2018-12-25 RX ADMIN — POTASSIUM CHLORIDE 40 MEQ: 750 CAPSULE, EXTENDED RELEASE ORAL at 20:24

## 2018-12-25 RX ADMIN — TAZOBACTAM SODIUM AND PIPERACILLIN SODIUM 3.38 G: 375; 3 INJECTION, SOLUTION INTRAVENOUS at 11:43

## 2018-12-25 RX ADMIN — OXYCODONE HYDROCHLORIDE AND ACETAMINOPHEN 1 TABLET: 10; 325 TABLET ORAL at 03:32

## 2018-12-25 RX ADMIN — SODIUM CHLORIDE 75 ML/HR: 9 INJECTION, SOLUTION INTRAVENOUS at 01:46

## 2018-12-25 RX ADMIN — HEPARIN SODIUM 5000 UNITS: 5000 INJECTION, SOLUTION INTRAVENOUS; SUBCUTANEOUS at 20:24

## 2018-12-25 RX ADMIN — SODIUM CHLORIDE, PRESERVATIVE FREE 3 ML: 5 INJECTION INTRAVENOUS at 04:37

## 2018-12-25 RX ADMIN — GABAPENTIN 600 MG: 300 CAPSULE ORAL at 19:41

## 2018-12-25 RX ADMIN — TAZOBACTAM SODIUM AND PIPERACILLIN SODIUM 3.38 G: 375; 3 INJECTION, SOLUTION INTRAVENOUS at 19:03

## 2018-12-25 RX ADMIN — GABAPENTIN 600 MG: 300 CAPSULE ORAL at 09:29

## 2018-12-25 RX ADMIN — OXYCODONE HYDROCHLORIDE AND ACETAMINOPHEN 1 TABLET: 10; 325 TABLET ORAL at 22:00

## 2018-12-26 ENCOUNTER — APPOINTMENT (OUTPATIENT)
Dept: GENERAL RADIOLOGY | Facility: HOSPITAL | Age: 58
End: 2018-12-26

## 2018-12-26 LAB
A-A DO2: ABNORMAL MMHG
ANION GAP SERPL CALCULATED.3IONS-SCNC: 9.7 MMOL/L (ref 10–20)
ARTERIAL PATENCY WRIST A: ABNORMAL
ATMOSPHERIC PRESS: 744 MMHG
BASE EXCESS BLDA CALC-SCNC: 5.5 MMOL/L (ref 0–2)
BDY SITE: ABNORMAL
BUN BLD-MCNC: 6 MG/DL (ref 7–20)
BUN/CREAT SERPL: 20 (ref 7.1–23.5)
CALCIUM SPEC-SCNC: 7.9 MG/DL (ref 8.4–10.2)
CHLORIDE SERPL-SCNC: 92 MMOL/L (ref 98–107)
CO2 SERPL-SCNC: 29 MMOL/L (ref 26–30)
COHGB MFR BLD: 1.1 % (ref 0–2)
CREAT BLD-MCNC: 0.3 MG/DL (ref 0.6–1.3)
DEPRECATED RDW RBC AUTO: 43.7 FL (ref 37–54)
ERYTHROCYTE [DISTWIDTH] IN BLOOD BY AUTOMATED COUNT: 11.9 % (ref 11.5–14.5)
GAS FLOW AIRWAY: 15 LPM
GFR SERPL CREATININE-BSD FRML MDRD: >150 ML/MIN/1.73
GLUCOSE BLD-MCNC: 150 MG/DL (ref 74–98)
HCO3 BLDA-SCNC: 30.5 MMOL/L (ref 22–28)
HCT VFR BLD AUTO: 35.3 % (ref 37–47)
HCT VFR BLD CALC: 37.3 %
HGB BLD-MCNC: 12.2 G/DL (ref 12–16)
HGB BLDA-MCNC: 12.2 G/DL (ref 12–18)
MAGNESIUM SERPL-MCNC: 1.5 MG/DL (ref 1.6–2.3)
MCH RBC QN AUTO: 34.1 PG (ref 27–31)
MCHC RBC AUTO-ENTMCNC: 34.6 G/DL (ref 30–37)
MCV RBC AUTO: 98.6 FL (ref 81–99)
METHGB BLD QL: 0.5 % (ref 0–1.5)
MODALITY: ABNORMAL
NOTE: ABNORMAL
OXYHGB MFR BLDV: 91.6 % (ref 94–99)
PCO2 BLDA: 45 MM HG (ref 35–45)
PCO2 TEMP ADJ BLD: ABNORMAL MM HG (ref 35–45)
PH BLDA: 7.44 PH UNITS (ref 7.3–7.5)
PH, TEMP CORRECTED: ABNORMAL PH UNITS
PLATELET # BLD AUTO: 259 10*3/MM3 (ref 130–400)
PMV BLD AUTO: 9.5 FL (ref 6–12)
PO2 BLDA: 63.4 MM HG (ref 75–100)
PO2 TEMP ADJ BLD: ABNORMAL MM HG (ref 83–108)
POTASSIUM BLD-SCNC: 3.7 MMOL/L (ref 3.5–5.1)
RBC # BLD AUTO: 3.58 10*6/MM3 (ref 4.2–5.4)
SAO2 % BLDCOA: 93.1 % (ref 94–100)
SODIUM BLD-SCNC: 126 MMOL/L (ref 137–145)
SODIUM BLD-SCNC: 127 MMOL/L (ref 137–145)
SODIUM BLD-SCNC: 128 MMOL/L (ref 137–145)
VENTILATOR MODE: ABNORMAL
WBC NRBC COR # BLD: 8.14 10*3/MM3 (ref 4.8–10.8)

## 2018-12-26 PROCEDURE — 83735 ASSAY OF MAGNESIUM: CPT | Performed by: INTERNAL MEDICINE

## 2018-12-26 PROCEDURE — 25010000002 VANCOMYCIN PER 500 MG: Performed by: HOSPITALIST

## 2018-12-26 PROCEDURE — 97161 PT EVAL LOW COMPLEX 20 MIN: CPT

## 2018-12-26 PROCEDURE — 94799 UNLISTED PULMONARY SVC/PX: CPT

## 2018-12-26 PROCEDURE — 25010000002 HEPARIN (PORCINE) PER 1000 UNITS: Performed by: HOSPITALIST

## 2018-12-26 PROCEDURE — 87081 CULTURE SCREEN ONLY: CPT | Performed by: INTERNAL MEDICINE

## 2018-12-26 PROCEDURE — 97165 OT EVAL LOW COMPLEX 30 MIN: CPT

## 2018-12-26 PROCEDURE — 36600 WITHDRAWAL OF ARTERIAL BLOOD: CPT

## 2018-12-26 PROCEDURE — 80048 BASIC METABOLIC PNL TOTAL CA: CPT | Performed by: INTERNAL MEDICINE

## 2018-12-26 PROCEDURE — 82805 BLOOD GASES W/O2 SATURATION: CPT

## 2018-12-26 PROCEDURE — 99233 SBSQ HOSP IP/OBS HIGH 50: CPT | Performed by: INTERNAL MEDICINE

## 2018-12-26 PROCEDURE — 25010000002 LEVOFLOXACIN PER 250 MG: Performed by: HOSPITALIST

## 2018-12-26 PROCEDURE — 84295 ASSAY OF SERUM SODIUM: CPT | Performed by: HOSPITALIST

## 2018-12-26 PROCEDURE — 25010000002 METHYLPREDNISOLONE PER 125 MG: Performed by: INTERNAL MEDICINE

## 2018-12-26 PROCEDURE — 82375 ASSAY CARBOXYHB QUANT: CPT

## 2018-12-26 PROCEDURE — 85027 COMPLETE CBC AUTOMATED: CPT | Performed by: INTERNAL MEDICINE

## 2018-12-26 PROCEDURE — 25010000002 MAGNESIUM SULFATE 2 GM/50ML SOLUTION: Performed by: INTERNAL MEDICINE

## 2018-12-26 PROCEDURE — 71045 X-RAY EXAM CHEST 1 VIEW: CPT

## 2018-12-26 PROCEDURE — 25010000002 PIPERACILLIN SOD-TAZOBACTAM PER 1 G: Performed by: HOSPITALIST

## 2018-12-26 PROCEDURE — 99232 SBSQ HOSP IP/OBS MODERATE 35: CPT | Performed by: INTERNAL MEDICINE

## 2018-12-26 PROCEDURE — 83050 HGB METHEMOGLOBIN QUAN: CPT

## 2018-12-26 RX ORDER — POTASSIUM CHLORIDE 750 MG/1
10 TABLET, EXTENDED RELEASE ORAL 2 TIMES DAILY
Status: ON HOLD | COMMUNITY
End: 2019-02-13

## 2018-12-26 RX ORDER — MAGNESIUM SULFATE HEPTAHYDRATE 40 MG/ML
2 INJECTION, SOLUTION INTRAVENOUS ONCE
Status: COMPLETED | OUTPATIENT
Start: 2018-12-26 | End: 2018-12-26

## 2018-12-26 RX ADMIN — TAZOBACTAM SODIUM AND PIPERACILLIN SODIUM 3.38 G: 375; 3 INJECTION, SOLUTION INTRAVENOUS at 02:17

## 2018-12-26 RX ADMIN — TAZOBACTAM SODIUM AND PIPERACILLIN SODIUM 3.38 G: 375; 3 INJECTION, SOLUTION INTRAVENOUS at 18:53

## 2018-12-26 RX ADMIN — TRAMADOL HYDROCHLORIDE 50 MG: 50 TABLET, FILM COATED ORAL at 21:15

## 2018-12-26 RX ADMIN — METHOCARBAMOL TABLETS 500 MG: 500 TABLET, COATED ORAL at 12:09

## 2018-12-26 RX ADMIN — NICOTINE 1 PATCH: 14 PATCH TRANSDERMAL at 08:49

## 2018-12-26 RX ADMIN — METHYLPREDNISOLONE SODIUM SUCCINATE 60 MG: 125 INJECTION, POWDER, FOR SOLUTION INTRAMUSCULAR; INTRAVENOUS at 15:20

## 2018-12-26 RX ADMIN — METHOCARBAMOL TABLETS 500 MG: 500 TABLET, COATED ORAL at 21:15

## 2018-12-26 RX ADMIN — GABAPENTIN 600 MG: 300 CAPSULE ORAL at 15:20

## 2018-12-26 RX ADMIN — OXYCODONE HYDROCHLORIDE AND ACETAMINOPHEN 1 TABLET: 10; 325 TABLET ORAL at 15:39

## 2018-12-26 RX ADMIN — VANCOMYCIN HYDROCHLORIDE 750 MG: 750 INJECTION, SOLUTION INTRAVENOUS at 11:59

## 2018-12-26 RX ADMIN — OXYCODONE HYDROCHLORIDE AND ACETAMINOPHEN 1 TABLET: 10; 325 TABLET ORAL at 10:04

## 2018-12-26 RX ADMIN — OXYCODONE HYDROCHLORIDE AND ACETAMINOPHEN 1 TABLET: 10; 325 TABLET ORAL at 21:16

## 2018-12-26 RX ADMIN — TRAMADOL HYDROCHLORIDE 50 MG: 50 TABLET, FILM COATED ORAL at 08:42

## 2018-12-26 RX ADMIN — TAZOBACTAM SODIUM AND PIPERACILLIN SODIUM 3.38 G: 375; 3 INJECTION, SOLUTION INTRAVENOUS at 11:59

## 2018-12-26 RX ADMIN — BUDESONIDE 1 MG: 0.5 INHALANT RESPIRATORY (INHALATION) at 07:21

## 2018-12-26 RX ADMIN — OXYCODONE HYDROCHLORIDE AND ACETAMINOPHEN 1 TABLET: 10; 325 TABLET ORAL at 04:09

## 2018-12-26 RX ADMIN — VANCOMYCIN HYDROCHLORIDE 750 MG: 750 INJECTION, SOLUTION INTRAVENOUS at 22:03

## 2018-12-26 RX ADMIN — GABAPENTIN 600 MG: 300 CAPSULE ORAL at 01:24

## 2018-12-26 RX ADMIN — METHYLPREDNISOLONE SODIUM SUCCINATE 60 MG: 125 INJECTION, POWDER, FOR SOLUTION INTRAMUSCULAR; INTRAVENOUS at 22:48

## 2018-12-26 RX ADMIN — HEPARIN SODIUM 5000 UNITS: 5000 INJECTION, SOLUTION INTRAVENOUS; SUBCUTANEOUS at 21:16

## 2018-12-26 RX ADMIN — AMLODIPINE BESYLATE 10 MG: 5 TABLET ORAL at 08:42

## 2018-12-26 RX ADMIN — GABAPENTIN 600 MG: 300 CAPSULE ORAL at 21:16

## 2018-12-26 RX ADMIN — TRAMADOL HYDROCHLORIDE 50 MG: 50 TABLET, FILM COATED ORAL at 15:19

## 2018-12-26 RX ADMIN — HEPARIN SODIUM 5000 UNITS: 5000 INJECTION, SOLUTION INTRAVENOUS; SUBCUTANEOUS at 08:42

## 2018-12-26 RX ADMIN — ACETAMINOPHEN 650 MG: 325 TABLET, FILM COATED ORAL at 17:00

## 2018-12-26 RX ADMIN — METHYLPREDNISOLONE SODIUM SUCCINATE 60 MG: 125 INJECTION, POWDER, FOR SOLUTION INTRAMUSCULAR; INTRAVENOUS at 06:12

## 2018-12-26 RX ADMIN — POTASSIUM CHLORIDE 40 MEQ: 750 CAPSULE, EXTENDED RELEASE ORAL at 08:42

## 2018-12-26 RX ADMIN — MAGNESIUM SULFATE HEPTAHYDRATE 2 G: 40 INJECTION, SOLUTION INTRAVENOUS at 08:43

## 2018-12-26 RX ADMIN — LEVOFLOXACIN 500 MG: 5 INJECTION, SOLUTION INTRAVENOUS at 01:24

## 2018-12-26 RX ADMIN — GABAPENTIN 600 MG: 300 CAPSULE ORAL at 08:42

## 2018-12-26 RX ADMIN — POTASSIUM CHLORIDE 40 MEQ: 750 CAPSULE, EXTENDED RELEASE ORAL at 21:16

## 2018-12-26 RX ADMIN — SODIUM CHLORIDE, PRESERVATIVE FREE 3 ML: 5 INJECTION INTRAVENOUS at 21:16

## 2018-12-26 RX ADMIN — SODIUM CHLORIDE, PRESERVATIVE FREE 3 ML: 5 INJECTION INTRAVENOUS at 08:46

## 2018-12-27 ENCOUNTER — APPOINTMENT (OUTPATIENT)
Dept: GENERAL RADIOLOGY | Facility: HOSPITAL | Age: 58
End: 2018-12-27

## 2018-12-27 LAB
A-A DO2: ABNORMAL MMHG
ALBUMIN SERPL-MCNC: 3.2 G/DL (ref 3.5–5)
ANION GAP SERPL CALCULATED.3IONS-SCNC: 11.2 MMOL/L (ref 10–20)
ARTERIAL PATENCY WRIST A: POSITIVE
ATMOSPHERIC PRESS: 738 MMHG
B PERT.PT PRMT NPH QL NAA+NON-PROBE: NOT DETECTED
BACTERIA FLD CULT: ABNORMAL
BACTERIA SPEC RESP CULT: ABNORMAL
BASE EXCESS BLDA CALC-SCNC: 9.5 MMOL/L (ref 0–2)
BDY SITE: ABNORMAL
BUN BLD-MCNC: 8 MG/DL (ref 7–20)
BUN/CREAT SERPL: 26.7 (ref 7.1–23.5)
C PNEUM DNA NPH QL NAA+NON-PROBE: NOT DETECTED
CALCIUM SPEC-SCNC: 8.1 MG/DL (ref 8.4–10.2)
CHLORIDE SERPL-SCNC: 88 MMOL/L (ref 98–107)
CO2 SERPL-SCNC: 33 MMOL/L (ref 26–30)
COHGB MFR BLD: 1.5 % (ref 0–2)
CREAT BLD-MCNC: 0.3 MG/DL (ref 0.6–1.3)
DEPRECATED RDW RBC AUTO: 44.8 FL (ref 37–54)
ERYTHROCYTE [DISTWIDTH] IN BLOOD BY AUTOMATED COUNT: 12.5 % (ref 11.5–14.5)
FLUAV H1 RNA NPH QL NAA+NON-PROBE: NOT DETECTED
FLUAV H3 RNA NPH QL NAA+NON-PROBE: NOT DETECTED
FLUAV RNA SPEC QL NAA+PROBE: NOT DETECTED
FLUBV RNA SPEC QL NAA+PROBE: NOT DETECTED
GAS FLOW AIRWAY: 10 LPM
GFR SERPL CREATININE-BSD FRML MDRD: >150 ML/MIN/1.73
GLUCOSE BLD-MCNC: 186 MG/DL (ref 74–98)
GRAM STN SPEC: ABNORMAL
HADV DNA SPEC QL NAA+PROBE: NOT DETECTED
HCO3 BLDA-SCNC: 33.2 MMOL/L (ref 22–28)
HCOV 229E RNA NPH QL NAA+NON-PROBE: NOT DETECTED
HCOV HKU1 RNA NPH QL NAA+NON-PROBE: NOT DETECTED
HCOV NL63 RNA NPH QL NAA+NON-PROBE: NOT DETECTED
HCOV OC43 RNA NPH QL NAA+NON-PROBE: NOT DETECTED
HCT VFR BLD AUTO: 34.2 % (ref 37–47)
HCT VFR BLD CALC: 36.8 %
HGB BLD-MCNC: 11.9 G/DL (ref 12–16)
HGB BLDA-MCNC: 12 G/DL (ref 12–18)
HMPV RNA SPEC QL NAA+PROBE: NOT DETECTED
HPIV1 RNA SPEC QL NAA+PROBE: NOT DETECTED
HPIV2 SPEC QL CULT: NOT DETECTED
HPIV3 SPEC QL CULT: NOT DETECTED
HPIV4 RNA NPH QL NAA+NON-PROBE: NOT DETECTED
INR PPP: NOT DETECTED
L PNEUMO1 AG UR QL IA: NEGATIVE
Lab: ABNORMAL
M PNEUMO DNA SPEC QL NAA+PROBE: NOT DETECTED
MAGNESIUM SERPL-MCNC: 1.6 MG/DL (ref 1.6–2.3)
MCH RBC QN AUTO: 34.1 PG (ref 27–31)
MCHC RBC AUTO-ENTMCNC: 34.8 G/DL (ref 30–37)
MCV RBC AUTO: 98 FL (ref 81–99)
METHGB BLD QL: 0.8 % (ref 0–1.5)
MODALITY: ABNORMAL
NOTE: ABNORMAL
ORGANISM ID: ABNORMAL
OXYHGB MFR BLDV: 95.4 % (ref 94–99)
PCO2 BLDA: 40.6 MM HG (ref 35–45)
PCO2 TEMP ADJ BLD: ABNORMAL MM HG (ref 35–45)
PH BLDA: 7.52 PH UNITS (ref 7.3–7.5)
PH, TEMP CORRECTED: ABNORMAL PH UNITS
PHOSPHATE SERPL-MCNC: 2.9 MG/DL (ref 2.5–4.5)
PLATELET # BLD AUTO: 269 10*3/MM3 (ref 130–400)
PMV BLD AUTO: 9.6 FL (ref 6–12)
PO2 BLDA: 83.8 MM HG (ref 75–100)
PO2 TEMP ADJ BLD: ABNORMAL MM HG (ref 83–108)
POTASSIUM BLD-SCNC: 3.2 MMOL/L (ref 3.5–5.1)
RBC # BLD AUTO: 3.49 10*6/MM3 (ref 4.2–5.4)
RHINOVIRUS RNA SPEC QL NAA+PROBE: NOT DETECTED
RSV AG SPEC QL: NOT DETECTED
S PNEUM AG SPEC QL LA: POSITIVE
SAO2 % BLDCOA: 97.6 % (ref 94–100)
SODIUM BLD-SCNC: 129 MMOL/L (ref 137–145)
SPECIMEN SOURCE: ABNORMAL
VANCOMYCIN TROUGH SERPL-MCNC: 5.02 MCG/ML (ref 5–15)
VENTILATOR MODE: ABNORMAL
WBC NRBC COR # BLD: 9.25 10*3/MM3 (ref 4.8–10.8)

## 2018-12-27 PROCEDURE — 82805 BLOOD GASES W/O2 SATURATION: CPT

## 2018-12-27 PROCEDURE — 25010000002 PIPERACILLIN SOD-TAZOBACTAM PER 1 G: Performed by: HOSPITALIST

## 2018-12-27 PROCEDURE — 36600 WITHDRAWAL OF ARTERIAL BLOOD: CPT

## 2018-12-27 PROCEDURE — 25010000002 HEPARIN (PORCINE) PER 1000 UNITS: Performed by: HOSPITALIST

## 2018-12-27 PROCEDURE — 99232 SBSQ HOSP IP/OBS MODERATE 35: CPT | Performed by: INTERNAL MEDICINE

## 2018-12-27 PROCEDURE — 80202 ASSAY OF VANCOMYCIN: CPT

## 2018-12-27 PROCEDURE — 94799 UNLISTED PULMONARY SVC/PX: CPT

## 2018-12-27 PROCEDURE — 83050 HGB METHEMOGLOBIN QUAN: CPT

## 2018-12-27 PROCEDURE — 97530 THERAPEUTIC ACTIVITIES: CPT

## 2018-12-27 PROCEDURE — 82375 ASSAY CARBOXYHB QUANT: CPT

## 2018-12-27 PROCEDURE — 97116 GAIT TRAINING THERAPY: CPT

## 2018-12-27 PROCEDURE — 25010000002 LEVOFLOXACIN PER 250 MG: Performed by: HOSPITALIST

## 2018-12-27 PROCEDURE — 25010000002 VANCOMYCIN PER 500 MG: Performed by: HOSPITALIST

## 2018-12-27 PROCEDURE — 83735 ASSAY OF MAGNESIUM: CPT | Performed by: INTERNAL MEDICINE

## 2018-12-27 PROCEDURE — 85027 COMPLETE CBC AUTOMATED: CPT | Performed by: INTERNAL MEDICINE

## 2018-12-27 PROCEDURE — 25010000002 METHYLPREDNISOLONE PER 40 MG: Performed by: INTERNAL MEDICINE

## 2018-12-27 PROCEDURE — 71045 X-RAY EXAM CHEST 1 VIEW: CPT

## 2018-12-27 PROCEDURE — 80069 RENAL FUNCTION PANEL: CPT | Performed by: INTERNAL MEDICINE

## 2018-12-27 PROCEDURE — 25010000002 MAGNESIUM SULFATE 2 GM/50ML SOLUTION: Performed by: INTERNAL MEDICINE

## 2018-12-27 PROCEDURE — 25010000002 METHYLPREDNISOLONE PER 125 MG: Performed by: INTERNAL MEDICINE

## 2018-12-27 RX ORDER — NICOTINE 21 MG/24HR
PATCH, TRANSDERMAL 24 HOURS TRANSDERMAL
Qty: 28 PATCH | Refills: 0 | Status: SHIPPED | OUTPATIENT
Start: 2018-12-27 | End: 2019-02-13 | Stop reason: HOSPADM

## 2018-12-27 RX ORDER — POTASSIUM CHLORIDE 750 MG/1
40 CAPSULE, EXTENDED RELEASE ORAL 2 TIMES DAILY
Status: DISCONTINUED | OUTPATIENT
Start: 2018-12-27 | End: 2018-12-28 | Stop reason: HOSPADM

## 2018-12-27 RX ORDER — MAGNESIUM SULFATE HEPTAHYDRATE 40 MG/ML
2 INJECTION, SOLUTION INTRAVENOUS ONCE
Status: COMPLETED | OUTPATIENT
Start: 2018-12-27 | End: 2018-12-27

## 2018-12-27 RX ORDER — AMOXICILLIN AND CLAVULANATE POTASSIUM 875; 125 MG/1; MG/1
1 TABLET, FILM COATED ORAL EVERY 12 HOURS SCHEDULED
Status: DISCONTINUED | OUTPATIENT
Start: 2018-12-27 | End: 2018-12-28 | Stop reason: HOSPADM

## 2018-12-27 RX ORDER — METHYLPREDNISOLONE SODIUM SUCCINATE 40 MG/ML
40 INJECTION, POWDER, LYOPHILIZED, FOR SOLUTION INTRAMUSCULAR; INTRAVENOUS EVERY 8 HOURS
Status: DISCONTINUED | OUTPATIENT
Start: 2018-12-27 | End: 2018-12-28

## 2018-12-27 RX ADMIN — AMOXICILLIN AND CLAVULANATE POTASSIUM 1 TABLET: 875; 125 TABLET, FILM COATED ORAL at 20:26

## 2018-12-27 RX ADMIN — NICOTINE 1 PATCH: 14 PATCH TRANSDERMAL at 08:16

## 2018-12-27 RX ADMIN — GABAPENTIN 600 MG: 300 CAPSULE ORAL at 20:26

## 2018-12-27 RX ADMIN — METHYLPREDNISOLONE SODIUM SUCCINATE 40 MG: 40 INJECTION, POWDER, FOR SOLUTION INTRAMUSCULAR; INTRAVENOUS at 15:03

## 2018-12-27 RX ADMIN — AMLODIPINE BESYLATE 10 MG: 5 TABLET ORAL at 08:12

## 2018-12-27 RX ADMIN — VANCOMYCIN HYDROCHLORIDE 750 MG: 750 INJECTION, SOLUTION INTRAVENOUS at 11:18

## 2018-12-27 RX ADMIN — BUDESONIDE 1 MG: 0.5 INHALANT RESPIRATORY (INHALATION) at 07:24

## 2018-12-27 RX ADMIN — MAGNESIUM SULFATE HEPTAHYDRATE 2 G: 40 INJECTION, SOLUTION INTRAVENOUS at 18:17

## 2018-12-27 RX ADMIN — GABAPENTIN 600 MG: 300 CAPSULE ORAL at 08:12

## 2018-12-27 RX ADMIN — GABAPENTIN 600 MG: 300 CAPSULE ORAL at 02:11

## 2018-12-27 RX ADMIN — OXYCODONE HYDROCHLORIDE AND ACETAMINOPHEN 1 TABLET: 10; 325 TABLET ORAL at 16:21

## 2018-12-27 RX ADMIN — VANCOMYCIN HYDROCHLORIDE 750 MG: 750 INJECTION, SOLUTION INTRAVENOUS at 22:49

## 2018-12-27 RX ADMIN — METHOCARBAMOL TABLETS 500 MG: 500 TABLET, COATED ORAL at 17:59

## 2018-12-27 RX ADMIN — POTASSIUM CHLORIDE 40 MEQ: 750 CAPSULE, EXTENDED RELEASE ORAL at 08:12

## 2018-12-27 RX ADMIN — METHYLPREDNISOLONE SODIUM SUCCINATE 60 MG: 125 INJECTION, POWDER, FOR SOLUTION INTRAMUSCULAR; INTRAVENOUS at 06:23

## 2018-12-27 RX ADMIN — Medication: at 10:27

## 2018-12-27 RX ADMIN — AMOXICILLIN AND CLAVULANATE POTASSIUM 1 TABLET: 875; 125 TABLET, FILM COATED ORAL at 10:26

## 2018-12-27 RX ADMIN — METHYLPREDNISOLONE SODIUM SUCCINATE 40 MG: 40 INJECTION, POWDER, FOR SOLUTION INTRAMUSCULAR; INTRAVENOUS at 22:49

## 2018-12-27 RX ADMIN — TAZOBACTAM SODIUM AND PIPERACILLIN SODIUM 3.38 G: 375; 3 INJECTION, SOLUTION INTRAVENOUS at 04:00

## 2018-12-27 RX ADMIN — SODIUM CHLORIDE, PRESERVATIVE FREE 3 ML: 5 INJECTION INTRAVENOUS at 20:26

## 2018-12-27 RX ADMIN — BUDESONIDE 1 MG: 0.5 INHALANT RESPIRATORY (INHALATION) at 20:43

## 2018-12-27 RX ADMIN — SODIUM CHLORIDE, PRESERVATIVE FREE 3 ML: 5 INJECTION INTRAVENOUS at 08:13

## 2018-12-27 RX ADMIN — TRAMADOL HYDROCHLORIDE 50 MG: 50 TABLET, FILM COATED ORAL at 20:26

## 2018-12-27 RX ADMIN — TRAMADOL HYDROCHLORIDE 50 MG: 50 TABLET, FILM COATED ORAL at 15:03

## 2018-12-27 RX ADMIN — HEPARIN SODIUM 5000 UNITS: 5000 INJECTION, SOLUTION INTRAVENOUS; SUBCUTANEOUS at 20:26

## 2018-12-27 RX ADMIN — OXYCODONE HYDROCHLORIDE AND ACETAMINOPHEN 1 TABLET: 10; 325 TABLET ORAL at 22:25

## 2018-12-27 RX ADMIN — LEVOFLOXACIN 500 MG: 5 INJECTION, SOLUTION INTRAVENOUS at 02:11

## 2018-12-27 RX ADMIN — OXYCODONE HYDROCHLORIDE AND ACETAMINOPHEN 1 TABLET: 10; 325 TABLET ORAL at 10:25

## 2018-12-27 RX ADMIN — HEPARIN SODIUM 5000 UNITS: 5000 INJECTION, SOLUTION INTRAVENOUS; SUBCUTANEOUS at 08:13

## 2018-12-27 RX ADMIN — POTASSIUM CHLORIDE 40 MEQ: 750 CAPSULE, EXTENDED RELEASE ORAL at 20:26

## 2018-12-27 RX ADMIN — METHOCARBAMOL TABLETS 500 MG: 500 TABLET, COATED ORAL at 05:19

## 2018-12-27 RX ADMIN — GABAPENTIN 600 MG: 300 CAPSULE ORAL at 15:02

## 2018-12-27 RX ADMIN — TRAMADOL HYDROCHLORIDE 50 MG: 50 TABLET, FILM COATED ORAL at 08:12

## 2018-12-27 RX ADMIN — OXYCODONE HYDROCHLORIDE AND ACETAMINOPHEN 1 TABLET: 10; 325 TABLET ORAL at 04:02

## 2018-12-28 ENCOUNTER — APPOINTMENT (OUTPATIENT)
Dept: GENERAL RADIOLOGY | Facility: HOSPITAL | Age: 58
End: 2018-12-28

## 2018-12-28 VITALS
HEIGHT: 59 IN | WEIGHT: 89.25 LBS | OXYGEN SATURATION: 89 % | BODY MASS INDEX: 17.99 KG/M2 | TEMPERATURE: 98.9 F | SYSTOLIC BLOOD PRESSURE: 131 MMHG | DIASTOLIC BLOOD PRESSURE: 86 MMHG | HEART RATE: 89 BPM | RESPIRATION RATE: 16 BRPM

## 2018-12-28 LAB
ANION GAP SERPL CALCULATED.3IONS-SCNC: 11.6 MMOL/L (ref 10–20)
BUN BLD-MCNC: 10 MG/DL (ref 7–20)
BUN/CREAT SERPL: 50 (ref 7.1–23.5)
CALCIUM SPEC-SCNC: 8.2 MG/DL (ref 8.4–10.2)
CHLORIDE SERPL-SCNC: 92 MMOL/L (ref 98–107)
CO2 SERPL-SCNC: 29 MMOL/L (ref 26–30)
CREAT BLD-MCNC: 0.2 MG/DL (ref 0.6–1.3)
GFR SERPL CREATININE-BSD FRML MDRD: >150 ML/MIN/1.73
GLUCOSE BLD-MCNC: 129 MG/DL (ref 74–98)
MRSA SPEC QL CULT: NORMAL
POTASSIUM BLD-SCNC: 3.6 MMOL/L (ref 3.5–5.1)
PROCALCITONIN SERPL-MCNC: 0.83 NG/ML
SODIUM BLD-SCNC: 129 MMOL/L (ref 137–145)

## 2018-12-28 PROCEDURE — 25010000002 LEVOFLOXACIN PER 250 MG: Performed by: HOSPITALIST

## 2018-12-28 PROCEDURE — 80048 BASIC METABOLIC PNL TOTAL CA: CPT | Performed by: INTERNAL MEDICINE

## 2018-12-28 PROCEDURE — 84145 PROCALCITONIN (PCT): CPT | Performed by: INTERNAL MEDICINE

## 2018-12-28 PROCEDURE — 25010000002 METHYLPREDNISOLONE PER 40 MG: Performed by: INTERNAL MEDICINE

## 2018-12-28 PROCEDURE — 99239 HOSP IP/OBS DSCHRG MGMT >30: CPT | Performed by: NURSE PRACTITIONER

## 2018-12-28 PROCEDURE — 99232 SBSQ HOSP IP/OBS MODERATE 35: CPT | Performed by: INTERNAL MEDICINE

## 2018-12-28 PROCEDURE — 94760 N-INVAS EAR/PLS OXIMETRY 1: CPT

## 2018-12-28 PROCEDURE — 63710000001 PREDNISONE PER 5 MG: Performed by: INTERNAL MEDICINE

## 2018-12-28 PROCEDURE — 71046 X-RAY EXAM CHEST 2 VIEWS: CPT

## 2018-12-28 PROCEDURE — 94799 UNLISTED PULMONARY SVC/PX: CPT

## 2018-12-28 PROCEDURE — 25010000002 HEPARIN (PORCINE) PER 1000 UNITS: Performed by: HOSPITALIST

## 2018-12-28 RX ORDER — AMOXICILLIN AND CLAVULANATE POTASSIUM 875; 125 MG/1; MG/1
1 TABLET, FILM COATED ORAL EVERY 12 HOURS SCHEDULED
Qty: 3 TABLET | Refills: 0 | Status: SHIPPED | OUTPATIENT
Start: 2018-12-28 | End: 2018-12-30

## 2018-12-28 RX ORDER — DILTIAZEM HYDROCHLORIDE 120 MG/1
120 CAPSULE, COATED, EXTENDED RELEASE ORAL
Status: DISCONTINUED | OUTPATIENT
Start: 2018-12-28 | End: 2018-12-28 | Stop reason: HOSPADM

## 2018-12-28 RX ORDER — PREDNISONE 20 MG/1
20 TABLET ORAL
Status: DISCONTINUED | OUTPATIENT
Start: 2019-01-09 | End: 2018-12-28 | Stop reason: HOSPADM

## 2018-12-28 RX ORDER — BUDESONIDE AND FORMOTEROL FUMARATE DIHYDRATE 160; 4.5 UG/1; UG/1
2 AEROSOL RESPIRATORY (INHALATION) 2 TIMES DAILY
Qty: 10.2 G | Refills: 5 | Status: SHIPPED | OUTPATIENT
Start: 2018-12-28 | End: 2019-02-13 | Stop reason: HOSPADM

## 2018-12-28 RX ORDER — PREDNISONE 10 MG/1
10 TABLET ORAL
Status: DISCONTINUED | OUTPATIENT
Start: 2018-12-28 | End: 2018-12-28

## 2018-12-28 RX ORDER — PREDNISONE 50 MG/1
50 TABLET ORAL
Status: DISCONTINUED | OUTPATIENT
Start: 2018-12-31 | End: 2018-12-28 | Stop reason: HOSPADM

## 2018-12-28 RX ORDER — FLUCONAZOLE 100 MG/1
100 TABLET ORAL DAILY
Qty: 5 TABLET | Refills: 0 | Status: SHIPPED | OUTPATIENT
Start: 2018-12-28 | End: 2019-01-02

## 2018-12-28 RX ORDER — PREDNISONE 10 MG/1
10 TABLET ORAL
Status: DISCONTINUED | OUTPATIENT
Start: 2019-01-12 | End: 2018-12-28 | Stop reason: HOSPADM

## 2018-12-28 RX ORDER — PREDNISONE 10 MG/1
TABLET ORAL
Qty: 31 TABLET | Refills: 0 | Status: SHIPPED | OUTPATIENT
Start: 2018-12-28 | End: 2019-02-13 | Stop reason: HOSPADM

## 2018-12-28 RX ORDER — PREDNISONE 20 MG/1
40 TABLET ORAL
Status: DISCONTINUED | OUTPATIENT
Start: 2019-01-03 | End: 2018-12-28 | Stop reason: HOSPADM

## 2018-12-28 RX ADMIN — LEVOFLOXACIN 500 MG: 5 INJECTION, SOLUTION INTRAVENOUS at 02:44

## 2018-12-28 RX ADMIN — OXYCODONE HYDROCHLORIDE AND ACETAMINOPHEN 1 TABLET: 10; 325 TABLET ORAL at 10:41

## 2018-12-28 RX ADMIN — NICOTINE 1 PATCH: 14 PATCH TRANSDERMAL at 09:46

## 2018-12-28 RX ADMIN — METHYLPREDNISOLONE SODIUM SUCCINATE 40 MG: 40 INJECTION, POWDER, FOR SOLUTION INTRAMUSCULAR; INTRAVENOUS at 06:00

## 2018-12-28 RX ADMIN — TRAMADOL HYDROCHLORIDE 50 MG: 50 TABLET, FILM COATED ORAL at 16:47

## 2018-12-28 RX ADMIN — SODIUM CHLORIDE, PRESERVATIVE FREE 3 ML: 5 INJECTION INTRAVENOUS at 10:46

## 2018-12-28 RX ADMIN — POTASSIUM CHLORIDE 40 MEQ: 750 CAPSULE, EXTENDED RELEASE ORAL at 09:45

## 2018-12-28 RX ADMIN — HEPARIN SODIUM 5000 UNITS: 5000 INJECTION, SOLUTION INTRAVENOUS; SUBCUTANEOUS at 09:46

## 2018-12-28 RX ADMIN — GABAPENTIN 600 MG: 300 CAPSULE ORAL at 15:11

## 2018-12-28 RX ADMIN — GABAPENTIN 600 MG: 300 CAPSULE ORAL at 03:15

## 2018-12-28 RX ADMIN — GABAPENTIN 600 MG: 300 CAPSULE ORAL at 09:45

## 2018-12-28 RX ADMIN — TRAMADOL HYDROCHLORIDE 50 MG: 50 TABLET, FILM COATED ORAL at 09:45

## 2018-12-28 RX ADMIN — PREDNISONE 60 MG: 10 TABLET ORAL at 10:42

## 2018-12-28 RX ADMIN — DILTIAZEM HYDROCHLORIDE 120 MG: 120 CAPSULE, COATED, EXTENDED RELEASE ORAL at 10:45

## 2018-12-28 RX ADMIN — METHOCARBAMOL TABLETS 500 MG: 500 TABLET, COATED ORAL at 03:15

## 2018-12-28 RX ADMIN — OXYCODONE HYDROCHLORIDE AND ACETAMINOPHEN 1 TABLET: 10; 325 TABLET ORAL at 04:32

## 2018-12-28 RX ADMIN — OXYCODONE HYDROCHLORIDE AND ACETAMINOPHEN 1 TABLET: 10; 325 TABLET ORAL at 16:47

## 2018-12-28 RX ADMIN — AMOXICILLIN AND CLAVULANATE POTASSIUM 1 TABLET: 875; 125 TABLET, FILM COATED ORAL at 09:45

## 2018-12-29 ENCOUNTER — READMISSION MANAGEMENT (OUTPATIENT)
Dept: CALL CENTER | Facility: HOSPITAL | Age: 58
End: 2018-12-29

## 2018-12-29 LAB — MRSA SPEC QL CULT: NORMAL

## 2018-12-29 NOTE — OUTREACH NOTE
Prep Survey      Responses   Facility patient discharged from?  [facility]   Is patient eligible?  Yes   Discharge diagnosis  A/C resp failure, BIlateral PNA, Tobacco use   Does the patient have one of the following disease processes/diagnoses(primary or secondary)?  COPD/Pneumonia   Does the patient have Home health ordered?  No   What is the Home health agency?   refused HH   Is there a DME ordered?  Yes   What DME was ordered?  O2 from [facility]   Prep survey completed?  Yes          Ro Shaw RN

## 2018-12-31 LAB
A FUMIGATUS1 AB SER QL ID: NEGATIVE
A PULLULANS AB SER QL: NEGATIVE
LACEYELLA SACCHARI AB SER QL: NEGATIVE
MICROPOLYSPORA FAENI: NEGATIVE
PIGEON SERUM AB QL ID: NEGATIVE
T VULGARIS AB SER QL ID: NEGATIVE

## 2019-01-02 ENCOUNTER — OFFICE VISIT (OUTPATIENT)
Dept: PULMONOLOGY | Facility: CLINIC | Age: 59
End: 2019-01-02

## 2019-01-02 ENCOUNTER — READMISSION MANAGEMENT (OUTPATIENT)
Dept: CALL CENTER | Facility: HOSPITAL | Age: 59
End: 2019-01-02

## 2019-01-02 VITALS
DIASTOLIC BLOOD PRESSURE: 60 MMHG | HEIGHT: 59 IN | WEIGHT: 89 LBS | HEART RATE: 92 BPM | BODY MASS INDEX: 17.94 KG/M2 | SYSTOLIC BLOOD PRESSURE: 122 MMHG | OXYGEN SATURATION: 88 %

## 2019-01-02 DIAGNOSIS — R06.02 SHORTNESS OF BREATH: ICD-10-CM

## 2019-01-02 DIAGNOSIS — F17.200 SMOKING: ICD-10-CM

## 2019-01-02 DIAGNOSIS — J18.9 PNEUMONIA OF BOTH LUNGS DUE TO INFECTIOUS ORGANISM, UNSPECIFIED PART OF LUNG: Primary | ICD-10-CM

## 2019-01-02 DIAGNOSIS — J44.9 CHRONIC OBSTRUCTIVE PULMONARY DISEASE, UNSPECIFIED COPD TYPE (HCC): ICD-10-CM

## 2019-01-02 PROCEDURE — 94060 EVALUATION OF WHEEZING: CPT | Performed by: INTERNAL MEDICINE

## 2019-01-02 PROCEDURE — 99214 OFFICE O/P EST MOD 30 MIN: CPT | Performed by: NURSE PRACTITIONER

## 2019-01-02 PROCEDURE — 94729 DIFFUSING CAPACITY: CPT | Performed by: INTERNAL MEDICINE

## 2019-01-02 PROCEDURE — 94726 PLETHYSMOGRAPHY LUNG VOLUMES: CPT | Performed by: INTERNAL MEDICINE

## 2019-01-02 RX ORDER — LISINOPRIL 5 MG/1
5 TABLET ORAL DAILY
COMMUNITY
Start: 2018-03-14 | End: 2019-02-13 | Stop reason: HOSPADM

## 2019-01-02 RX ORDER — POTASSIUM CHLORIDE 750 MG/1
10 TABLET, FILM COATED, EXTENDED RELEASE ORAL 2 TIMES DAILY
Refills: 0 | COMMUNITY
Start: 2018-12-13 | End: 2019-02-13 | Stop reason: HOSPADM

## 2019-01-02 NOTE — OUTREACH NOTE
COPD/PN Week 1 Survey      Responses   Facility patient discharged from?  [facility]   Does the patient have one of the following disease processes/diagnoses(primary or secondary)?  COPD/Pneumonia   Is there a successful TCM telephone encounter documented?  No   Was the primary reason for admission:  Pneumonia   Week 1 attempt successful?  Yes   Call start time  1622   Call end time  1630   Discharge diagnosis  A/C resp failure, BIlateral PNA, Tobacco use   Meds reviewed with patient/caregiver?  Yes   Is the patient having any side effects they believe may be caused by any medication additions or changes?  No   Does the patient have all medications ordered at discharge?  Yes   Is the patient taking all medications as directed (includes completed medication regime)?  Yes   Does the patient have a primary care provider?   Yes   Does the patient have an appointment with their PCP or pulmonologist within 7 days of discharge?  Yes   Has the patient kept scheduled appointments due by today?  Yes   What is the Home health agency?   refused HH   What DME was ordered?  O2 from [facility]   Psychosocial issues?  No   Did the patient receive a copy of their discharge instructions?  Yes   Nursing interventions  Reviewed instructions with patient   What is the patient's perception of their health status since discharge?  Improving   Nursing Interventions  Nurse provided patient education   Are the patient's immunizations up to date?   Yes   Nursing interventions  Educated on importance of maintaining up to date immunizations as advised by provider   If the patient is a current smoker, are they able to teach back resources for cessation?  [id number]   Is the patient/caregiver able to teach back the hierarchy of who to call/visit for symptoms/problems? PCP, Specialist, Home health nurse, Urgent Care, ED, 911  Yes   Additional teach back comments  [facility] says patches aren't covered under her Part D.  Pharm D called to discuss.    Is the patient/caregiver able to teach back signs and symptoms of worsening condition:  Fever/chills, Shortness of breath, Chest pain   Is the patient/caregiver able to teach back importance of completing antibiotic course of treatment?  Yes   Week 1 call completed?  Yes          Kathy Azevedo RN

## 2019-01-02 NOTE — PROGRESS NOTES
"Chief Complaint   Patient presents with   • Follow-up   • Shortness of Breath         Subjective   Ce Reynolds is a 58 y.o. female.     History of Present Illness   The patient comes in today for follow up of recent hospitalization.     She was discharged on 12/28/18. She has finished all the antibiotics and continues to take the steroids. Her cough is intermittent and still productive but it has decreased greatly. The color of her sputum has also changed to a light yellow from a darker color.    She is using oxygen continuously at 3 LPM. Her resting oxygen saturation on room air was 88%.     She was started on Symbicort at discharge and continues to use this medication twice a day.     She feels overall her breathing has improved.     The following portions of the patient's history were reviewed and updated as appropriate: allergies, current medications, past family history, past medical history, past social history and past surgical history.    Review of Systems   Constitutional: Positive for chills. Negative for fever.   HENT: Negative for sinus pressure, sneezing and sore throat.    Respiratory: Negative for cough, shortness of breath and wheezing.        Objective   Visit Vitals  /60 (BP Location: Right arm, Patient Position: Sitting, Cuff Size: Adult)   Pulse 92   Ht 149.9 cm (59\")   Wt 40.4 kg (89 lb)   SpO2 (!) 88% Comment: resting on room air   BMI 17.98 kg/m²   SpO2 98% resting with 3 Lpm  Physical Exam   Constitutional: She is oriented to person, place, and time. She appears well-developed and well-nourished.   HENT:   Head: Atraumatic.   Crowded oropharynx. Dentures present.   Eyes: EOM are normal.   Neck: Neck supple.   Cardiovascular: Normal rate and regular rhythm.   Pulmonary/Chest: Effort normal. No respiratory distress.   Somewhat decreased A/E without wheezing noted.   Musculoskeletal: She exhibits no edema.   Gait normal.   Neurological: She is alert and oriented to person, place, and " time.   Skin: Skin is warm and dry.   Psychiatric: She has a normal mood and affect.   Vitals reviewed.          Assessment/Plan   Ce was seen today for follow-up and shortness of breath.    Diagnoses and all orders for this visit:    Pneumonia of both lungs due to infectious organism, unspecified part of lung    Chronic obstructive pulmonary disease, unspecified COPD type (CMS/[facility])  -     Cancel: Pulmonary Function Test; Future    Smoking    Shortness of breath  -     Cancel: Pulmonary Function Test; Future           Return in about 4 weeks (around 1/30/2019) for Recheck, For Me, Imaging studies.    DISCUSSION (if any):  I reviewed her discharge summary which mentions pneumonia of both lungs. I reviewed radiology as well.    Will repeat chest xray in about 3 weeks since it is too early to repeat it now. She just finished the antibiotic and is still on steroids.     I reviewed her PFT results from today and discussed the results with her. The PFT reveals moderate obstruction with a severely decreased diffusion capacity.     I have advised her to stop smoking.    She should continue using Symbicort twice a day. She should use the rescue inhaler as needed for increased shortness of breath and wheezing.       Dictated utilizing Dragon dictation.    This document was electronically signed by EVELIN Membreno January 2, 2019  2:42 PM

## 2019-01-09 ENCOUNTER — READMISSION MANAGEMENT (OUTPATIENT)
Dept: CALL CENTER | Facility: HOSPITAL | Age: 59
End: 2019-01-09

## 2019-01-09 NOTE — OUTREACH NOTE
COPD/PN Week 2 Survey      Responses   Facility patient discharged from?  [facility]   Does the patient have one of the following disease processes/diagnoses(primary or secondary)?  COPD/Pneumonia   Was the primary reason for admission:  Pneumonia   Week 2 attempt successful?  No   Unsuccessful attempts  Attempt 1          Joy Wallace RN

## 2019-01-10 ENCOUNTER — READMISSION MANAGEMENT (OUTPATIENT)
Dept: CALL CENTER | Facility: HOSPITAL | Age: 59
End: 2019-01-10

## 2019-01-10 NOTE — OUTREACH NOTE
COPD/PN Week 2 Survey      Responses   Facility patient discharged from?  [facility]   Does the patient have one of the following disease processes/diagnoses(primary or secondary)?  COPD/Pneumonia   Was the primary reason for admission:  Pneumonia   Week 2 attempt successful?  No   Unsuccessful attempts  Attempt 2          Kwesi Archuleta RN

## 2019-01-12 ENCOUNTER — READMISSION MANAGEMENT (OUTPATIENT)
Dept: CALL CENTER | Facility: HOSPITAL | Age: 59
End: 2019-01-12

## 2019-01-12 NOTE — OUTREACH NOTE
COPD/PN Week 3 Survey      Responses   Facility patient discharged from?  [facility]   Does the patient have one of the following disease processes/diagnoses(primary or secondary)?  COPD/Pneumonia   Was the primary reason for admission:  Pneumonia   Week 3 attempt successful?  Yes   Call start time  1328   Call end time  1331   Meds reviewed with patient/caregiver?  Yes   Is the patient taking all medications as directed (includes completed medication regime)?  Yes   Has the patient kept scheduled appointments due by today?  Yes   Psychosocial issues?  No   What is the patient's perception of their health status since discharge?  Improving   Week 3 call completed?  Yes   Revoked  No further contact(revokes)-requires comment   Graduated/Revoked comments  taking meds without problems, Health improving, no fever or chills          Tonya De Paz RN

## 2019-02-11 ENCOUNTER — APPOINTMENT (OUTPATIENT)
Dept: GENERAL RADIOLOGY | Facility: HOSPITAL | Age: 59
End: 2019-02-11

## 2019-02-11 ENCOUNTER — HOSPITAL ENCOUNTER (INPATIENT)
Facility: HOSPITAL | Age: 59
LOS: 1 days | Discharge: ANOTHER HOSPITAL | End: 2019-02-13
Attending: EMERGENCY MEDICINE | Admitting: INTERNAL MEDICINE

## 2019-02-11 ENCOUNTER — APPOINTMENT (OUTPATIENT)
Dept: CT IMAGING | Facility: HOSPITAL | Age: 59
End: 2019-02-11

## 2019-02-11 DIAGNOSIS — E87.1 HYPONATREMIA: ICD-10-CM

## 2019-02-11 DIAGNOSIS — J96.01 ACUTE RESPIRATORY FAILURE WITH HYPOXIA (HCC): Primary | ICD-10-CM

## 2019-02-11 DIAGNOSIS — A41.9 SEPSIS, DUE TO UNSPECIFIED ORGANISM: ICD-10-CM

## 2019-02-11 DIAGNOSIS — E87.6 HYPOKALEMIA: ICD-10-CM

## 2019-02-11 DIAGNOSIS — J18.9 PNEUMONIA OF BOTH LUNGS DUE TO INFECTIOUS ORGANISM, UNSPECIFIED PART OF LUNG: ICD-10-CM

## 2019-02-11 LAB
A-A DO2: ABNORMAL MMHG
ARTERIAL PATENCY WRIST A: POSITIVE
ATMOSPHERIC PRESS: 730 MMHG
BASE EXCESS BLDA CALC-SCNC: -4.3 MMOL/L (ref 0–2)
BDY SITE: ABNORMAL
COHGB MFR BLD: 3.3 % (ref 0–2)
EPAP: 8
HCO3 BLDA-SCNC: 21.9 MMOL/L (ref 22–28)
HCT VFR BLD CALC: 39.3 %
HGB BLDA-MCNC: 12.8 G/DL (ref 12–18)
HOROWITZ INDEX BLD+IHG-RTO: 50 %
IPAP: 16
METHGB BLD QL: 0.6 % (ref 0–1.5)
MODALITY: ABNORMAL
NOTE: ABNORMAL
OXYHGB MFR BLDV: 85 % (ref 94–99)
PCO2 BLDA: 43.5 MM HG (ref 35–45)
PCO2 TEMP ADJ BLD: ABNORMAL MM HG (ref 35–45)
PH BLDA: 7.31 PH UNITS (ref 7.3–7.5)
PH, TEMP CORRECTED: ABNORMAL PH UNITS
PO2 BLDA: 60.4 MM HG (ref 75–100)
PO2 TEMP ADJ BLD: ABNORMAL MM HG (ref 83–108)
SAO2 % BLDCOA: 88.4 % (ref 94–100)
SET MECH RESP RATE: 14
VENTILATOR MODE: ABNORMAL

## 2019-02-11 PROCEDURE — 83050 HGB METHEMOGLOBIN QUAN: CPT

## 2019-02-11 PROCEDURE — 84484 ASSAY OF TROPONIN QUANT: CPT | Performed by: PHYSICIAN ASSISTANT

## 2019-02-11 PROCEDURE — 83880 ASSAY OF NATRIURETIC PEPTIDE: CPT | Performed by: PHYSICIAN ASSISTANT

## 2019-02-11 PROCEDURE — 94640 AIRWAY INHALATION TREATMENT: CPT

## 2019-02-11 PROCEDURE — 85007 BL SMEAR W/DIFF WBC COUNT: CPT | Performed by: PHYSICIAN ASSISTANT

## 2019-02-11 PROCEDURE — 93005 ELECTROCARDIOGRAM TRACING: CPT | Performed by: PHYSICIAN ASSISTANT

## 2019-02-11 PROCEDURE — 80053 COMPREHEN METABOLIC PANEL: CPT | Performed by: PHYSICIAN ASSISTANT

## 2019-02-11 PROCEDURE — 82375 ASSAY CARBOXYHB QUANT: CPT

## 2019-02-11 PROCEDURE — 99285 EMERGENCY DEPT VISIT HI MDM: CPT

## 2019-02-11 PROCEDURE — 82805 BLOOD GASES W/O2 SATURATION: CPT

## 2019-02-11 PROCEDURE — 36600 WITHDRAWAL OF ARTERIAL BLOOD: CPT

## 2019-02-11 PROCEDURE — 85025 COMPLETE CBC W/AUTO DIFF WBC: CPT | Performed by: PHYSICIAN ASSISTANT

## 2019-02-11 PROCEDURE — 71045 X-RAY EXAM CHEST 1 VIEW: CPT

## 2019-02-11 RX ORDER — NALOXONE HCL 0.4 MG/ML
0.4 VIAL (ML) INJECTION ONCE
Status: COMPLETED | OUTPATIENT
Start: 2019-02-11 | End: 2019-02-12

## 2019-02-11 RX ORDER — METHYLPREDNISOLONE SODIUM SUCCINATE 125 MG/2ML
125 INJECTION, POWDER, LYOPHILIZED, FOR SOLUTION INTRAMUSCULAR; INTRAVENOUS ONCE
Status: COMPLETED | OUTPATIENT
Start: 2019-02-11 | End: 2019-02-12

## 2019-02-11 RX ORDER — IPRATROPIUM BROMIDE AND ALBUTEROL SULFATE 2.5; .5 MG/3ML; MG/3ML
3 SOLUTION RESPIRATORY (INHALATION) ONCE
Status: COMPLETED | OUTPATIENT
Start: 2019-02-11 | End: 2019-02-11

## 2019-02-11 RX ORDER — SODIUM CHLORIDE 0.9 % (FLUSH) 0.9 %
10 SYRINGE (ML) INJECTION AS NEEDED
Status: DISCONTINUED | OUTPATIENT
Start: 2019-02-11 | End: 2019-02-13 | Stop reason: HOSPADM

## 2019-02-11 RX ADMIN — IPRATROPIUM BROMIDE AND ALBUTEROL SULFATE 3 ML: .5; 3 SOLUTION RESPIRATORY (INHALATION) at 23:42

## 2019-02-12 ENCOUNTER — ANESTHESIA (OUTPATIENT)
Dept: ICU | Facility: HOSPITAL | Age: 59
End: 2019-02-12

## 2019-02-12 ENCOUNTER — APPOINTMENT (OUTPATIENT)
Dept: GENERAL RADIOLOGY | Facility: HOSPITAL | Age: 59
End: 2019-02-12

## 2019-02-12 ENCOUNTER — ANESTHESIA EVENT (OUTPATIENT)
Dept: ICU | Facility: HOSPITAL | Age: 59
End: 2019-02-12

## 2019-02-12 PROBLEM — J96.01 ACUTE RESPIRATORY FAILURE WITH HYPOXIA (HCC): Status: ACTIVE | Noted: 2019-02-12

## 2019-02-12 LAB
A-A DO2: ABNORMAL MMHG
ALBUMIN SERPL-MCNC: 2.9 G/DL (ref 3.5–5)
ALBUMIN/GLOB SERPL: 0.9 G/DL (ref 1–2)
ALP SERPL-CCNC: 147 U/L (ref 38–126)
ALT SERPL W P-5'-P-CCNC: 17 U/L (ref 13–69)
AMPHET+METHAMPHET UR QL: NEGATIVE
AMPHETAMINES UR QL: NEGATIVE
ANION GAP SERPL CALCULATED.3IONS-SCNC: 10.9 MMOL/L (ref 10–20)
ANION GAP SERPL CALCULATED.3IONS-SCNC: 12.5 MMOL/L (ref 10–20)
ANION GAP SERPL CALCULATED.3IONS-SCNC: 21.1 MMOL/L (ref 10–20)
ARTERIAL PATENCY WRIST A: ABNORMAL
ARTERIAL PATENCY WRIST A: ABNORMAL
ARTERIAL PATENCY WRIST A: POSITIVE
AST SERPL-CCNC: 74 U/L (ref 15–46)
ATMOSPHERIC PRESS: 726 MMHG
ATMOSPHERIC PRESS: 726 MMHG
ATMOSPHERIC PRESS: 732 MMHG
BARBITURATES UR QL SCN: NEGATIVE
BASE EXCESS BLDA CALC-SCNC: -10.7 MMOL/L (ref 0–2)
BASE EXCESS BLDA CALC-SCNC: -11.2 MMOL/L (ref 0–2)
BASE EXCESS BLDA CALC-SCNC: -2.1 MMOL/L (ref 0–2)
BASOPHILS # BLD AUTO: 0.02 10*3/MM3 (ref 0–0.2)
BASOPHILS # BLD AUTO: 0.08 10*3/MM3 (ref 0–0.2)
BASOPHILS NFR BLD AUTO: 1.4 % (ref 0–2.5)
BASOPHILS NFR BLD AUTO: 1.5 % (ref 0–2.5)
BDY SITE: ABNORMAL
BENZODIAZ UR QL SCN: NEGATIVE
BILIRUB SERPL-MCNC: 0.7 MG/DL (ref 0.2–1.3)
BUN BLD-MCNC: 11 MG/DL (ref 7–20)
BUN BLD-MCNC: 4 MG/DL (ref 7–20)
BUN BLD-MCNC: 8 MG/DL (ref 7–20)
BUN/CREAT SERPL: 13.8 (ref 7.1–23.5)
BUN/CREAT SERPL: 16 (ref 7.1–23.5)
BUN/CREAT SERPL: 8 (ref 7.1–23.5)
BUPRENORPHINE SERPL-MCNC: NEGATIVE NG/ML
CALCIUM SPEC-SCNC: 6.9 MG/DL (ref 8.4–10.2)
CALCIUM SPEC-SCNC: 7.3 MG/DL (ref 8.4–10.2)
CALCIUM SPEC-SCNC: 7.7 MG/DL (ref 8.4–10.2)
CANNABINOIDS SERPL QL: NEGATIVE
CHLORIDE SERPL-SCNC: 103 MMOL/L (ref 98–107)
CHLORIDE SERPL-SCNC: 80 MMOL/L (ref 98–107)
CHLORIDE SERPL-SCNC: 91 MMOL/L (ref 98–107)
CO2 SERPL-SCNC: 17 MMOL/L (ref 26–30)
CO2 SERPL-SCNC: 20 MMOL/L (ref 26–30)
CO2 SERPL-SCNC: 22 MMOL/L (ref 26–30)
COCAINE UR QL: NEGATIVE
COHGB MFR BLD: 1 % (ref 0–2)
COHGB MFR BLD: 1 % (ref 0–2)
COHGB MFR BLD: 1.6 % (ref 0–2)
CREAT BLD-MCNC: 0.5 MG/DL (ref 0.6–1.3)
CREAT BLD-MCNC: 0.5 MG/DL (ref 0.6–1.3)
CREAT BLD-MCNC: 0.8 MG/DL (ref 0.6–1.3)
CRP SERPL-MCNC: 45 MG/DL (ref 0–1)
D-LACTATE SERPL-SCNC: 2.8 MMOL/L (ref 0.5–2)
D-LACTATE SERPL-SCNC: 6.1 MMOL/L (ref 0.5–2)
DEPRECATED RDW RBC AUTO: 40.4 FL (ref 37–54)
DEPRECATED RDW RBC AUTO: 45 FL (ref 37–54)
EOSINOPHIL # BLD AUTO: 0 10*3/MM3 (ref 0–0.7)
EOSINOPHIL # BLD AUTO: 0.01 10*3/MM3 (ref 0–0.7)
EOSINOPHIL NFR BLD AUTO: 0 % (ref 0–7)
EOSINOPHIL NFR BLD AUTO: 0.2 % (ref 0–7)
ERYTHROCYTE [DISTWIDTH] IN BLOOD BY AUTOMATED COUNT: 12.3 % (ref 11.5–14.5)
ERYTHROCYTE [DISTWIDTH] IN BLOOD BY AUTOMATED COUNT: 12.8 % (ref 11.5–14.5)
ERYTHROCYTE [SEDIMENTATION RATE] IN BLOOD: 36 MM/HR (ref 0–20)
FLUAV AG NPH QL: NEGATIVE
FLUBV AG NPH QL IA: NEGATIVE
GFR SERPL CREATININE-BSD FRML MDRD: 127 ML/MIN/1.73
GFR SERPL CREATININE-BSD FRML MDRD: 127 ML/MIN/1.73
GFR SERPL CREATININE-BSD FRML MDRD: 74 ML/MIN/1.73
GLOBULIN UR ELPH-MCNC: 3.1 GM/DL
GLUCOSE BLD-MCNC: 76 MG/DL (ref 74–98)
GLUCOSE BLD-MCNC: 96 MG/DL (ref 74–98)
GLUCOSE BLD-MCNC: 96 MG/DL (ref 74–98)
HCO3 BLDA-SCNC: 18.1 MMOL/L (ref 22–28)
HCO3 BLDA-SCNC: 19 MMOL/L (ref 22–28)
HCO3 BLDA-SCNC: 22.9 MMOL/L (ref 22–28)
HCT VFR BLD AUTO: 34.6 % (ref 37–47)
HCT VFR BLD AUTO: 34.9 % (ref 37–47)
HCT VFR BLD CALC: 31.6 %
HCT VFR BLD CALC: 35.7 %
HCT VFR BLD CALC: 37 %
HGB BLD-MCNC: 12.2 G/DL (ref 12–16)
HGB BLD-MCNC: 12.7 G/DL (ref 12–16)
HGB BLDA-MCNC: 10.3 G/DL (ref 12–18)
HGB BLDA-MCNC: 11.7 G/DL (ref 12–18)
HGB BLDA-MCNC: 12.1 G/DL (ref 12–18)
HOLD SPECIMEN: NORMAL
HOROWITZ INDEX BLD+IHG-RTO: 80 %
HOROWITZ INDEX BLD+IHG-RTO: 90 %
HOROWITZ INDEX BLD+IHG-RTO: 90 %
IMM GRANULOCYTES # BLD AUTO: 0.03 10*3/MM3 (ref 0–0.06)
IMM GRANULOCYTES # BLD AUTO: 0.08 10*3/MM3 (ref 0–0.06)
IMM GRANULOCYTES NFR BLD AUTO: 1.4 % (ref 0–0.6)
IMM GRANULOCYTES NFR BLD AUTO: 2.2 % (ref 0–0.6)
LYMPHOCYTES # BLD AUTO: 0.12 10*3/MM3 (ref 0.6–3.4)
LYMPHOCYTES # BLD AUTO: 1.08 10*3/MM3 (ref 0.6–3.4)
LYMPHOCYTES NFR BLD AUTO: 19 % (ref 10–50)
LYMPHOCYTES NFR BLD AUTO: 8.8 % (ref 10–50)
MAGNESIUM SERPL-MCNC: 1.2 MG/DL (ref 1.6–2.3)
MCH RBC QN AUTO: 32.7 PG (ref 27–31)
MCH RBC QN AUTO: 34 PG (ref 27–31)
MCHC RBC AUTO-ENTMCNC: 35.3 G/DL (ref 30–37)
MCHC RBC AUTO-ENTMCNC: 36.4 G/DL (ref 30–37)
MCV RBC AUTO: 89.9 FL (ref 81–99)
MCV RBC AUTO: 96.4 FL (ref 81–99)
METHADONE UR QL SCN: NEGATIVE
METHGB BLD QL: 0.7 % (ref 0–1.5)
METHGB BLD QL: 1.1 % (ref 0–1.5)
METHGB BLD QL: 1.1 % (ref 0–1.5)
MODALITY: ABNORMAL
MONOCYTES # BLD AUTO: 0.02 10*3/MM3 (ref 0–0.9)
MONOCYTES # BLD AUTO: 0.12 10*3/MM3 (ref 0–0.9)
MONOCYTES NFR BLD AUTO: 1.5 % (ref 0–12)
MONOCYTES NFR BLD AUTO: 2.1 % (ref 0–12)
NEUTROPHILS # BLD AUTO: 1.18 10*3/MM3 (ref 2–6.9)
NEUTROPHILS # BLD AUTO: 4.31 10*3/MM3 (ref 2–6.9)
NEUTROPHILS NFR BLD AUTO: 75.9 % (ref 37–80)
NEUTROPHILS NFR BLD AUTO: 86 % (ref 37–80)
NOTE: ABNORMAL
NRBC BLD AUTO-RTO: 0 /100 WBC (ref 0–0)
NRBC BLD AUTO-RTO: 0 /100 WBC (ref 0–0)
NT-PROBNP SERPL-MCNC: 1870 PG/ML (ref 0–125)
OPIATES UR QL: NEGATIVE
OXYCODONE UR QL SCN: POSITIVE
OXYHGB MFR BLDV: 90.1 % (ref 94–99)
OXYHGB MFR BLDV: 93.9 % (ref 94–99)
OXYHGB MFR BLDV: 95.4 % (ref 94–99)
PCO2 BLDA: 39 MM HG (ref 35–45)
PCO2 BLDA: 53.1 MM HG (ref 35–45)
PCO2 BLDA: 61.9 MM HG (ref 35–45)
PCO2 TEMP ADJ BLD: ABNORMAL MM HG (ref 35–45)
PCP UR QL SCN: NEGATIVE
PEEP RESPIRATORY: 8 CM[H2O]
PH BLDA: 7.09 PH UNITS (ref 7.3–7.5)
PH BLDA: 7.14 PH UNITS (ref 7.3–7.5)
PH BLDA: 7.38 PH UNITS (ref 7.3–7.5)
PH, TEMP CORRECTED: ABNORMAL PH UNITS
PLATELET # BLD AUTO: 212 10*3/MM3 (ref 130–400)
PLATELET # BLD AUTO: 270 10*3/MM3 (ref 130–400)
PMV BLD AUTO: 9.4 FL (ref 6–12)
PMV BLD AUTO: 9.7 FL (ref 6–12)
PO2 BLDA: 105 MM HG (ref 75–100)
PO2 BLDA: 80.1 MM HG (ref 75–100)
PO2 BLDA: 83.4 MM HG (ref 75–100)
PO2 TEMP ADJ BLD: ABNORMAL MM HG (ref 83–108)
POTASSIUM BLD-SCNC: 1.9 MMOL/L (ref 3.5–5.1)
POTASSIUM BLD-SCNC: 2.1 MMOL/L (ref 3.5–5.1)
POTASSIUM BLD-SCNC: 3.5 MMOL/L (ref 3.5–5.1)
PROCALCITONIN SERPL-MCNC: 151.29 NG/ML
PROPOXYPH UR QL: NEGATIVE
PROT SERPL-MCNC: 6 G/DL (ref 6.3–8.2)
RBC # BLD AUTO: 3.59 10*6/MM3 (ref 4.2–5.4)
RBC # BLD AUTO: 3.88 10*6/MM3 (ref 4.2–5.4)
RBC MORPH BLD: NORMAL
RBC MORPH BLD: NORMAL
SAO2 % BLDCOA: 92 % (ref 94–100)
SAO2 % BLDCOA: 96.1 % (ref 94–100)
SAO2 % BLDCOA: 97.4 % (ref 94–100)
SET MECH RESP RATE: 24
SMALL PLATELETS BLD QL SMEAR: ADEQUATE
SMALL PLATELETS BLD QL SMEAR: ADEQUATE
SODIUM BLD-SCNC: 119 MMOL/L (ref 137–145)
SODIUM BLD-SCNC: 122 MMOL/L (ref 137–145)
SODIUM BLD-SCNC: 129 MMOL/L (ref 137–145)
TRICYCLICS UR QL SCN: NEGATIVE
TROPONIN I SERPL-MCNC: <0.012 NG/ML (ref 0–0.03)
TROPONIN I SERPL-MCNC: <0.012 NG/ML (ref 0–0.03)
VENTILATOR MODE: ABNORMAL
VENTILATOR MODE: ABNORMAL
VENTILATOR MODE: AC
VT ON VENT VENT: 450 ML
WBC MORPH BLD: NORMAL
WBC MORPH BLD: NORMAL
WBC NRBC COR # BLD: 1.37 10*3/MM3 (ref 4.8–10.8)
WBC NRBC COR # BLD: 5.68 10*3/MM3 (ref 4.8–10.8)
WHOLE BLOOD HOLD SPECIMEN: NORMAL

## 2019-02-12 PROCEDURE — 87077 CULTURE AEROBIC IDENTIFY: CPT | Performed by: INTERNAL MEDICINE

## 2019-02-12 PROCEDURE — 25010000002 MIDAZOLAM 50 MG/10ML SOLUTION 10 ML VIAL: Performed by: INTERNAL MEDICINE

## 2019-02-12 PROCEDURE — 25010000002 METHYLPREDNISOLONE PER 125 MG: Performed by: PHYSICIAN ASSISTANT

## 2019-02-12 PROCEDURE — 94002 VENT MGMT INPAT INIT DAY: CPT

## 2019-02-12 PROCEDURE — 0BH17EZ INSERTION OF ENDOTRACHEAL AIRWAY INTO TRACHEA, VIA NATURAL OR ARTIFICIAL OPENING: ICD-10-PCS | Performed by: NURSE ANESTHETIST, CERTIFIED REGISTERED

## 2019-02-12 PROCEDURE — 94799 UNLISTED PULMONARY SVC/PX: CPT

## 2019-02-12 PROCEDURE — 80048 BASIC METABOLIC PNL TOTAL CA: CPT | Performed by: INTERNAL MEDICINE

## 2019-02-12 PROCEDURE — 74018 RADEX ABDOMEN 1 VIEW: CPT

## 2019-02-12 PROCEDURE — 83050 HGB METHEMOGLOBIN QUAN: CPT

## 2019-02-12 PROCEDURE — 25010000002 MAGNESIUM SULFATE IN D5W 1G/100ML (PREMIX) 1-5 GM/100ML-% SOLUTION: Performed by: INTERNAL MEDICINE

## 2019-02-12 PROCEDURE — 86140 C-REACTIVE PROTEIN: CPT | Performed by: INTERNAL MEDICINE

## 2019-02-12 PROCEDURE — 25010000002 ENOXAPARIN PER 10 MG: Performed by: INTERNAL MEDICINE

## 2019-02-12 PROCEDURE — 25010000002 ALBUMIN HUMAN 25% PER 50 ML: Performed by: INTERNAL MEDICINE

## 2019-02-12 PROCEDURE — 82533 TOTAL CORTISOL: CPT | Performed by: INTERNAL MEDICINE

## 2019-02-12 PROCEDURE — 86738 MYCOPLASMA ANTIBODY: CPT | Performed by: INTERNAL MEDICINE

## 2019-02-12 PROCEDURE — 25010000002 AZITHROMYCIN: Performed by: INTERNAL MEDICINE

## 2019-02-12 PROCEDURE — 87449 NOS EACH ORGANISM AG IA: CPT | Performed by: INTERNAL MEDICINE

## 2019-02-12 PROCEDURE — 87186 SC STD MICRODIL/AGAR DIL: CPT | Performed by: INTERNAL MEDICINE

## 2019-02-12 PROCEDURE — 25010000002 METHYLPREDNISOLONE PER 125 MG: Performed by: INTERNAL MEDICINE

## 2019-02-12 PROCEDURE — 82805 BLOOD GASES W/O2 SATURATION: CPT

## 2019-02-12 PROCEDURE — 84484 ASSAY OF TROPONIN QUANT: CPT | Performed by: INTERNAL MEDICINE

## 2019-02-12 PROCEDURE — 83605 ASSAY OF LACTIC ACID: CPT | Performed by: PHYSICIAN ASSISTANT

## 2019-02-12 PROCEDURE — 25010000003 POTASSIUM CHLORIDE 10 MEQ/100ML SOLUTION: Performed by: EMERGENCY MEDICINE

## 2019-02-12 PROCEDURE — 25010000002 PROPOFOL 1000 MG/ML EMULSION: Performed by: FAMILY MEDICINE

## 2019-02-12 PROCEDURE — 25010000002 MAGNESIUM SULFATE IN D5W 1G/100ML (PREMIX) 1-5 GM/100ML-% SOLUTION: Performed by: FAMILY MEDICINE

## 2019-02-12 PROCEDURE — 25010000002 VANCOMYCIN PER 500 MG: Performed by: PHYSICIAN ASSISTANT

## 2019-02-12 PROCEDURE — 25010000002 PROPOFOL 200 MG/20ML EMULSION: Performed by: NURSE ANESTHETIST, CERTIFIED REGISTERED

## 2019-02-12 PROCEDURE — 99223 1ST HOSP IP/OBS HIGH 75: CPT | Performed by: INTERNAL MEDICINE

## 2019-02-12 PROCEDURE — 25010000002 TOBRAMYCIN PER 80 MG: Performed by: PHYSICIAN ASSISTANT

## 2019-02-12 PROCEDURE — 84145 PROCALCITONIN (PCT): CPT | Performed by: PHYSICIAN ASSISTANT

## 2019-02-12 PROCEDURE — 25010000002 SUCCINYLCHOLINE PER 20 MG: Performed by: NURSE ANESTHETIST, CERTIFIED REGISTERED

## 2019-02-12 PROCEDURE — 87040 BLOOD CULTURE FOR BACTERIA: CPT | Performed by: PHYSICIAN ASSISTANT

## 2019-02-12 PROCEDURE — 80306 DRUG TEST PRSMV INSTRMNT: CPT | Performed by: INTERNAL MEDICINE

## 2019-02-12 PROCEDURE — 5A1935Z RESPIRATORY VENTILATION, LESS THAN 24 CONSECUTIVE HOURS: ICD-10-PCS | Performed by: INTERNAL MEDICINE

## 2019-02-12 PROCEDURE — 87147 CULTURE TYPE IMMUNOLOGIC: CPT | Performed by: INTERNAL MEDICINE

## 2019-02-12 PROCEDURE — 87804 INFLUENZA ASSAY W/OPTIC: CPT | Performed by: INTERNAL MEDICINE

## 2019-02-12 PROCEDURE — 25810000003 SODIUM CHLORIDE 0.9 % WITH KCL 20 MEQ 20-0.9 MEQ/L-% SOLUTION: Performed by: INTERNAL MEDICINE

## 2019-02-12 PROCEDURE — 71045 X-RAY EXAM CHEST 1 VIEW: CPT

## 2019-02-12 PROCEDURE — 85007 BL SMEAR W/DIFF WBC COUNT: CPT | Performed by: INTERNAL MEDICINE

## 2019-02-12 PROCEDURE — P9047 ALBUMIN (HUMAN), 25%, 50ML: HCPCS | Performed by: INTERNAL MEDICINE

## 2019-02-12 PROCEDURE — 25010000003 POTASSIUM CHLORIDE 10 MEQ/100ML SOLUTION: Performed by: INTERNAL MEDICINE

## 2019-02-12 PROCEDURE — 25010000002 PIPERACILLIN SOD-TAZOBACTAM PER 1 G: Performed by: INTERNAL MEDICINE

## 2019-02-12 PROCEDURE — 87070 CULTURE OTHR SPECIMN AEROBIC: CPT | Performed by: INTERNAL MEDICINE

## 2019-02-12 PROCEDURE — 85025 COMPLETE CBC W/AUTO DIFF WBC: CPT | Performed by: INTERNAL MEDICINE

## 2019-02-12 PROCEDURE — 25010000002 VANCOMYCIN PER 500 MG: Performed by: INTERNAL MEDICINE

## 2019-02-12 PROCEDURE — 87899 AGENT NOS ASSAY W/OPTIC: CPT | Performed by: INTERNAL MEDICINE

## 2019-02-12 PROCEDURE — 83735 ASSAY OF MAGNESIUM: CPT | Performed by: INTERNAL MEDICINE

## 2019-02-12 PROCEDURE — 85651 RBC SED RATE NONAUTOMATED: CPT | Performed by: INTERNAL MEDICINE

## 2019-02-12 PROCEDURE — 25010000002 PIPERACILLIN SOD-TAZOBACTAM PER 1 G: Performed by: PHYSICIAN ASSISTANT

## 2019-02-12 PROCEDURE — 25010000002 NALOXONE PER 1 MG: Performed by: PHYSICIAN ASSISTANT

## 2019-02-12 PROCEDURE — 36600 WITHDRAWAL OF ARTERIAL BLOOD: CPT

## 2019-02-12 PROCEDURE — 82375 ASSAY CARBOXYHB QUANT: CPT

## 2019-02-12 RX ORDER — SUCCINYLCHOLINE CHLORIDE 20 MG/ML
INJECTION INTRAMUSCULAR; INTRAVENOUS AS NEEDED
Status: DISCONTINUED | OUTPATIENT
Start: 2019-02-12 | End: 2019-02-12 | Stop reason: SURG

## 2019-02-12 RX ORDER — IPRATROPIUM BROMIDE AND ALBUTEROL SULFATE 2.5; .5 MG/3ML; MG/3ML
3 SOLUTION RESPIRATORY (INHALATION) EVERY 4 HOURS PRN
Status: DISCONTINUED | OUTPATIENT
Start: 2019-02-12 | End: 2019-02-13 | Stop reason: HOSPADM

## 2019-02-12 RX ORDER — GUAIFENESIN 600 MG/1
600 TABLET, EXTENDED RELEASE ORAL EVERY 12 HOURS SCHEDULED
Status: DISCONTINUED | OUTPATIENT
Start: 2019-02-12 | End: 2019-02-12 | Stop reason: CLARIF

## 2019-02-12 RX ORDER — MORPHINE SULFATE 2 MG/ML
2 INJECTION, SOLUTION INTRAMUSCULAR; INTRAVENOUS EVERY 4 HOURS PRN
Status: DISCONTINUED | OUTPATIENT
Start: 2019-02-12 | End: 2019-02-13 | Stop reason: HOSPADM

## 2019-02-12 RX ORDER — LISINOPRIL 5 MG/1
5 TABLET ORAL
Status: DISCONTINUED | OUTPATIENT
Start: 2019-02-12 | End: 2019-02-12

## 2019-02-12 RX ORDER — ACETAMINOPHEN 325 MG/1
650 TABLET ORAL EVERY 4 HOURS PRN
Status: DISCONTINUED | OUTPATIENT
Start: 2019-02-12 | End: 2019-02-13 | Stop reason: HOSPADM

## 2019-02-12 RX ORDER — IPRATROPIUM BROMIDE AND ALBUTEROL SULFATE 2.5; .5 MG/3ML; MG/3ML
3 SOLUTION RESPIRATORY (INHALATION)
Status: DISCONTINUED | OUTPATIENT
Start: 2019-02-12 | End: 2019-02-13 | Stop reason: HOSPADM

## 2019-02-12 RX ORDER — NICOTINE 21 MG/24HR
1 PATCH, TRANSDERMAL 24 HOURS TRANSDERMAL
Status: DISCONTINUED | OUTPATIENT
Start: 2019-02-12 | End: 2019-02-13 | Stop reason: HOSPADM

## 2019-02-12 RX ORDER — POTASSIUM CHLORIDE 7.45 MG/ML
10 INJECTION INTRAVENOUS ONCE
Status: DISCONTINUED | OUTPATIENT
Start: 2019-02-12 | End: 2019-02-12

## 2019-02-12 RX ORDER — PROPOFOL 10 MG/ML
INJECTION, EMULSION INTRAVENOUS AS NEEDED
Status: DISCONTINUED | OUTPATIENT
Start: 2019-02-12 | End: 2019-02-12 | Stop reason: SURG

## 2019-02-12 RX ORDER — SODIUM CHLORIDE 0.9 % (FLUSH) 0.9 %
3-10 SYRINGE (ML) INJECTION AS NEEDED
Status: DISCONTINUED | OUTPATIENT
Start: 2019-02-12 | End: 2019-02-13 | Stop reason: HOSPADM

## 2019-02-12 RX ORDER — POTASSIUM CHLORIDE 750 MG/1
40 CAPSULE, EXTENDED RELEASE ORAL ONCE
Status: COMPLETED | OUTPATIENT
Start: 2019-02-12 | End: 2019-02-12

## 2019-02-12 RX ORDER — METHYLPREDNISOLONE SODIUM SUCCINATE 125 MG/2ML
80 INJECTION, POWDER, LYOPHILIZED, FOR SOLUTION INTRAMUSCULAR; INTRAVENOUS EVERY 8 HOURS
Status: DISCONTINUED | OUTPATIENT
Start: 2019-02-12 | End: 2019-02-13 | Stop reason: HOSPADM

## 2019-02-12 RX ORDER — SODIUM CHLORIDE FOR INHALATION 3 %
4 VIAL, NEBULIZER (ML) INHALATION ONCE
Status: COMPLETED | OUTPATIENT
Start: 2019-02-12 | End: 2019-02-12

## 2019-02-12 RX ORDER — BUDESONIDE 0.5 MG/2ML
0.5 INHALANT ORAL
Status: DISCONTINUED | OUTPATIENT
Start: 2019-02-12 | End: 2019-02-13 | Stop reason: HOSPADM

## 2019-02-12 RX ORDER — POTASSIUM CHLORIDE 1.5 G/1.77G
20 POWDER, FOR SOLUTION ORAL
Status: COMPLETED | OUTPATIENT
Start: 2019-02-12 | End: 2019-02-12

## 2019-02-12 RX ORDER — GABAPENTIN 400 MG/1
400 CAPSULE ORAL EVERY 8 HOURS SCHEDULED
Status: DISCONTINUED | OUTPATIENT
Start: 2019-02-12 | End: 2019-02-13 | Stop reason: HOSPADM

## 2019-02-12 RX ORDER — MAGNESIUM SULFATE 1 G/100ML
1 INJECTION INTRAVENOUS ONCE
Status: COMPLETED | OUTPATIENT
Start: 2019-02-12 | End: 2019-02-12

## 2019-02-12 RX ORDER — POTASSIUM CHLORIDE 7.45 MG/ML
10 INJECTION INTRAVENOUS
Status: COMPLETED | OUTPATIENT
Start: 2019-02-12 | End: 2019-02-12

## 2019-02-12 RX ORDER — ALBUMIN (HUMAN) 12.5 G/50ML
25 SOLUTION INTRAVENOUS ONCE
Status: COMPLETED | OUTPATIENT
Start: 2019-02-12 | End: 2019-02-12

## 2019-02-12 RX ORDER — CHLORHEXIDINE GLUCONATE 0.12 MG/ML
15 RINSE ORAL EVERY 12 HOURS SCHEDULED
Status: DISCONTINUED | OUTPATIENT
Start: 2019-02-12 | End: 2019-02-13 | Stop reason: HOSPADM

## 2019-02-12 RX ORDER — L.ACID,PARA/B.BIFIDUM/S.THERM 8B CELL
1 CAPSULE ORAL 2 TIMES DAILY
Status: DISCONTINUED | OUTPATIENT
Start: 2019-02-12 | End: 2019-02-13 | Stop reason: HOSPADM

## 2019-02-12 RX ORDER — OXYCODONE AND ACETAMINOPHEN 10; 325 MG/1; MG/1
1 TABLET ORAL EVERY 6 HOURS PRN
Status: DISCONTINUED | OUTPATIENT
Start: 2019-02-12 | End: 2019-02-13 | Stop reason: HOSPADM

## 2019-02-12 RX ORDER — SODIUM CHLORIDE AND POTASSIUM CHLORIDE 150; 900 MG/100ML; MG/100ML
100 INJECTION, SOLUTION INTRAVENOUS CONTINUOUS
Status: DISCONTINUED | OUTPATIENT
Start: 2019-02-12 | End: 2019-02-13

## 2019-02-12 RX ORDER — ONDANSETRON 2 MG/ML
4 INJECTION INTRAMUSCULAR; INTRAVENOUS EVERY 6 HOURS PRN
Status: DISCONTINUED | OUTPATIENT
Start: 2019-02-12 | End: 2019-02-13 | Stop reason: HOSPADM

## 2019-02-12 RX ORDER — AMLODIPINE BESYLATE 5 MG/1
10 TABLET ORAL DAILY
Status: DISCONTINUED | OUTPATIENT
Start: 2019-02-12 | End: 2019-02-12

## 2019-02-12 RX ORDER — SODIUM CHLORIDE 0.9 % (FLUSH) 0.9 %
3 SYRINGE (ML) INJECTION EVERY 12 HOURS SCHEDULED
Status: DISCONTINUED | OUTPATIENT
Start: 2019-02-12 | End: 2019-02-13 | Stop reason: HOSPADM

## 2019-02-12 RX ORDER — POTASSIUM CHLORIDE 7.45 MG/ML
10 INJECTION INTRAVENOUS
Status: DISCONTINUED | OUTPATIENT
Start: 2019-02-12 | End: 2019-02-12

## 2019-02-12 RX ORDER — PROPOFOL 10 MG/ML
INJECTION, EMULSION INTRAVENOUS
Status: COMPLETED
Start: 2019-02-12 | End: 2019-02-12

## 2019-02-12 RX ORDER — POTASSIUM CHLORIDE 750 MG/1
20 CAPSULE, EXTENDED RELEASE ORAL 2 TIMES DAILY WITH MEALS
Status: DISCONTINUED | OUTPATIENT
Start: 2019-02-12 | End: 2019-02-12

## 2019-02-12 RX ORDER — FAMOTIDINE 10 MG/ML
20 INJECTION, SOLUTION INTRAVENOUS EVERY 12 HOURS SCHEDULED
Status: DISCONTINUED | OUTPATIENT
Start: 2019-02-12 | End: 2019-02-13 | Stop reason: HOSPADM

## 2019-02-12 RX ADMIN — POTASSIUM CHLORIDE 20 MEQ: 1.5 POWDER, FOR SOLUTION ORAL at 12:07

## 2019-02-12 RX ADMIN — VANCOMYCIN HYDROCHLORIDE 500 MG: 500 INJECTION, POWDER, LYOPHILIZED, FOR SOLUTION INTRAVENOUS at 16:02

## 2019-02-12 RX ADMIN — MAGNESIUM SULFATE HEPTAHYDRATE 1 G: 1 INJECTION, SOLUTION INTRAVENOUS at 10:25

## 2019-02-12 RX ADMIN — SUCCINYLCHOLINE CHLORIDE 120 MG: 20 INJECTION, SOLUTION INTRAMUSCULAR; INTRAVENOUS at 13:24

## 2019-02-12 RX ADMIN — PROPOFOL 20 MCG/KG/MIN: 10 INJECTION, EMULSION INTRAVENOUS at 13:53

## 2019-02-12 RX ADMIN — POTASSIUM CHLORIDE 20 MEQ: 1.5 POWDER, FOR SOLUTION ORAL at 17:06

## 2019-02-12 RX ADMIN — GUAIFENESIN 600 MG: 600 TABLET, EXTENDED RELEASE ORAL at 08:32

## 2019-02-12 RX ADMIN — SODIUM CHLORIDE 1000 ML: 9 INJECTION, SOLUTION INTRAVENOUS at 01:28

## 2019-02-12 RX ADMIN — Medication 1 CAPSULE: at 21:32

## 2019-02-12 RX ADMIN — Medication 400 MG: at 21:32

## 2019-02-12 RX ADMIN — FAMOTIDINE 20 MG: 10 INJECTION INTRAVENOUS at 16:02

## 2019-02-12 RX ADMIN — METHYLPREDNISOLONE SODIUM SUCCINATE 125 MG: 125 INJECTION, POWDER, FOR SOLUTION INTRAMUSCULAR; INTRAVENOUS at 00:42

## 2019-02-12 RX ADMIN — IPRATROPIUM BROMIDE AND ALBUTEROL SULFATE 3 ML: .5; 3 SOLUTION RESPIRATORY (INHALATION) at 06:45

## 2019-02-12 RX ADMIN — POTASSIUM CHLORIDE AND SODIUM CHLORIDE 100 ML/HR: 900; 150 INJECTION, SOLUTION INTRAVENOUS at 21:50

## 2019-02-12 RX ADMIN — ENOXAPARIN SODIUM 40 MG: 40 INJECTION SUBCUTANEOUS at 04:33

## 2019-02-12 RX ADMIN — GUAIFENESIN 200 MG: 200 SOLUTION ORAL at 21:32

## 2019-02-12 RX ADMIN — MIDAZOLAM 5 MG/HR: 5 INJECTION INTRAMUSCULAR; INTRAVENOUS at 14:30

## 2019-02-12 RX ADMIN — POTASSIUM CHLORIDE AND SODIUM CHLORIDE 100 ML/HR: 900; 150 INJECTION, SOLUTION INTRAVENOUS at 13:02

## 2019-02-12 RX ADMIN — POTASSIUM CHLORIDE 20 MEQ: 1.5 POWDER, FOR SOLUTION ORAL at 16:01

## 2019-02-12 RX ADMIN — Medication 400 MG: at 10:25

## 2019-02-12 RX ADMIN — VANCOMYCIN HYDROCHLORIDE 750 MG: 750 INJECTION, SOLUTION INTRAVENOUS at 03:12

## 2019-02-12 RX ADMIN — GABAPENTIN 400 MG: 400 CAPSULE ORAL at 21:32

## 2019-02-12 RX ADMIN — METHYLPREDNISOLONE SODIUM SUCCINATE 80 MG: 125 INJECTION, POWDER, FOR SOLUTION INTRAMUSCULAR; INTRAVENOUS at 16:02

## 2019-02-12 RX ADMIN — FAMOTIDINE 20 MG: 10 INJECTION INTRAVENOUS at 21:33

## 2019-02-12 RX ADMIN — OXYCODONE HYDROCHLORIDE AND ACETAMINOPHEN 1 TABLET: 10; 325 TABLET ORAL at 16:02

## 2019-02-12 RX ADMIN — Medication 1 CAPSULE: at 08:33

## 2019-02-12 RX ADMIN — ALBUMIN HUMAN 25 G: 0.25 SOLUTION INTRAVENOUS at 18:00

## 2019-02-12 RX ADMIN — POTASSIUM CHLORIDE 10 MEQ: 7.46 INJECTION, SOLUTION INTRAVENOUS at 03:42

## 2019-02-12 RX ADMIN — PROPOFOL 30 MCG/KG/MIN: 10 INJECTION, EMULSION INTRAVENOUS at 19:18

## 2019-02-12 RX ADMIN — SODIUM CHLORIDE, PRESERVATIVE FREE 3 ML: 5 INJECTION INTRAVENOUS at 08:33

## 2019-02-12 RX ADMIN — POTASSIUM CHLORIDE 10 MEQ: 7.46 INJECTION, SOLUTION INTRAVENOUS at 01:29

## 2019-02-12 RX ADMIN — POTASSIUM CHLORIDE 10 MEQ: 7.46 INJECTION, SOLUTION INTRAVENOUS at 08:31

## 2019-02-12 RX ADMIN — METHYLPREDNISOLONE SODIUM SUCCINATE 80 MG: 125 INJECTION, POWDER, FOR SOLUTION INTRAMUSCULAR; INTRAVENOUS at 08:32

## 2019-02-12 RX ADMIN — POTASSIUM CHLORIDE 20 MEQ: 1.5 POWDER, FOR SOLUTION ORAL at 14:41

## 2019-02-12 RX ADMIN — NALOXONE HYDROCHLORIDE 0.4 MG: 0.4 INJECTION, SOLUTION INTRAMUSCULAR; INTRAVENOUS; SUBCUTANEOUS at 00:02

## 2019-02-12 RX ADMIN — POTASSIUM CHLORIDE AND SODIUM CHLORIDE 100 ML/HR: 900; 150 INJECTION, SOLUTION INTRAVENOUS at 03:50

## 2019-02-12 RX ADMIN — POTASSIUM CHLORIDE 20 MEQ: 750 CAPSULE, EXTENDED RELEASE ORAL at 08:32

## 2019-02-12 RX ADMIN — NICOTINE 1 PATCH: 21 PATCH TRANSDERMAL at 13:03

## 2019-02-12 RX ADMIN — IPRATROPIUM BROMIDE AND ALBUTEROL SULFATE 3 ML: .5; 3 SOLUTION RESPIRATORY (INHALATION) at 19:19

## 2019-02-12 RX ADMIN — POTASSIUM CHLORIDE 10 MEQ: 7.46 INJECTION, SOLUTION INTRAVENOUS at 02:48

## 2019-02-12 RX ADMIN — CHLORHEXIDINE GLUCONATE 0.12% ORAL RINSE 15 ML: 1.2 LIQUID ORAL at 21:32

## 2019-02-12 RX ADMIN — PROPOFOL 150 MG: 10 INJECTION, EMULSION INTRAVENOUS at 13:24

## 2019-02-12 RX ADMIN — MIDAZOLAM 2 MG/HR: 5 INJECTION INTRAMUSCULAR; INTRAVENOUS at 21:49

## 2019-02-12 RX ADMIN — CHLORHEXIDINE GLUCONATE 0.12% ORAL RINSE 15 ML: 1.2 LIQUID ORAL at 16:01

## 2019-02-12 RX ADMIN — POTASSIUM CHLORIDE 20 MEQ: 1.5 POWDER, FOR SOLUTION ORAL at 10:25

## 2019-02-12 RX ADMIN — TOBRAMYCIN 250 MG: 40 INJECTION INTRAMUSCULAR; INTRAVENOUS at 03:02

## 2019-02-12 RX ADMIN — BUDESONIDE 0.5 MG: 0.5 INHALANT RESPIRATORY (INHALATION) at 12:06

## 2019-02-12 RX ADMIN — POTASSIUM CHLORIDE 20 MEQ: 1.5 POWDER, FOR SOLUTION ORAL at 18:00

## 2019-02-12 RX ADMIN — POTASSIUM CHLORIDE 10 MEQ: 7.46 INJECTION, SOLUTION INTRAVENOUS at 04:35

## 2019-02-12 RX ADMIN — SODIUM CHLORIDE SOLN NEBU 3% 4 ML: 3 NEBU SOLN at 06:45

## 2019-02-12 RX ADMIN — AZITHROMYCIN 500 MG: 500 INJECTION, POWDER, LYOPHILIZED, FOR SOLUTION INTRAVENOUS at 03:56

## 2019-02-12 RX ADMIN — SODIUM CHLORIDE 1000 ML: 9 INJECTION, SOLUTION INTRAVENOUS at 20:07

## 2019-02-12 RX ADMIN — POTASSIUM CHLORIDE 10 MEQ: 7.46 INJECTION, SOLUTION INTRAVENOUS at 07:27

## 2019-02-12 RX ADMIN — MAGNESIUM SULFATE HEPTAHYDRATE 1 G: 1 INJECTION, SOLUTION INTRAVENOUS at 06:16

## 2019-02-12 RX ADMIN — TAZOBACTAM SODIUM AND PIPERACILLIN SODIUM 3.38 G: 375; 3 INJECTION, SOLUTION INTRAVENOUS at 01:49

## 2019-02-12 RX ADMIN — GABAPENTIN 400 MG: 400 CAPSULE ORAL at 08:35

## 2019-02-12 RX ADMIN — IPRATROPIUM BROMIDE AND ALBUTEROL SULFATE 3 ML: .5; 3 SOLUTION RESPIRATORY (INHALATION) at 12:06

## 2019-02-12 RX ADMIN — POTASSIUM CHLORIDE 10 MEQ: 7.46 INJECTION, SOLUTION INTRAVENOUS at 06:16

## 2019-02-12 RX ADMIN — BUDESONIDE 0.5 MG: 0.5 INHALANT RESPIRATORY (INHALATION) at 19:19

## 2019-02-12 RX ADMIN — TAZOBACTAM SODIUM AND PIPERACILLIN SODIUM 4.5 G: 500; 4 INJECTION, SOLUTION INTRAVENOUS at 16:02

## 2019-02-12 RX ADMIN — POTASSIUM CHLORIDE 40 MEQ: 750 CAPSULE, EXTENDED RELEASE ORAL at 06:22

## 2019-02-12 RX ADMIN — TAZOBACTAM SODIUM AND PIPERACILLIN SODIUM 4.5 G: 500; 4 INJECTION, SOLUTION INTRAVENOUS at 08:32

## 2019-02-12 NOTE — MEDICAL STUDENT
[facility]    Name:  Ce Reynolds   Age:  58 y.o.  Sex:  female  :  1960  MRN:  [id number]   Visit Number:  [id number]  Admission Date:  2019  Date Of Service:  19  Primary Care Physician:  Lyssa Luciano APRN     LOS: 0 days :  Patient Care Team:  Lyssa Luciano APRN as PCP - Sherry Knight MD as Consulting Physician (General Surgery):    Procedure:  Emergent endotracheal airway placement    Indication:  Patient oxygen desaturation on BiPAP     Patient Condition:   Preoxygenated easily with BVM prior to procedure to increase oxygen saturation    Procedure Details:  Patient was given succinyl choline and propofol for sedation and paralytic.  Patient was preoxygenated with BVM ventilation and oxygen.  Larynx was visualized via direct laryngoscopy with Ophelia 3 blade.  7.5mm ET Tube was inserted in the trachea with assistance of intubating stylet.  Tube was visualized going through the vocal chords, stylet removed, and cuff inflated to 21 cm H2O.  The tube was secured to the patient with tube measured at 22cm at the lip.  Patients lungs were auscultated as well as capnography was used to confirm placement.  Endotracheal intubation was successful on first attempt with no complications.    Torsten Aquino  19  1:49 PM
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
## 2019-02-12 NOTE — PROGRESS NOTES
Malnutrition Severity Assessment    Patient Name:  Ce Reynolds  YOB: 1960  MRN: [id number]  Admit Date:  2/11/2019    Patient meets criteria for : Moderate malnutrition    Comments:  Pt was seen today and may qualify for moderate malnutrition based on weight and NFPE (Nutrition Focused Physical Exam). Significant weight loss of 10 lbs. (11%) in 3 months; moderate muscle wasted noted on exam with shoulders moderately squared; knees showed significant fat and muscle loss; inner thigh showed fat loss; fingers showed slight clubbing. Pt stated that her appetite has declined over the past few months agreeing that she has early satiety and that she gets tired before she can finish a meal. Rec #1: Continue current diet order. RD ordered nutritional supplement of Boost Plus BID to promote PO intake. Pt is currently dependent on BiPAP with reported poor PO intake. Rec #2: Consider MVI with minerals. Rec #3: Continue to monitor and replace electrolytes. RD to follow pt. Consult RD PRN.       Malnutrition Type: Chronic Illness Malnutrition     Malnutrition Type (last 8 hours)      Malnutrition Severity Assessment     Row Name 02/12/19 1317       Malnutrition Severity Assessment    Malnutrition Type  Chronic Illness Malnutrition    Row Name 02/12/19 1041       Malnutrition Severity Assessment    Malnutrition Type  Chronic Illness Malnutrition  (Pended)     Row Name 02/12/19 1317       Physical Signs of Malnutrition (Chronic)    Muscle Wasting  Mild    Fat Loss  Mild    Fluid Accumulation  Mild    Secondary Physical Signs  Present (comment) Pt dependent on BiPAP    Row Name 02/12/19 1041       Physical Signs of Malnutrition (Chronic)    Muscle Wasting  Mild  (Pended)     Fat Loss  Mild  (Pended)     Fluid Accumulation  None  (Pended)     Secondary Physical Signs  Present (comment)  (Pended)     Row Name 02/12/19 1317       Weight Status (Chronic)    BMI  Mod (<17)    %IBW  Mild (<90%)    %UBW  -- Unable to  assess.     Weight Loss  Severe (>7.5% / 3 mo) ~11% in 3 months    Row Name 02/12/19 1041       Weight Status (Chronic)    BMI  Mod (<17)  (Pended)     %IBW  Mild (<90%)  (Pended)     %UBW  Mild (86-90%)  (Pended)  Pt. wt. h/o shows ~91 lbs UBW    Weight Loss  Severe (>7.5% / 3 mo)  (Pended)  ~11% in three months    Row Name 02/12/19 1317       Energy Intake Status (Chronic)    Energy Intake  Mod (<75% / > or equal to 1 mo) Per pt, poor appetite    Row Name 02/12/19 1317       Criteria Met (Must meet criteria for severity in at least 2 of these categories: M Wasting, Fat Loss, Fluid, Secondary Signs, Wt. Status, Intake)    Patient meets criteria for   Moderate malnutrition    Row Name 02/12/19 1041       Criteria Met (Must meet criteria for severity in at least 2 of these categories: M Wasting, Fat Loss, Fluid, Secondary Signs, Wt. Status, Intake)    Patient meets criteria for   Mild malnutrition  (Pended)           Electronically signed by:  Joyce Mark RD  02/12/19 1:21 PM

## 2019-02-12 NOTE — ANESTHESIA PROCEDURE NOTES
Airway  Urgency: emergent      General Information and Staff    CRNA: Jose Noriega CRNA    Consent for Airway (if performed for an anesthetic, see related documentation for consents)  Patient identity confirmed: arm band, provided demographic data and hospital-assigned identification number      Indications and Patient Condition    Preoxygenated: yes  Mask difficulty assessment: 1 - vent by mask    Final Airway Details  Final airway type: endotracheal airway      Successful airway: ETT  Cuffed: yes   Successful intubation technique: direct laryngoscopy  Facilitating devices/methods: intubating stylet  Endotracheal tube insertion site: oral  Blade: Ophelia  Blade size: 3  ETT size (mm): 7.5  Cormack-Lehane Classification: grade IIa - partial view of glottis  Placement verified by: chest auscultation and capnometry   Inital cuff pressure (cm H2O): 21  Measured from: lips  Number of attempts at approach: 1

## 2019-02-12 NOTE — PROGRESS NOTES
Adult Nutrition  Assessment/PES    Patient Name:  Ce Reynolds  YOB: 1960  MRN: [id number]  Admit Date:  2/11/2019    Assessment Date:  2/12/2019    Comments:  Rec #1: Continue current diet order; Encouraging intake. Nutritional supplement ordered Boost Plus BID to promote PO intake. Rec #2: Consider MVI with minerals daily. Rec #3: Continue to monitor/replace electrolytes PRN. RD to follow pt. Consult RD PRN.       Reason for Assessment     Row Name 02/12/19 1030          Reason for Assessment    Reason For Assessment  identified at risk by screening criteria;diagnosis/disease state  (Pended)      Diagnosis  pulmonary disease;metabolic state  (Pended)  COPD, Met. enceph., Acute resp. failure     Identified At Risk by Screening Criteria  MST SCORE 2+;unintentional loss of 10 lbs or more in the past 2 mos;BMI  (Pended)  LACE             Labs/Tests/Procedures/Meds     Row Name 02/12/19 1032          Labs/Procedures/Meds    Lab Results Reviewed  reviewed, pertinent  (Pended)      Lab Results Comments  High: C-reactive protein, procalcitonin; Low: sodium, potassium, magnesium, albumin, creatinine, chloride  (Pended)         Medications    Pertinent Medications Reviewed  reviewed, pertinent  (Pended)      Pertinent Medications Comments  Methylprednisolone  (Pended)          Physical Findings     Row Name 02/12/19 1034          Physical Findings    Overall Physical Appearance  underweight  (Pended)          Estimated/Assessed Needs     Row Name 02/12/19 1034          Calculation Measurements    Weight Used For Calculations  43.6 kg (96 lb 0.9 oz)  (Pended)  IBW        Estimated/Assessed Needs    Additional Documentation  Fluid Requirements (Group);[place]-St. Jeor Equation (Group);Protein Requirements (Group);Calorie Requirements (Group)  (Pended)         Calorie Requirements    Estimated Calorie Need Method  [place]-St Jeor  (Pended)      Estimated Calorie Requirement Comment  2395-0026  (Pended)  AF  1.2        KCAL/KG    14 Kcal/Kg (kcal)  609.98  (Pended)      15 Kcal/Kg (kcal)  653.55  (Pended)      18 Kcal/Kg (kcal)  784.26  (Pended)      20 Kcal/Kg (kcal)  871.4  (Pended)      25 Kcal/Kg (kcal)  1089.25  (Pended)      30 Kcal/Kg (kcal)  1307.1  (Pended)      35 Kcal/Kg (kcal)  1524.95  (Pended)      40 Kcal/Kg (kcal)  1742.8  (Pended)      45 Kcal/Kg (kcal)  1960.65  (Pended)      50 Kcal/Kg (kcal)  2178.5  (Pended)         [place]-St. Jeor Equation    RMR ([place]-St. Jeor Equation)  926.33  (Pended)         Protein Requirements    Est Protein Requirement Amount (gms/kg)  1.5 gm protein  (Pended)  52-65 gm     Estimated Protein Requirements (gms/day)  65.36  (Pended)         Fluid Requirements    Estimated Fluid Requirement Method  [place]-Segar Formula  (Pended)      [place]-Segar Method (over 20 kg)  2371.4  (Pended)          Nutrition Prescription Ordered     Row Name 02/12/19 1041          Nutrition Prescription PO    Current PO Diet  Regular  (Pended)      Common Modifiers  Cardiac  (Pended)          Evaluation of Received Nutrient/Fluid Intake     Row Name 02/12/19 1041 02/12/19 1034       Calculation Measurements    Weight Used For Calculations  --  43.6 kg (96 lb 0.9 oz)  (Pended)  IBW       PO Evaluation    Number of Days PO Intake Evaluated  Insufficient Data  (Pended)   --        Evaluation of Prescribed Nutrient/Fluid Intake     Row Name 02/12/19 1034          Calculation Measurements    Weight Used For Calculations  43.6 kg (96 lb 0.9 oz)  (Pended)  IBW         Malnutrition Severity Assessment     Row Name 02/12/19 1041          Malnutrition Severity Assessment    Malnutrition Type  Chronic Illness Malnutrition  (Pended)         Physical Signs of Malnutrition (Chronic)    Muscle Wasting  Mild  (Pended)      Fat Loss  Mild  (Pended)      Fluid Accumulation  None  (Pended)      Secondary Physical Signs  Present (comment)  (Pended)  Shoulders moderately squared, Knees showed significant fat and  muscle loss, inner thigh showed fat loss, fingers showed slight clubbing        Weight Status (Chronic)    BMI  Mod (<17)  (Pended)      %IBW  Mild (<90%)  (Pended)      %UBW  Mild (86-90%)  (Pended)  Pt. wt. h/o shows ~91 lbs UBW     Weight Loss  Severe (>7.5% / 3 mo)  (Pended)  ~11% in three months        Criteria Met (Must meet criteria for severity in at least 2 of these categories: M Wasting, Fat Loss, Fluid, Secondary Signs, Wt. Status, Intake)    Patient meets criteria for   Mild malnutrition  (Pended)            Problem/Interventions:  Problem 1     Row Name 02/12/19 1049          Nutrition Diagnoses Problem 1    Problem 1  Underweight  (Pended)      Etiology (related to)  Factors Affecting Nutrition  (Pended)      Appetite  Poor  (Pended)      Signs/Symptoms (evidenced by)  BMI;% IBW;% UBW  (Pended)      BMI  16 - 16.9  (Pended)      Percent (%) IBW  84 %  (Pended)      Percent (%) UBW  89 %  (Pended)          Problem 2     Row Name 02/12/19 1056          Nutrition Diagnoses Problem 2    Problem 2  Increased Nutrient Needs  (Pended)      Macronutrient  Kcal;Carbohydrate;Fat;Protein  (Pended)      Etiology (related to)  Medical Diagnosis  (Pended)      Pulmonary/Critical Care  COPD;Acute respiratory failure  (Pended)      Signs/Symptoms (evidenced by)  Other (comment);Biochemical  (Pended)  Pulmonary dysfunction     Specific Labs Noted  Other (comment)  (Pended)  Pro-calcitonin               Intervention Goal     Row Name 02/12/19 1059          Intervention Goal    General  Meet nutritional needs for age/condition  (Pended)      PO  Establish PO;PO intake (%);Tolerate PO  (Pended)      PO Intake %  50 %  (Pended)      Weight  Appropriate weight gain  (Pended)          Nutrition Intervention     Row Name 02/12/19 1059          Nutrition Intervention    RD/Tech Action  Follow Tx progress;Interview for preference;Encourage intake;Recommend/ordered  (Pended)      Recommended/Ordered  Supplement  (Pended)           Nutrition Prescription     Row Name 02/12/19 1100          Nutrition Prescription PO    PO Prescription  Begin/change supplement  (Pended)      Supplement  Boost Plus  (Pended)      Supplement Frequency  2 times a day  (Pended)         Other Orders    Obtain Weight  Daily  (Pended)      Obtain Weight Ordered?  No, recommended  (Pended)      Supplement  Vitamin mineral supplement  (Pended)      Supplement Ordered?  No, recommended  (Pended)          Education/Evaluation     Row Name 02/12/19 1100          Education    Education  Will Instruct as appropriate  (Pended)         Monitor/Evaluation    Monitor  Per protocol;I&O;PO intake;Supplement intake;Weight  (Pended)            Electronically signed by:  Kacie Monteiro  02/12/19 11:02 AM

## 2019-02-12 NOTE — PROGRESS NOTES
Interim progress note.    Events reviewed.  The patient had to be intubated because of worsening respiratory status.    The patient's latest ABG was reviewed.  Lab Results   Component Value Date    PHART 7.095 (C) 02/12/2019    AWU7PVY 61.9 (C) 02/12/2019    PO2ART 83.4 02/12/2019    HGBBG 12.1 02/12/2019    Y0BDVAAV 92.0 (L) 02/12/2019    CARBOXYHGB 1.0 02/12/2019     Ventilator settings have been adjusted.    ABG will be repeated around 8 PM.    The patient has developed increasing tachycardia and rule out volume depletion as a source, I have ordered nursing staff to administer 1 amp of albumin for fluid resuscitation purposes.    Chest x-ray and ABG will be repeated in the morning.    She is currently on propofol and Versed for sedation.    The risks of bronchoscopy including but not limited to damage to the overlying structures, pneumothorax, bleeding, worsening of respiratory failure, requirement for chest tube placement among others were explained.    The alternatives were already discussed with the patient before intubation.      The patient has considered the above-mentioned risks, benefits and alternatives and has agreed to proceed with the procedure.    Her condition is critical and prognosis is poor at this time.     This document was electronically signed by Hao Perales MD on 02/12/19 at 4:47 PM

## 2019-02-12 NOTE — PROGRESS NOTES
[facility]    [facility]    Name:  Ce Reynolds   Age:  58 y.o.  Sex:  female  :  1960  MRN:  [id number]   Visit Number:  [id number]  Admission Date:  2019  Date Of Service:  19  Primary Care Physician:  Lyssa Luciano APRN     LOS: 0 days :      Chief Complaint:  Follow up bilateral pneumonia and hyponatremia        Subjective / Interval History: Patient admitted last night with recurrent bilateral interstitial pneumonia. She also was admitted with chronic diarrhea and severe hyponatremia and hypokalemia severe. Dr Ibarra and Dr Perales have been consulted in the case. Patient had cough, shortness of breath, anxiety and fatigue. She denied abdominal pain or chest pain. Nursing reported chronic diarrhea, as prior to admission. Influenza negative.     Patient was seen this morning and again around 1:15 pm. She has developed acute respiratory distress, with hypoxia despite 80%FiO2 on bipap. Dr Perales was contacted and recommended patient be intubated and placed on mechanical ventilator.     Addendum saw patient again with sedation not enough yet and patient moving has tried to pull at tubes and lines. Bilateral wrist restraints added and I saw patient at time of order 1346. Also, collins catheter ordered.       Review of Systems:     General ROS: Patient denies any fevers, chills or loss of consciousness. +Generalized weakness  Ophthalmic ROS: Denies any diplopia or transient loss of vision.  ENT ROS: Denies sore throat, nasal congestion or ear pain.   Respiratory ROS: Increased cough with worsening shortness of breath.  Cardiovascular ROS: Denies chest pain or palpitations. No history of exertional chest pain.   Gastrointestinal ROS: Denies nausea and vomiting. Denies any abdominal pain. No diarrhea.  Genito-Urinary ROS: Denies dysuria or hematuria.  Musculoskeletal ROS:Denies  back pain. No muscle pain. No calf pain.  Neurological ROS: Denies any focal  weakness. No loss of consciousness. Denies any numbness. Denies neck pain.   Dermatological ROS: Denies any redness or pruritis.    Vital Signs:    Temp:  [97.3 °F (36.3 °C)-98 °F (36.7 °C)] 97.7 °F (36.5 °C)  Heart Rate:  [] 120  Resp:  [18-28] 23  BP: ()/(45-79) 106/45  FiO2 (%):  [60 %-80 %] 80 %    Intake and output:    I/O last 3 completed shifts:  In: 1325 [I.V.:1275; IV Piggyback:50]  Out: -   I/O this shift:  In: 140 [P.O.:140]  Out: -     Physical Examination:    General Appearance:    Alert and cooperative, rapid respirations   Head:    Atraumatic and normocephalic   Eyes:            PERRLA, EOMI. No conjunctivitis   Throat:   No oral lesions, no thrush, oral mucosa moist.   Neck:   Supple, trachea midline, no thyromegaly   Lungs:     Bilateral crackles, slight wheeze left sided. No rhonchi.     Heart:    Normal S1 and S2, no murmur, no gallop   Abdomen:     Soft, nontender, nondistended   Extremities:   Moves all extremities well, no edema, no cyanosis, no             clubbing   Skin:   No bleeding, bruising or rash.   Neurologic:   No tremor, sensation intact, Motor power is normal and equal bilaterally.     Laboratory results:    Lab Results (last 24 hours)     Procedure Component Value Units Date/Time    S. Pneumo Ag Urine or CSF - Urine, Urine, Clean Catch [[id number]] Collected:  02/12/19 1044    Specimen:  Urine, Clean Catch Updated:  02/12/19 1252    Legionella Antigen, Urine - Urine, Urine, Clean Catch [[id number]] Collected:  02/12/19 1044    Specimen:  Urine, Clean Catch Updated:  02/12/19 1252    Sedimentation Rate [[id number]]  (Abnormal) Collected:  02/12/19 1045    Specimen:  Blood Updated:  02/12/19 1158     Sed Rate 36 mm/hr     Urine Drug Screen - Urine, Clean Catch [[id number]]  (Abnormal) Collected:  02/12/19 1044    Specimen:  Urine, Clean Catch Updated:  02/12/19 1109     THC, Screen, Urine Negative     Phencyclidine (PCP), Urine Negative     Cocaine Screen, Urine Negative      Methamphetamine, Urine Negative     Opiate Screen Negative     Amphetamine Screen, Urine Negative     Benzodiazepine Screen, Urine Negative     Tricyclic Antidepressants Screen Negative     Methadone Screen, Urine Negative     Barbiturates Screen, Urine Negative     Oxycodone Screen, Urine Positive     Propoxyphene Screen Negative     Buprenorphine, Screen, Urine Negative    Narrative:       Limitations of this procedure include the possibility of false positives due to interfering substances in the urine sample. Clinical data should be correlated with any questionable result. Positive results should be considered Presumptive Positive until results are confirmed with another methodology such as HPLC or GCMS.    Mycoplasma Pneumoniae Antibody, IgM [[id number]] Collected:  02/12/19 1045    Specimen:  Blood Updated:  02/12/19 1050    Influenza Antigen, Rapid - Swab, Nasopharynx [[id number]]  (Normal) Collected:  02/12/19 0939    Specimen:  Swab from Nasopharynx Updated:  02/12/19 0957     Influenza A Ag, EIA Negative     Influenza B Ag, EIA Negative    C-reactive Protein [[id number]]  (Abnormal) Collected:  02/12/19 0019    Specimen:  Blood Updated:  02/12/19 0946     C-Reactive Protein 45.00 mg/dL     Cortisol - AM [[id number]] Collected:  02/12/19 0019    Specimen:  Blood Updated:  02/12/19 0903    Blood Gas, Arterial With Co-Ox [[id number]]  (Abnormal) Collected:  02/12/19 0714    Specimen:  Arterial Blood Updated:  02/12/19 0716     Site Right Radial     Darien's Test Positive     pH, Arterial 7.377 pH units      pCO2, Arterial 39.0 mm Hg      pO2, Arterial 80.1 mm Hg      Comment: 84 Value below reference range        HCO3, Arterial 22.9 mmol/L      Base Excess, Arterial -2.1 mmol/L      Comment: 84 Value below reference range        O2 Saturation, Arterial 96.1 %      Hemoglobin, Blood Gas 11.7 g/dL      Comment: 84 Value below reference range        Hematocrit, Blood Gas 35.7 %      Comment: 84 Value below reference  range        Oxyhemoglobin 93.9 %      Comment: 84 Value below reference range        Methemoglobin 0.70 %      Carboxyhemoglobin 1.6 %      A-a Gradiant -- mmHg      Comment: UNABLE TO CALCULATE        Barometric Pressure for Blood Gas 726 mmHg      Modality BiPap     FIO2 80 %      Ventilator Mode NA     Comment: Meter: [id number]     :  [id number]        Note --     pH, Temp Corrected -- pH Units      pCO2, Temperature Corrected -- mm Hg      pO2, Temperature Corrected -- mm Hg     Lactic Acid, Reflex [[id number]]  (Abnormal) Collected:  02/12/19 0413    Specimen:  Blood Updated:  02/12/19 0556     Lactate 2.8 mmol/L     Magnesium [[id number]]  (Abnormal) Collected:  02/12/19 0413    Specimen:  Blood Updated:  02/12/19 0556     Magnesium 1.2 mg/dL     Basic Metabolic Panel [[id number]]  (Abnormal) Collected:  02/12/19 0413    Specimen:  Blood Updated:  02/12/19 0555     Glucose 96 mg/dL      BUN 8 mg/dL      Creatinine 0.50 mg/dL      Sodium 122 mmol/L      Potassium 1.9 mmol/L      Chloride 91 mmol/L      CO2 22.0 mmol/L      Calcium 6.9 mg/dL      eGFR Non African Amer 127 mL/min/1.73      BUN/Creatinine Ratio 16.0     Anion Gap 10.9 mmol/L     Narrative:       GFR Normal >60  Chronic Kidney Disease <60  Kidney Failure <15    Troponin [[id number]]  (Normal) Collected:  02/12/19 0413    Specimen:  Blood Updated:  02/12/19 0550     Troponin I <0.012 ng/mL     Narrative:       Normal Patient Upper Reference Limit (URL) (99th Percentile)=0.03 ng/mL   Non-AMI Illness Reference Limit=0.03-0.11 ng/mL   AMI Confirmation=0.12 ng/mL and above    CBC Auto Differential [[id number]]  (Abnormal) Collected:  02/12/19 0413    Specimen:  Blood Updated:  02/12/19 0529     WBC 1.37 10*3/mm3      RBC 3.88 10*6/mm3      Hemoglobin 12.7 g/dL      Hematocrit 34.9 %      MCV 89.9 fL      MCH 32.7 pg      MCHC 36.4 g/dL      RDW 12.3 %      RDW-SD 40.4 fl      MPV 9.7 fL      Platelets 212 10*3/mm3      Neutrophil % 86.0 %       Lymphocyte % 8.8 %      Monocyte % 1.5 %      Eosinophil % 0.0 %      Basophil % 1.5 %      Immature Grans % 2.2 %      Neutrophils, Absolute 1.18 10*3/mm3      Lymphocytes, Absolute 0.12 10*3/mm3      Monocytes, Absolute 0.02 10*3/mm3      Eosinophils, Absolute 0.00 10*3/mm3      Basophils, Absolute 0.02 10*3/mm3      Immature Grans, Absolute 0.03 10*3/mm3      nRBC 0.0 /100 WBC     Scan Slide [[id number]] Collected:  02/12/19 0413    Specimen:  Blood Updated:  02/12/19 0529     RBC Morphology Normal     WBC Morphology Normal     Platelet Estimate Adequate    Lactic Acid, Reflex Timer (This will reflex a repeat order 3-3:15 hours after ordered.) [[id number]] Collected:  02/12/19 0019    Specimen:  Blood Updated:  02/12/19 0400     Extra Tube Hold for add-ons.     Comment: Auto resulted.       [place] Draw [[id number]] Collected:  02/11/19 2344    Specimen:  Blood Updated:  02/12/19 0151    Narrative:       The following orders were created for panel order [place] [facility].  Procedure                               Abnormality         Status                     ---------                               -----------         ------                     Light Blue Top[[id number]]                                                              Lavender Top[[id number]]                                     Final result               Gold Top - SST[[id number]]                                   Final result               Green Top (No Gel)[[id number]]                               Final result                 Please view results for these tests on the individual orders.    Gold Top - SST [[id number]] Collected:  02/12/19 0019    Specimen:  Blood Updated:  02/12/19 0130     Extra Tube Hold for add-ons.     Comment: Auto resulted.       Procalcitonin [[id number]]  (Abnormal) Collected:  02/12/19 0019    Specimen:  Blood Updated:  02/12/19 0124     Procalcitonin 151.29 ng/mL     Narrative:       As a Marker for Sepsis (Non-Neonates):   1. <0.5  ng/mL represents a low risk of severe sepsis and/or septic shock.  2. >2 ng/mL represents a high risk of severe sepsis and/or septic shock.    As a Marker for Lower Respiratory Tract Infections that require antibiotic therapy:    PCT on Admission     Antibiotic Therapy       6-12 Hrs later  > 0.5                Strongly Recommended             >0.25 - <0.5         Recommended  0.1 - 0.25           Discouraged              Remeasure/reassess PCT  <0.1                 Strongly Discouraged     Remeasure/reassess PCT                     PCT values of < 0.5 ng/mL do not exclude an infection, because localized infections (without systemic signs) may be associated with such low concentrations, or a systemic infection in its initial stages (< 6 hours). Furthermore, increased PCT can occur without infection. PCT concentrations between 0.5 and 2.0 ng/mL should be interpreted taking into account the patient's history. It is recommended to retest PCT within 6-24 hours if any concentrations < 2 ng/mL are obtained.    Blood Culture - Blood, Arm, Right [[id number]] Collected:  02/12/19 0059    Specimen:  Blood from Arm, Right Updated:  02/12/19 0112    Blood Culture - Blood, Arm, Left [[id number]] Collected:  02/12/19 0055    Specimen:  Blood from Arm, Left Updated:  02/12/19 0112    Lactic Acid, Plasma [[id number]]  (Abnormal) Collected:  02/12/19 0019    Specimen:  Blood Updated:  02/12/19 0047     Lactate 6.1 mmol/L     Lavender Top [[id number]] Collected:  02/11/19 2344    Specimen:  Blood Updated:  02/12/19 0045     Extra Tube hold for add-on     Comment: Auto resulted       Green Top (No Gel) [[id number]] Collected:  02/11/19 2344    Specimen:  Blood Updated:  02/12/19 0045     Extra Tube Hold for add-ons.     Comment: Auto resulted.       Comprehensive Metabolic Panel [[id number]]  (Abnormal) Collected:  02/11/19 2344    Specimen:  Blood Updated:  02/12/19 0032     Glucose 96 mg/dL      BUN 11 mg/dL      Creatinine 0.80 mg/dL       Sodium 119 mmol/L      Potassium 2.1 mmol/L      Chloride 80 mmol/L      CO2 20.0 mmol/L      Calcium 7.7 mg/dL      Total Protein 6.0 g/dL      Albumin 2.90 g/dL      ALT (SGPT) 17 U/L      AST (SGOT) 74 U/L      Alkaline Phosphatase 147 U/L      Total Bilirubin 0.7 mg/dL      eGFR Non African Amer 74 mL/min/1.73      Globulin 3.1 gm/dL      A/G Ratio 0.9 g/dL      BUN/Creatinine Ratio 13.8     Anion Gap 21.1 mmol/L     Narrative:       GFR Normal >60  Chronic Kidney Disease <60  Kidney Failure <15    BNP [[id number]]  (Abnormal) Collected:  02/11/19 2344    Specimen:  Blood Updated:  02/12/19 0032     proBNP 1,870.0 pg/mL     CBC & Differential [[id number]] Collected:  02/11/19 2344    Specimen:  Blood Updated:  02/12/19 0018    Narrative:       The following orders were created for panel order CBC & Differential.  Procedure                               Abnormality         Status                     ---------                               -----------         ------                     Scan Slide[[id number]]                                       Final result               CBC Auto Differential[[id number]]        Abnormal            Final result                 Please view results for these tests on the individual orders.    CBC Auto Differential [[id number]]  (Abnormal) Collected:  02/11/19 2344    Specimen:  Blood Updated:  02/12/19 0018     WBC 5.68 10*3/mm3      RBC 3.59 10*6/mm3      Hemoglobin 12.2 g/dL      Hematocrit 34.6 %      MCV 96.4 fL      MCH 34.0 pg      MCHC 35.3 g/dL      RDW 12.8 %      RDW-SD 45.0 fl      MPV 9.4 fL      Platelets 270 10*3/mm3      Neutrophil % 75.9 %      Lymphocyte % 19.0 %      Monocyte % 2.1 %      Eosinophil % 0.2 %      Basophil % 1.4 %      Immature Grans % 1.4 %      Neutrophils, Absolute 4.31 10*3/mm3      Lymphocytes, Absolute 1.08 10*3/mm3      Monocytes, Absolute 0.12 10*3/mm3      Eosinophils, Absolute 0.01 10*3/mm3      Basophils, Absolute 0.08 10*3/mm3      Immature  Grans, Absolute 0.08 10*3/mm3      nRBC 0.0 /100 WBC     Scan Slide [[id number]] Collected:  02/11/19 2344    Specimen:  Blood Updated:  02/12/19 0018     RBC Morphology Normal     WBC Morphology Normal     Platelet Estimate Adequate    Troponin [[id number]]  (Normal) Collected:  02/11/19 2344    Specimen:  Blood Updated:  02/12/19 0015     Troponin I <0.012 ng/mL     Narrative:       Normal Patient Upper Reference Limit (URL) (99th Percentile)=0.03 ng/mL   Non-AMI Illness Reference Limit=0.03-0.11 ng/mL   AMI Confirmation=0.12 ng/mL and above    Blood Gas, Arterial With Co-Ox [[id number]]  (Abnormal) Collected:  02/11/19 2348    Specimen:  Arterial Blood Updated:  02/11/19 2349     Site Left Radial     Darien's Test Positive     pH, Arterial 7.310 pH units      Comment: 84 Value below reference range        pCO2, Arterial 43.5 mm Hg      pO2, Arterial 60.4 mm Hg      Comment: 84 Value below reference range        HCO3, Arterial 21.9 mmol/L      Base Excess, Arterial -4.3 mmol/L      Comment: 84 Value below reference range        O2 Saturation, Arterial 88.4 %      Comment: 84 Value below reference range        Hemoglobin, Blood Gas 12.8 g/dL      Hematocrit, Blood Gas 39.3 %      Oxyhemoglobin 85.0 %      Comment: 84 Value below reference range        Methemoglobin 0.60 %      Carboxyhemoglobin 3.3 %      A-a Gradiant -- mmHg      Comment: UNABLE TO CALCULATE        Barometric Pressure for Blood Gas 730 mmHg      Modality BiPap     FIO2 50 %      Ventilator Mode BiPAP     Set [facility] Resp Rate 14.0     IPAP 16     Comment: Meter: [id number]     :  [id number]        EPAP 8     Note --     pH, Temp Corrected -- pH Units      pCO2, Temperature Corrected -- mm Hg      pO2, Temperature Corrected -- mm Hg           I have reviewed the patient's laboratory results.    Radiology results:    Imaging Results (last 24 hours)     Procedure Component Value Units Date/Time    XR Chest 1 View [[id number]] Collected:  02/12/19  0959     Updated:  02/12/19 1236    Narrative:       PROCEDURE: XR CHEST 1 VW-     HISTORY: soa     COMPARISON: December 2018.     FINDINGS: The heart is normal in size. The mediastinum is unremarkable.  There are diffuse bilateral airspace disease superimposed on chronic  changes. This may be due to edema or pneumonia. There is no  pneumothorax.             Impression:       Diffuse bilateral airspace disease superimposed on chronic  changes.     Continued follow-up is recommended.     Images were reviewed, interpreted, and dictated by Dr. Tacos Day M.D.  Transcribed by Елена Barnett PA-C.     This report was finalized on 2/12/2019 12:34 PM by Tacos Day M.D..          I have reviewed the patient's radiology reports.    Medication Review:     I have reviewed the patients active and prn medications.     Assessment:    1.  Acute metabolic encephalopathy, present on admission  2.  Acute on chronic hypoxic respiratory failure, present on admission, required bipap then intubated and placement on ventilator 2-12  3.  Acute hyponatremia, present on admission  4.  Acute severe hypokalemia, present on admission  5.  Bilateral interstitial pneumonia, present on admission, uncertain organism, prior to admission  6.  Chronic low back pain with chronic opiate dependence  7.  Chronic tobacco dependence      Plan:  Continue ICU care on continuous bipap therapy. Discussed case with Dr Perales. Zosyn, Azithromycin, Vancomycin. Follow procalcitonin.   Continue normal saline with potassium and oral potassium replacement as ordered. Dr Ibarra consulted for assistance with severe hyponatremia. We discussed the case.   Nicotine patch.     Medication risks and benefits were discussed in detail. Patient reported satisfaction with care delivered and treatment plan.   Discussed case with boyfriend at bedside also.    Radha Fields DO  02/12/19  1:03 PM

## 2019-02-12 NOTE — PROGRESS NOTES
Continued Stay Note   Christiano     Patient Name: Ce Reynolds  MRN: [id number]  Today's Date: 2/12/2019    Admit Date: 2/11/2019    Discharge Plan     Row Name 02/12/19 0958       Plan    Plan Comments  Pt not appropriate for discharge planning today, per nurseJacqui. Dr. Perales wants pt to remain on constant BIPAP the rest of the day due to stat drops when BIPAP removed. SW will follow-up with pt in morning for discharge planning. Plan for several day stay at facility.         Discharge Codes    No documentation.             TONY Franco  10:00 AM  02/12/19
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
## 2019-02-12 NOTE — CONSULTS
Adult Nutrition  Assessment/PES    Patient Name:  Ce Reynolds  YOB: 1960  MRN: [id number]  Admit Date:  2/11/2019    Assessment Date:  2/12/2019    Comments:  RD consulted for TF recs. Initiate TF once medically advised and NG tube in place. Rec #1:Begin tube feeding regimen of Nutren 2.0 @ 23mL/hr goal rate.TF to provide ~1012 kcals, ~43 grams protein, and 350 mL tube feeding water. Rec #2: Free water flushes 165 mL Q6 or four times daily. Pt receiving Propofol @ 10 mL/hr, providing ~ 242 kcals. RD to follow pt and adjust tube feeding rate based on Propofol rate and tolerance. Consult RD PRN.       Reason for Assessment     Row Name 02/12/19 1559 02/12/19 1030       Reason for Assessment    Reason For Assessment  follow-up protocol;TF/PN;nurse/nurse practitioner consult  identified at risk by screening criteria;diagnosis/disease state    Diagnosis  pulmonary disease;metabolic state COPD, Met. enceph., Acute resp. failure  pulmonary disease;metabolic state COPD, Met. enceph., Acute resp. failure    Identified At Risk by Screening Criteria  MST SCORE 2+;unintentional loss of 10 lbs or more in the past 2 mos;BMI LACE  MST SCORE 2+;unintentional loss of 10 lbs or more in the past 2 mos;BMI LACE            Labs/Tests/Procedures/Meds     Row Name 02/12/19 1032          Labs/Procedures/Meds    Lab Results Reviewed  reviewed, pertinent     Lab Results Comments  High: C-reactive protein, procalcitonin; Low: sodium, potassium, magnesium, albumin, creatinine, chloride        Medications    Pertinent Medications Reviewed  reviewed, pertinent     Pertinent Medications Comments  Methylprednisolone         Physical Findings     Row Name 02/12/19 1034          Physical Findings    Overall Physical Appearance  underweight         Estimated/Assessed Needs     Row Name 02/12/19 1034          Calculation Measurements    Weight Used For Calculations  43.6 kg (96 lb 0.9 oz) IBW        Estimated/Assessed Needs     Additional Documentation  Fluid Requirements (Group);[place]-St. Jeor Equation (Group);Protein Requirements (Group);Calorie Requirements (Group)        Calorie Requirements    Estimated Calorie Need Method  [place]-St Jeor     Estimated Calorie Requirement Comment  2157-6768 AF 1.2        KCAL/KG    14 Kcal/Kg (kcal)  609.98     15 Kcal/Kg (kcal)  653.55     18 Kcal/Kg (kcal)  784.26     20 Kcal/Kg (kcal)  871.4     25 Kcal/Kg (kcal)  1089.25     30 Kcal/Kg (kcal)  1307.1     35 Kcal/Kg (kcal)  1524.95     40 Kcal/Kg (kcal)  1742.8     45 Kcal/Kg (kcal)  1960.65     50 Kcal/Kg (kcal)  2178.5        [place]-St. Jeor Equation    RMR ([place]-St. Jeor Equation)  926.33        Protein Requirements    Est Protein Requirement Amount (gms/kg)  1.5 gm protein 52-65 gm     Estimated Protein Requirements (gms/day)  65.36        Fluid Requirements    Estimated Fluid Requirement Method  [place]-Segar Formula     [place]-Segar Method (over 20 kg)  2371.4         Nutrition Prescription Ordered     Row Name 02/12/19 1601 02/12/19 1041       Nutrition Prescription PO    Current PO Diet  NPO  Regular    Common Modifiers  --  Cardiac       Propofol Considerations    Propofol (mL/hr)  10 mL/hr  --    Propofol (Kcal/day)  242 Kcal/day  --        Evaluation of Received Nutrient/Fluid Intake     Row Name 02/12/19 1041 02/12/19 1034       Calculation Measurements    Weight Used For Calculations  --  43.6 kg (96 lb 0.9 oz) IBW       PO Evaluation    Number of Days PO Intake Evaluated  Insufficient Data  --        Evaluation of Prescribed Nutrient/Fluid Intake     Row Name 02/12/19 1034          Calculation Measurements    Weight Used For Calculations  43.6 kg (96 lb 0.9 oz) IBW         Malnutrition Severity Assessment     Row Name 02/12/19 1317 02/12/19 1041       Malnutrition Severity Assessment    Malnutrition Type  Chronic Illness Malnutrition  Chronic Illness Malnutrition       Physical Signs of Malnutrition (Chronic)    Muscle  Wasting  Mild  Mild    Fat Loss  Mild  Mild    Fluid Accumulation  Mild  None    Secondary Physical Signs  Present (comment) Pt dependent on BiPAP  Present (comment)       Weight Status (Chronic)    BMI  Mod (<17)  Mod (<17)    %IBW  Mild (<90%)  Mild (<90%)    %UBW  -- Unable to assess.   Mild (86-90%) Pt. wt. h/o shows ~91 lbs UBW    Weight Loss  Severe (>7.5% / 3 mo) ~11% in 3 months  Severe (>7.5% / 3 mo) ~11% in three months       Energy Intake Status (Chronic)    Energy Intake  Mod (<75% / > or equal to 1 mo) Per pt, poor appetite  --       Criteria Met (Must meet criteria for severity in at least 2 of these categories: M Wasting, Fat Loss, Fluid, Secondary Signs, Wt. Status, Intake)    Patient meets criteria for   Moderate malnutrition  Mild malnutrition          Problem/Interventions:  Problem 1     Row Name 02/12/19 1602 02/12/19 1049       Nutrition Diagnoses Problem 1    Problem 1  Underweight  Underweight    Etiology (related to)  Factors Affecting Nutrition  Factors Affecting Nutrition    Appetite  Poor  Poor    Signs/Symptoms (evidenced by)  BMI;% IBW;% UBW  BMI;% IBW;% UBW    BMI  16 - 16.9  16 - 16.9    Percent (%) IBW  84 %  84 %    Percent (%) UBW  89 %  89 %        Problem 2     Row Name 02/12/19 1602 02/12/19 1056       Nutrition Diagnoses Problem 2    Problem 2  Increased Nutrient Needs  Increased Nutrient Needs    Macronutrient  Kcal;Carbohydrate;Fat;Protein  Kcal;Carbohydrate;Fat;Protein    Etiology (related to)  Medical Diagnosis  Medical Diagnosis    Pulmonary/Critical Care  COPD;Acute respiratory failure  COPD;Acute respiratory failure    Signs/Symptoms (evidenced by)  Other (comment);Biochemical Pulmonary dysfunction  Other (comment);Biochemical Pulmonary dysfunction    Specific Labs Noted  Other (comment) Pro-calcitonin  Other (comment) Pro-calcitonin        Problem 3     Row Name 02/12/19 1602          Nutrition Diagnoses Problem 3    Problem 3  Needs Alternate     Etiology (related to)   Medical Diagnosis     Pulmonary/Critical Care  Ventilator     Signs/Symptoms (evidenced by)  PO diet not tolerated             Intervention Goal     Row Name 02/12/19 1604 02/12/19 1059       Intervention Goal    General  Nutrition support treatment;Meet nutritional needs for age/condition  Meet nutritional needs for age/condition    PO  Other (comment) Remain NPO while intubated  Establish PO;PO intake (%);Tolerate PO    PO Intake %  --  50 %    TF/PN  Inititiate TF/PN  --    Transition  TF to PO  --    Weight  Appropriate weight gain  Appropriate weight gain        Nutrition Intervention     Row Name 02/12/19 1604 02/12/19 1059       Nutrition Intervention    RD/Tech Action  Follow Tx progress;Encourage intake;Recommend/ordered  Follow Tx progress;Interview for preference;Encourage intake;Recommend/ordered    Recommended/Ordered  EN  Supplement        Nutrition Prescription     Row Name 02/12/19 1604 02/12/19 1100       Nutrition Prescription PO    PO Prescription  Other (comment) Continue NPO while pt intubated  Begin/change supplement    Supplement  --  Boost Plus    Supplement Frequency  --  2 times a day    New PO Prescription Ordered?  No, recommended  --       Nutrition Prescription EN    Enteral Prescription  Enteral begin/change  --    Enteral Route  NG  --    Product  Nutren 2 dmitri  --    TF Delivery Method  Continuous  --    Continuous TF Goal Rate (mL/hr)  23 mL/hr  --    Continuous TF Starting Rate (mL/hr)  23 mL/hr  --    Continuous TF Goal Volume (mL)  506 mL  --    Continuous TF Starting Volume (mL)  506 mL  --    Water flush (mL)   165 mL  --    Water Flush Frequency  Other (comment) Every 6 hours  --    New EN Prescription Ordered?  Yes  --       Other Orders    Obtain Weight  Daily  Daily    Obtain Weight Ordered?  No, recommended  No, recommended    Supplement  Vitamin mineral supplement  Vitamin mineral supplement    Supplement Ordered?  No, recommended  No, recommended         Education/Evaluation     Row Name 02/12/19 1615 02/12/19 1100       Education    Education  Education not appropriate at this time  Will Instruct as appropriate    Please explain  Patient sedation status;Patient intubated  --       Monitor/Evaluation    Monitor  Per protocol;I&O;Weight;TF delivery/tolerance;Pertinent labs  Per protocol;I&O;PO intake;Supplement intake;Weight          Electronically signed by:  Joyce Mark RD  02/12/19 4:23 PM

## 2019-02-12 NOTE — CONSULTS
[facility]      Nephrology Consultation    Referring Provider:   No ref. provider found    Reason for Consultation:  Electrolyte abnormalities.  Hyponatremia and hypokalemia and hypomagnesemia.     Subjective:    Chief complaint   Chief Complaint   Patient presents with   • Shortness of Breath       History of present illness:    Patient is 58-year-old white female with multiple medical problems as listed below in who was brought into the emergency  Department by her  because of generalized weakness and unable to walk to the car.  In the emergency department she was noted to have oxygen saturation of 69% and initial blood pressure in the 90s.  She was noted to have potassium of 2.1 and serum sodium of 119.  She also had a low magnesium.  He was admitted to the ICU was given IV fluids and she was noted to have bilateral diffuse  Infiltrates.  I was consulted for further evaluation of her electrolytes abnormalities.  I have reviewed labs/imaging/records from this hospitalization, including ER staff and admitting/attending physicians H/P's and progress notes to establish a comprehensive understanding of this patient's clinical hospital course, as well as to establish plan of care appropriately.   Past Medical History:   Diagnosis Date   • Anxiety    • Arthritis    • Body piercing     BOTH EARS   • Chest pain     STATES HAS SEEN FAMILY PHYSICIAN FOR THIS.   WAS INSTRUCTED TO GO TO E.R. IF PERSISTED.  STATES WAS APPROX 1.5 YEARS AGO.     • COPD (chronic obstructive pulmonary disease) (CMS/[facility])    • Depression    • Full dentures    • Heart disease    • History of being tatooed     X7   • [place] (hard of hearing)     RIGHT HEARING AID   • Hypertension    • Lumbar herniated disc    • Wears glasses        Past Surgical History:   Procedure Laterality Date   • APPENDECTOMY     • BACK SURGERY      fusion   • COLONOSCOPY  2013   • COLONOSCOPY N/A 9/7/2018    Procedure: COLONOSCOPY;  Surgeon:  Sherry Lake MD;  Location: [facility] ENDOSCOPY;  Service: Gastroenterology   • ENDOSCOPY N/A 4/30/2017    Procedure: ESOPHAGOGASTRODUODENOSCOPY WITH BX;  Surgeon: Sherry Lake MD;  Location: [facility] ENDOSCOPY;  Service:    • HYSTERECTOMY     • OOPHORECTOMY         Family History   Family history unknown: Yes        Social History     Tobacco Use   • Smoking status: Current Every Day Smoker     Packs/day: 1.00     Years: 35.00     Pack years: 35.00     Types: Cigarettes   • Smokeless tobacco: Never Used   Substance Use Topics   • Alcohol use: No   • Drug use: No     Medications Prior to Admission   Medication Sig Dispense Refill Last Dose   • oxyCODONE-acetaminophen (PERCOCET)  MG per tablet Take  tablets by mouth Every 6 (Six) Hours As Needed.  0 2/11/2019 at Unknown time   • albuterol (PROVENTIL HFA;VENTOLIN HFA) 108 (90 BASE) MCG/ACT inhaler Inhale 2 puffs Every 4 (Four) Hours As Needed for Wheezing. (Patient taking differently: Inhale 2 puffs Every 6 (Six) Hours As Needed for Wheezing.) 1 inhaler 0 Taking   • amLODIPine (NORVASC) 10 MG tablet Take 10 mg by mouth daily.  2 Taking   • budesonide-formoterol (SYMBICORT) 160-4.5 MCG/ACT inhaler Inhale 2 puffs by mouth 2 (Two) Times a Day. 10.2 g 5 Taking   • gabapentin (NEURONTIN) 800 MG tablet Take 800 mg by mouth Every 8 (Eight) Hours.  1 Taking   • KLOR-CON 10 MEQ CR tablet Take 10 mEq by mouth 2 (Two) Times a Day.  0 Taking   • lisinopril (PRINIVIL,ZESTRIL) 5 MG tablet    Taking   • meloxicam (MOBIC) 15 MG tablet Take 15 mg by mouth daily.  1 Taking   • methocarbamol (ROBAXIN) 500 MG tablet Take 500 mg by mouth 3 (Three) Times a Day As Needed. TAKE EVEERY 8 HRS. PRN  1 Taking   • nicotine (NICODERM CQ) 21 MG/24HR patch Place 1 patch on the skin once daily 28 patch 0 Taking   • NON FORMULARY    Taking   • NON FORMULARY    Taking   • NON FORMULARY    Taking   • potassium chloride (K-DUR,KLOR-CON) 10 MEQ CR tablet Take 10 mEq by mouth 2 (Two) Times a  "Day.   Taking   • predniSONE (DELTASONE) 10 MG tablet Take 4 tablets by mouth daily x 4 days, then take 3 daily x 3 days, then take 2 daily x 2 days, then take 1 daily x 2 days 31 tablet 0 Taking   • traMADol (ULTRAM) 50 MG tablet Take 50 mg by mouth Every 8 (Eight) Hours As Needed.  1 Taking     Allergies:  Flexeril [cyclobenzaprine]; Multi-vitamins [anti-oxidant]; and Zanaflex [tizanidine]    Review of Systems  1. Constitutional: Negative for fever. Negative for chills.  Denies any diaphoresis, positive forfatigue and unexpected weight change.   2. HENT: Negative for congestion and hearing loss.   3. Eyes: Negative for redness and visual disturbance.   4. Respiratory: positive for shortness of breath or cough. Negative for chest pain  5. Cardiovascular: Negative for chest pain and chest tightness or palpitations.   6. Gastrointestinal: Negative for abdominal pain or distention, and blood in stool. Denies any Nausea, vomiting, diarrhea or constipation.  7. Endocrine: Negative for heat or cold intolerance.   8. Genitourinary: Negative for difficulty urinating, dysuria and frequency.   9. Musculoskeletal: Negative for arthralgias, back pain.  Denies any myalgias.   10. Skin: Negative for color change, rash and wound.   11. Neurological: Negative for syncope, weakness and headaches.   12. Hematological: Negative for adenopathy. Does not bruise/bleed easily.   13. Psychiatric/Behavioral: Negative for confusion. The patient is not nervous/anxious.     Objective:    Vital Signs  BP 90/71   Pulse 111   Temp 97.9 °F (36.6 °C) (Oral)   Resp 28   Ht 149.9 cm (59\")   Wt 36.9 kg (81 lb 6.4 oz)   SpO2 95%   BMI 16.44 kg/m²          No intake/output data recorded.    Intake/Output Summary (Last 24 hours) at 2/12/2019 0936  Last data filed at 2/12/2019 0452  Gross per 24 hour   Intake 1325 ml   Output --   Net 1325 ml       Physical Exam:     General Appearance:   Alert, cooperative, in no acute distress.     Head:   " Normocephalic, without obvious abnormality, atraumatic.     Eyes:      Normal, conjunctivae and sclerae, no icterus, no pallor, corneas clear, PERRLA        Throat:   Oral mucosa dry      Neck:  No adenopathy, supple, trachea midline, no thyromegaly, no carotid bruit, no JVD      Back:   No CVA tenderness on Percussion.     Lungs:    Clear to auscultation and fair air movement noted.she does have fine crackles all over the chest      Heart::   Regular rhythm and normal rate, normal S1 and S2.       Abdomen:   Obese. Normal bowel sounds, no masses, no organomegaly, soft non-tender, non-distended, no guarding, no rebound tenderness      Genital urinary:   No urinary bladder palpable      Extremities:  Moves all extremities, no edema, no cyanosis, no redness.     Pulses:  Pulses palpable and equal bilaterally but weak.     Skin:  No bleeding, bruising or rash        Neurologic:  Cranial nerves grossly intact, move all extremities             Lab Results (last 7 days)     Procedure Component Value Units Date/Time    C-reactive Protein [[id number]] Collected:  02/12/19 0019    Specimen:  Blood Updated:  02/12/19 0903    Cortisol - AM [[id number]] Collected:  02/12/19 0019    Specimen:  Blood Updated:  02/12/19 0903    Blood Gas, Arterial With Co-Ox [[id number]]  (Abnormal) Collected:  02/12/19 0714    Specimen:  Arterial Blood Updated:  02/12/19 0716     Site Right Radial     Darien's Test Positive     pH, Arterial 7.377 pH units      pCO2, Arterial 39.0 mm Hg      pO2, Arterial 80.1 mm Hg      Comment: 84 Value below reference range        HCO3, Arterial 22.9 mmol/L      Base Excess, Arterial -2.1 mmol/L      Comment: 84 Value below reference range        O2 Saturation, Arterial 96.1 %      Hemoglobin, Blood Gas 11.7 g/dL      Comment: 84 Value below reference range        Hematocrit, Blood Gas 35.7 %      Comment: 84 Value below reference range        Oxyhemoglobin 93.9 %      Comment: 84 Value below reference range         Methemoglobin 0.70 %      Carboxyhemoglobin 1.6 %      A-a Gradiant -- mmHg      Comment: UNABLE TO CALCULATE        Barometric Pressure for Blood Gas 726 mmHg      Modality BiPap     FIO2 80 %      Ventilator Mode NA     Comment: Meter: [id number]     :  [id number]        Note --     pH, Temp Corrected -- pH Units      pCO2, Temperature Corrected -- mm Hg      pO2, Temperature Corrected -- mm Hg     Lactic Acid, Reflex [[id number]]  (Abnormal) Collected:  02/12/19 0413    Specimen:  Blood Updated:  02/12/19 0556     Lactate 2.8 mmol/L     Magnesium [[id number]]  (Abnormal) Collected:  02/12/19 0413    Specimen:  Blood Updated:  02/12/19 0556     Magnesium 1.2 mg/dL     Basic Metabolic Panel [[id number]]  (Abnormal) Collected:  02/12/19 0413    Specimen:  Blood Updated:  02/12/19 0555     Glucose 96 mg/dL      BUN 8 mg/dL      Creatinine 0.50 mg/dL      Sodium 122 mmol/L      Potassium 1.9 mmol/L      Chloride 91 mmol/L      CO2 22.0 mmol/L      Calcium 6.9 mg/dL      eGFR Non African Amer 127 mL/min/1.73      BUN/Creatinine Ratio 16.0     Anion Gap 10.9 mmol/L     Narrative:       GFR Normal >60  Chronic Kidney Disease <60  Kidney Failure <15    Troponin [[id number]]  (Normal) Collected:  02/12/19 0413    Specimen:  Blood Updated:  02/12/19 0550     Troponin I <0.012 ng/mL     Narrative:       Normal Patient Upper Reference Limit (URL) (99th Percentile)=0.03 ng/mL   Non-AMI Illness Reference Limit=0.03-0.11 ng/mL   AMI Confirmation=0.12 ng/mL and above    CBC Auto Differential [[id number]]  (Abnormal) Collected:  02/12/19 0413    Specimen:  Blood Updated:  02/12/19 0529     WBC 1.37 10*3/mm3      RBC 3.88 10*6/mm3      Hemoglobin 12.7 g/dL      Hematocrit 34.9 %      MCV 89.9 fL      MCH 32.7 pg      MCHC 36.4 g/dL      RDW 12.3 %      RDW-SD 40.4 fl      MPV 9.7 fL      Platelets 212 10*3/mm3      Neutrophil % 86.0 %      Lymphocyte % 8.8 %      Monocyte % 1.5 %      Eosinophil % 0.0 %      Basophil % 1.5 %       Immature Grans % 2.2 %      Neutrophils, Absolute 1.18 10*3/mm3      Lymphocytes, Absolute 0.12 10*3/mm3      Monocytes, Absolute 0.02 10*3/mm3      Eosinophils, Absolute 0.00 10*3/mm3      Basophils, Absolute 0.02 10*3/mm3      Immature Grans, Absolute 0.03 10*3/mm3      nRBC 0.0 /100 WBC     Scan Slide [[id number]] Collected:  02/12/19 0413    Specimen:  Blood Updated:  02/12/19 0529     RBC Morphology Normal     WBC Morphology Normal     Platelet Estimate Adequate    Lactic Acid, Reflex Timer (This will reflex a repeat order 3-3:15 hours after ordered.) [[id number]] Collected:  02/12/19 0019    Specimen:  Blood Updated:  02/12/19 0400     Extra Tube Hold for add-ons.     Comment: Auto resulted.       [place] Draw [[id number]] Collected:  02/11/19 2344    Specimen:  Blood Updated:  02/12/19 0151    Narrative:       The following orders were created for panel order [place] Draw.  Procedure                               Abnormality         Status                     ---------                               -----------         ------                     Light Blue Top[[id number]]                                                              Lavender Top[[id number]]                                     Final result               Gold Top - SST[[id number]]                                   Final result               Green Top (No Gel)[[id number]]                               Final result                 Please view results for these tests on the individual orders.    Gold Top - SST [[id number]] Collected:  02/12/19 0019    Specimen:  Blood Updated:  02/12/19 0130     Extra Tube Hold for add-ons.     Comment: Auto resulted.       Procalcitonin [[id number]]  (Abnormal) Collected:  02/12/19 0019    Specimen:  Blood Updated:  02/12/19 0124     Procalcitonin 151.29 ng/mL     Narrative:       As a Marker for Sepsis (Non-Neonates):   1. <0.5 ng/mL represents a low risk of severe sepsis and/or septic shock.  2. >2 ng/mL represents a  high risk of severe sepsis and/or septic shock.    As a Marker for Lower Respiratory Tract Infections that require antibiotic therapy:    PCT on Admission     Antibiotic Therapy       6-12 Hrs later  > 0.5                Strongly Recommended             >0.25 - <0.5         Recommended  0.1 - 0.25           Discouraged              Remeasure/reassess PCT  <0.1                 Strongly Discouraged     Remeasure/reassess PCT                     PCT values of < 0.5 ng/mL do not exclude an infection, because localized infections (without systemic signs) may be associated with such low concentrations, or a systemic infection in its initial stages (< 6 hours). Furthermore, increased PCT can occur without infection. PCT concentrations between 0.5 and 2.0 ng/mL should be interpreted taking into account the patient's history. It is recommended to retest PCT within 6-24 hours if any concentrations < 2 ng/mL are obtained.    Blood Culture - Blood, Arm, Right [[id number]] Collected:  02/12/19 0059    Specimen:  Blood from Arm, Right Updated:  02/12/19 0112    Blood Culture - Blood, Arm, Left [[id number]] Collected:  02/12/19 0055    Specimen:  Blood from Arm, Left Updated:  02/12/19 0112    Lactic Acid, Plasma [[id number]]  (Abnormal) Collected:  02/12/19 0019    Specimen:  Blood Updated:  02/12/19 0047     Lactate 6.1 mmol/L     Lavender Top [[id number]] Collected:  02/11/19 2344    Specimen:  Blood Updated:  02/12/19 0045     Extra Tube hold for add-on     Comment: Auto resulted       Green Top (No Gel) [[id number]] Collected:  02/11/19 2344    Specimen:  Blood Updated:  02/12/19 0045     Extra Tube Hold for add-ons.     Comment: Auto resulted.       Comprehensive Metabolic Panel [[id number]]  (Abnormal) Collected:  02/11/19 2344    Specimen:  Blood Updated:  02/12/19 0032     Glucose 96 mg/dL      BUN 11 mg/dL      Creatinine 0.80 mg/dL      Sodium 119 mmol/L      Potassium 2.1 mmol/L      Chloride 80 mmol/L      CO2 20.0 mmol/L       Calcium 7.7 mg/dL      Total Protein 6.0 g/dL      Albumin 2.90 g/dL      ALT (SGPT) 17 U/L      AST (SGOT) 74 U/L      Alkaline Phosphatase 147 U/L      Total Bilirubin 0.7 mg/dL      eGFR Non African Amer 74 mL/min/1.73      Globulin 3.1 gm/dL      A/G Ratio 0.9 g/dL      BUN/Creatinine Ratio 13.8     Anion Gap 21.1 mmol/L     Narrative:       GFR Normal >60  Chronic Kidney Disease <60  Kidney Failure <15    BNP [[id number]]  (Abnormal) Collected:  02/11/19 2344    Specimen:  Blood Updated:  02/12/19 0032     proBNP 1,870.0 pg/mL     CBC & Differential [[id number]] Collected:  02/11/19 2344    Specimen:  Blood Updated:  02/12/19 0018    Narrative:       The following orders were created for panel order CBC & Differential.  Procedure                               Abnormality         Status                     ---------                               -----------         ------                     Scan Slide[[id number]]                                       Final result               CBC Auto Differential[[id number]]        Abnormal            Final result                 Please view results for these tests on the individual orders.    CBC Auto Differential [[id number]]  (Abnormal) Collected:  02/11/19 2344    Specimen:  Blood Updated:  02/12/19 0018     WBC 5.68 10*3/mm3      RBC 3.59 10*6/mm3      Hemoglobin 12.2 g/dL      Hematocrit 34.6 %      MCV 96.4 fL      MCH 34.0 pg      MCHC 35.3 g/dL      RDW 12.8 %      RDW-SD 45.0 fl      MPV 9.4 fL      Platelets 270 10*3/mm3      Neutrophil % 75.9 %      Lymphocyte % 19.0 %      Monocyte % 2.1 %      Eosinophil % 0.2 %      Basophil % 1.4 %      Immature Grans % 1.4 %      Neutrophils, Absolute 4.31 10*3/mm3      Lymphocytes, Absolute 1.08 10*3/mm3      Monocytes, Absolute 0.12 10*3/mm3      Eosinophils, Absolute 0.01 10*3/mm3      Basophils, Absolute 0.08 10*3/mm3      Immature Grans, Absolute 0.08 10*3/mm3      nRBC 0.0 /100 WBC     Scan Slide [[id number]] Collected:   02/11/19 2344    Specimen:  Blood Updated:  02/12/19 0018     RBC Morphology Normal     WBC Morphology Normal     Platelet Estimate Adequate    Troponin [[id number]]  (Normal) Collected:  02/11/19 2344    Specimen:  Blood Updated:  02/12/19 0015     Troponin I <0.012 ng/mL     Narrative:       Normal Patient Upper Reference Limit (URL) (99th Percentile)=0.03 ng/mL   Non-AMI Illness Reference Limit=0.03-0.11 ng/mL   AMI Confirmation=0.12 ng/mL and above    Blood Gas, Arterial With Co-Ox [[id number]]  (Abnormal) Collected:  02/11/19 2348    Specimen:  Arterial Blood Updated:  02/11/19 2349     Site Left Radial     Darien's Test Positive     pH, Arterial 7.310 pH units      Comment: 84 Value below reference range        pCO2, Arterial 43.5 mm Hg      pO2, Arterial 60.4 mm Hg      Comment: 84 Value below reference range        HCO3, Arterial 21.9 mmol/L      Base Excess, Arterial -4.3 mmol/L      Comment: 84 Value below reference range        O2 Saturation, Arterial 88.4 %      Comment: 84 Value below reference range        Hemoglobin, Blood Gas 12.8 g/dL      Hematocrit, Blood Gas 39.3 %      Oxyhemoglobin 85.0 %      Comment: 84 Value below reference range        Methemoglobin 0.60 %      Carboxyhemoglobin 3.3 %      A-a Gradiant -- mmHg      Comment: UNABLE TO CALCULATE        Barometric Pressure for Blood Gas 730 mmHg      Modality BiPap     FIO2 50 %      Ventilator Mode BiPAP     Set Mech Resp Rate 14.0     IPAP 16     Comment: Meter: [id number]     :  [id number]        EPAP 8     Note --     pH, Temp Corrected -- pH Units      pCO2, Temperature Corrected -- mm Hg      pO2, Temperature Corrected -- mm Hg         Imaging Results (last 72 hours)     Procedure Component Value Units Date/Time    XR Chest 1 View [[id number]] Resulted:  02/12/19 0033     Updated:  02/12/19 0033              azithromycin 500 mg Intravenous Q24H   budesonide 0.5 mg Nebulization BID - RT   enoxaparin 40 mg Subcutaneous Q24H    gabapentin 400 mg Oral Q8H   guaiFENesin 600 mg Oral Q12H   ipratropium-albuterol 3 mL Nebulization Q6H - RT   lactobacillus acidophilus 1 capsule Oral BID   magnesium oxide 400 mg Oral BID   magnesium sulfate 1 g Intravenous Once   methylPREDNISolone sodium succinate 80 mg Intravenous Q8H   nicotine 1 patch Transdermal Q24H   piperacillin-tazobactam 4.5 g Intravenous Q8H   potassium chloride 20 mEq Oral Q1H   sodium chloride 3 mL Intravenous Q12H   [START ON 2/13/2019] vancomycin 500 mg Intravenous Q24H       Pharmacy Consult     sodium chloride 0.9 % with KCl 20 mEq 100 mL/hr Last Rate: 100 mL/hr (02/12/19 0350)   [START ON 2/14/2019] Pharmacy Consult         Assessment/Plan:        1.   Hyponatremia  2.   Hypokalemia  3.   Acute on chronic respiratory failure with hypoxia (CMS/HCC)  4.   COPD exacerbation (CMS/HCC)  5.   Pneumonia of both lungs due to infectious organism  6.   Acute metabolic encephalopathy  7.   Tobacco use  8.   Chronic low back pain  9.   Hard of hearing    Plan:  · During my discussion with her she did mention that she has multiple family members who had low potassium and magnesium, she may have Bartter syndrome or Gettleman syndrome.  She will need further workup.  · Potassium replacement has been ordered and I will recheck it around 4 PM.  · Continue with the normal saline likely will help with the serum sodium.  · IV magnesium has been given will go ahead and start the oral magnesium as well.  · If he deteriorates she may end up intubated for respiratory support.  · Surveillance labs.  · Details were discussed with the patient no family in the room.    · Details were also discussed with the hospitalist service.   · Further recommendations will depend on clinical course of the patient during the current hospitalization.    · I also discussed the details with the nursing staff.  · Rest as ordered.    In closing, I sincerely appreciate opportunity to participate in care of this patient. If  I can be of any further assistance with the management of this patient, please don’t hesitate to contact me.    Ben Ibarra MD, GLENNN  02/12/19  9:36 AM    Dictated using Dragon.

## 2019-02-12 NOTE — PAYOR COMM NOTE
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  96  --     Heart Rate Source  --  --  Monitor  --     Resp  --  --  23  --     Resp Rate Source  --  --  Visual  --     BP  --  107/73  107/79  94/52     Noninvasive MAP (mmHg)  --  85  --  --        Oxygen Therapy    SpO2  --  99 %  69 %  (Abnormal)   --         Hospital Medications (active)       Dose Frequency Start End    acetaminophen (TYLENOL) tablet 650 mg 650 mg Every 4 Hours PRN 2/12/2019     Sig - Route: Take 2 tablets by mouth Every 4 (Four) Hours As Needed for Mild Pain . - Oral    AZITHROMYCIN 500 MG/250 ML 0.9% NS IVPB (vial-mate) 500 mg Every 24 Hours 2/12/2019 2/17/2019    Sig - Route: Infuse 500 mg into a venous catheter Daily. - Intravenous    budesonide (PULMICORT) nebulizer solution 0.5 mg 0.5 mg 2 Times Daily - RT 2/12/2019     Sig - Route: Take 2 mL by nebulization 2 (Two) Times a Day. - Nebulization    enoxaparin (LOVENOX) syringe 40 mg 40 mg Every 24 Hours 2/12/2019     Sig - Route: Inject 0.4 mL under the skin into the appropriate area as directed Daily. - Subcutaneous    gabapentin (NEURONTIN) capsule 400 mg 400 mg Every 8  Hours Scheduled 2/12/2019     Sig - Route: Take 1 capsule by mouth Every 8 (Eight) Hours. - Oral    guaiFENesin (MUCINEX) 12 hr tablet 600 mg 600 mg Every 12 Hours Scheduled 2/12/2019     Sig - Route: Take 1 tablet by mouth Every 12 (Twelve) Hours. - Oral    ipratropium-albuterol (DUO-NEB) nebulizer solution 3 mL 3 mL Once 2/11/2019 2/11/2019    Sig - Route: Take 3 mL by nebulization 1 (One) Time. - Nebulization    Cosign for Ordering: Accepted by Chinedu Marley MD on 2/11/2019 11:35 PM    ipratropium-albuterol (DUO-NEB) nebulizer solution 3 mL 3 mL Every 4 Hours PRN 2/12/2019     Sig - Route: Take 3 mL by nebulization Every 4 (Four) Hours As Needed for Shortness of Air. - Nebulization    ipratropium-albuterol (DUO-NEB) nebulizer solution 3 mL 3 mL Every 6 Hours - RT 2/12/2019     Sig - Route: Take 3 mL by nebulization Every 6 (Six) Hours. - Nebulization    lactobacillus acidophilus (RISAQUAD) capsule 1 capsule 1 capsule 2 Times Daily 2/12/2019     Sig - Route: Take 1 capsule by mouth 2 (Two) Times a Day. - Oral    magnesium oxide (MAGOX) tablet 400 mg 400 mg 2 Times Daily 2/12/2019     Sig - Route: Take 1 tablet by mouth 2 (Two) Times a Day. - Oral    magnesium sulfate in D5W 1g/100mL (PREMIX) 1 g Once 2/12/2019 2/12/2019    Sig - Route: Infuse 100 mL into a venous catheter 1 (One) Time. - Intravenous    magnesium sulfate in D5W 1g/100mL (PREMIX) 1 g Once 2/12/2019 2/12/2019    Sig - Route: Infuse 100 mL into a venous catheter 1 (One) Time. - Intravenous    methylPREDNISolone sodium succinate (SOLU-Medrol) injection 125 mg 125 mg Once 2/11/2019 2/12/2019    Sig - Route: Infuse 2 mL into a venous catheter 1 (One) Time. - Intravenous    Cosign for Ordering: Accepted by Chinedu Marley MD on 2/11/2019 11:35 PM    methylPREDNISolone sodium succinate (SOLU-Medrol) injection 80 mg 80 mg Every 8 Hours 2/12/2019     Sig - Route: Infuse 1.28 mL into a venous catheter Every 8 (Eight) Hours. - Intravenous    naloxone  (NARCAN) injection 0.4 mg 0.4 mg Once 2/11/2019 2/12/2019    Sig - Route: Infuse 1 mL into a venous catheter 1 (One) Time. - Intravenous    Cosign for Ordering: Accepted by Chinedu Marley MD on 2/12/2019 12:27 AM    nicotine (NICODERM CQ) 21 MG/24HR patch 1 patch 1 patch Every 24 Hours Scheduled 2/12/2019     Sig - Route: Place 1 patch on the skin as directed by provider Daily. - Transdermal    ondansetron (ZOFRAN) injection 4 mg 4 mg Every 6 Hours PRN 2/12/2019     Sig - Route: Infuse 2 mL into a venous catheter Every 6 (Six) Hours As Needed for Nausea or Vomiting. - Intravenous    oxyCODONE-acetaminophen (PERCOCET)  MG per tablet 1 tablet 1 tablet Every 6 Hours PRN 2/12/2019     Sig - Route: Take 1 tablet by mouth Every 6 (Six) Hours As Needed for Moderate Pain . - Oral    Pharmacy Consult  Continuous PRN 2/12/2019     Sig - Route: Continuous As Needed for Consult. - Does not apply    piperacillin-tazobactam (ZOSYN) 3.375 g in iso-osmotic dextrose 50 ml (premix) 3.375 g Once 2/12/2019 2/12/2019    Sig - Route: Infuse 50 mL into a venous catheter 1 (One) Time. - Intravenous    Cosign for Ordering: Accepted by Chinedu Marley MD on 2/12/2019  1:49 AM    piperacillin-tazobactam (ZOSYN) 4.5 g in iso-osmotic dextrose 100 mL IVPB (premix) 4.5 g Every 8 Hours 2/12/2019 2/19/2019    Sig - Route: Infuse 100 mL into a venous catheter Every 8 (Eight) Hours. - Intravenous    potassium chloride (KLOR-CON) packet 20 mEq 20 mEq Every 1 Hour 2/12/2019 2/12/2019    Sig - Route: Take 20 mEq by mouth Every 1 (One) Hour. - Oral    potassium chloride (MICRO-K) CR capsule 40 mEq 40 mEq Once 2/12/2019 2/12/2019    Sig - Route: Take 4 capsules by mouth 1 (One) Time. - Oral    potassium chloride 10 mEq in 100 mL IVPB 10 mEq Every 1 Hour 2/12/2019 2/12/2019    Sig - Route: Infuse 100 mL into a venous catheter Every 1 (One) Hour. - Intravenous    sodium chloride 0.9 % bolus 1,500 mL 1,500 mL Once 2/12/2019 2/12/2019    Sig -  Route: Infuse 1,500 mL into a venous catheter 1 (One) Time. - Intravenous    Cosign for Ordering: Accepted by Chinedu Marley MD on 2/12/2019  1:49 AM    sodium chloride 0.9 % flush 10 mL 10 mL As Needed 2/11/2019     Sig - Route: Infuse 10 mL into a venous catheter As Needed for Line Care. - Intravenous    Cosign for Ordering: Accepted by Chinedu Marley MD on 2/11/2019 11:35 PM    sodium chloride 0.9 % flush 3 mL 3 mL Every 12 Hours Scheduled 2/12/2019     Sig - Route: Infuse 3 mL into a venous catheter Every 12 (Twelve) Hours. - Intravenous    sodium chloride 0.9 % flush 3-10 mL 3-10 mL As Needed 2/12/2019     Sig - Route: Infuse 3-10 mL into a venous catheter As Needed for Line Care. - Intravenous    sodium chloride 0.9 % with KCl 20 mEq/L infusion 100 mL/hr Continuous 2/12/2019     Sig - Route: Infuse 100 mL/hr into a venous catheter Continuous. - Intravenous    sodium chloride 3 % nebulizer solution 4 mL 4 mL Once 2/12/2019 2/12/2019    Sig - Route: Take 4 mL by nebulization 1 (One) Time. - Nebulization    tobramycin (NEBCIN) 250 mg in sodium chloride 0.9 % IVPB 250 mg Once 2/12/2019 2/12/2019    Sig - Route: Infuse 250 mg into a venous catheter 1 (One) Time. - Intravenous    Cosign for Ordering: Accepted by Chinedu Marley MD on 2/12/2019  1:49 AM    vancomycin (VANCOCIN) IVPB 500 mg in 100 mL  mg Every 24 Hours 2/13/2019 2/20/2019    Sig - Route: Infuse 500 mg into a venous catheter Daily. - Intravenous    vancomycin in dextrose 5% 150 mL (VANCOCIN) IVPB 750 mg 750 mg Once 2/12/2019 2/12/2019    Sig - Route: Infuse 150 mL into a venous catheter 1 (One) Time. - Intravenous    Cosign for Ordering: Accepted by Chinedu Marley MD on 2/12/2019  1:49 AM    vancomycin level 02/14 0230 hold next dose if level >20  Continuous PRN 2/14/2019     Sig - Route: Continuous As Needed for Consult. - Does not apply    amLODIPine (NORVASC) tablet 10 mg (Discontinued) 10 mg Daily 2/12/2019 2/12/2019    Sig  - Route: Take 2 tablets by mouth Daily. - Oral    lisinopril (PRINIVIL,ZESTRIL) tablet 5 mg (Discontinued) 5 mg Every 24 Hours Scheduled 2/12/2019 2/12/2019    Sig - Route: Take 1 tablet by mouth Daily. - Oral    potassium chloride (MICRO-K) CR capsule 20 mEq (Discontinued) 20 mEq 2 Times Daily With Meals 2/12/2019 2/12/2019    Sig - Route: Take 2 capsules by mouth 2 (Two) Times a Day With Meals. - Oral    potassium chloride 10 mEq in 100 mL IVPB (Discontinued) 10 mEq Once 2/12/2019 2/12/2019    Sig - Route: Infuse 100 mL into a venous catheter 1 (One) Time. - Intravenous    Cosign for Ordering: Accepted by Chinedu Marley MD on 2/12/2019  1:49 AM    potassium chloride 10 mEq in 100 mL IVPB (Discontinued) 10 mEq Once 2/12/2019 2/12/2019    Sig - Route: Infuse 100 mL into a venous catheter 1 (One) Time. - Intravenous    Cosign for Ordering: Accepted by Chinedu Marley MD on 2/12/2019  1:49 AM    potassium chloride 10 mEq in 100 mL IVPB (Discontinued) 10 mEq Once 2/12/2019 2/12/2019    Sig - Route: Infuse 100 mL into a venous catheter 1 (One) Time. - Intravenous    Cosign for Ordering: Accepted by Chinedu Marley MD on 2/12/2019  1:49 AM    potassium chloride 10 mEq in 100 mL IVPB (Discontinued) 10 mEq Once 2/12/2019 2/12/2019    Sig - Route: Infuse 100 mL into a venous catheter 1 (One) Time. - Intravenous    Cosign for Ordering: Accepted by Chinedu Marley MD on 2/12/2019  1:49 AM    potassium chloride 10 mEq in 100 mL IVPB (Discontinued) 10 mEq Every 1 Hour 2/12/2019 2/12/2019    Sig - Route: Infuse 100 mL into a venous catheter Every 1 (One) Hour. - Intravenous            Lab Results (last 24 hours)     Procedure Component Value Units Date/Time    Urine Drug Screen - Urine, Clean Catch [[id number]]  (Abnormal) Collected:  02/12/19 1044    Specimen:  Urine, Clean Catch Updated:  02/12/19 1109     THC, Screen, Urine Negative     Phencyclidine (PCP), Urine Negative     Cocaine Screen, Urine Negative      Methamphetamine, Urine Negative     Opiate Screen Negative     Amphetamine Screen, Urine Negative     Benzodiazepine Screen, Urine Negative     Tricyclic Antidepressants Screen Negative     Methadone Screen, Urine Negative     Barbiturates Screen, Urine Negative     Oxycodone Screen, Urine Positive     Propoxyphene Screen Negative     Buprenorphine, Screen, Urine Negative    Narrative:       Limitations of this procedure include the possibility of false positives due to interfering substances in the urine sample. Clinical data should be correlated with any questionable result. Positive results should be considered Presumptive Positive until results are confirmed with another methodology such as HPLC or GCMS.    Sedimentation Rate [[id number]] Collected:  02/12/19 1045    Specimen:  Blood Updated:  02/12/19 1050    Mycoplasma Pneumoniae Antibody, IgM [[id number]] Collected:  02/12/19 1045    Specimen:  Blood Updated:  02/12/19 1050    Influenza Antigen, Rapid - Swab, Nasopharynx [[id number]]  (Normal) Collected:  02/12/19 0939    Specimen:  Swab from Nasopharynx Updated:  02/12/19 0957     Influenza A Ag, EIA Negative     Influenza B Ag, EIA Negative    C-reactive Protein [[id number]]  (Abnormal) Collected:  02/12/19 0019    Specimen:  Blood Updated:  02/12/19 0946     C-Reactive Protein 45.00 mg/dL     Cortisol - AM [[id number]] Collected:  02/12/19 0019    Specimen:  Blood Updated:  02/12/19 0903    Blood Gas, Arterial With Co-Ox [[id number]]  (Abnormal) Collected:  02/12/19 0714    Specimen:  Arterial Blood Updated:  02/12/19 0716     Site Right Radial     Darien's Test Positive     pH, Arterial 7.377 pH units      pCO2, Arterial 39.0 mm Hg      pO2, Arterial 80.1 mm Hg      Comment: 84 Value below reference range        HCO3, Arterial 22.9 mmol/L      Base Excess, Arterial -2.1 mmol/L      Comment: 84 Value below reference range        O2 Saturation, Arterial 96.1 %      Hemoglobin, Blood Gas 11.7 g/dL      Comment: 84  Value below reference range        Hematocrit, Blood Gas 35.7 %      Comment: 84 Value below reference range        Oxyhemoglobin 93.9 %      Comment: 84 Value below reference range        Methemoglobin 0.70 %      Carboxyhemoglobin 1.6 %      A-a Gradiant -- mmHg      Comment: UNABLE TO CALCULATE        Barometric Pressure for Blood Gas 726 mmHg      Modality BiPap     FIO2 80 %      Ventilator Mode NA     Comment: Meter: [id number]     :  [id number]        Note --     pH, Temp Corrected -- pH Units      pCO2, Temperature Corrected -- mm Hg      pO2, Temperature Corrected -- mm Hg     Lactic Acid, Reflex [[id number]]  (Abnormal) Collected:  02/12/19 0413    Specimen:  Blood Updated:  02/12/19 0556     Lactate 2.8 mmol/L     Magnesium [[id number]]  (Abnormal) Collected:  02/12/19 0413    Specimen:  Blood Updated:  02/12/19 0556     Magnesium 1.2 mg/dL     Basic Metabolic Panel [[id number]]  (Abnormal) Collected:  02/12/19 0413    Specimen:  Blood Updated:  02/12/19 0555     Glucose 96 mg/dL      BUN 8 mg/dL      Creatinine 0.50 mg/dL      Sodium 122 mmol/L      Potassium 1.9 mmol/L      Chloride 91 mmol/L      CO2 22.0 mmol/L      Calcium 6.9 mg/dL      eGFR Non African Amer 127 mL/min/1.73      BUN/Creatinine Ratio 16.0     Anion Gap 10.9 mmol/L     Narrative:       GFR Normal >60  Chronic Kidney Disease <60  Kidney Failure <15    Troponin [[id number]]  (Normal) Collected:  02/12/19 0413    Specimen:  Blood Updated:  02/12/19 0550     Troponin I <0.012 ng/mL     Narrative:       Normal Patient Upper Reference Limit (URL) (99th Percentile)=0.03 ng/mL   Non-AMI Illness Reference Limit=0.03-0.11 ng/mL   AMI Confirmation=0.12 ng/mL and above    CBC Auto Differential [[id number]]  (Abnormal) Collected:  02/12/19 0413    Specimen:  Blood Updated:  02/12/19 0529     WBC 1.37 10*3/mm3      RBC 3.88 10*6/mm3      Hemoglobin 12.7 g/dL      Hematocrit 34.9 %      MCV 89.9 fL      MCH 32.7 pg      MCHC 36.4 g/dL       RDW 12.3 %      RDW-SD 40.4 fl      MPV 9.7 fL      Platelets 212 10*3/mm3      Neutrophil % 86.0 %      Lymphocyte % 8.8 %      Monocyte % 1.5 %      Eosinophil % 0.0 %      Basophil % 1.5 %      Immature Grans % 2.2 %      Neutrophils, Absolute 1.18 10*3/mm3      Lymphocytes, Absolute 0.12 10*3/mm3      Monocytes, Absolute 0.02 10*3/mm3      Eosinophils, Absolute 0.00 10*3/mm3      Basophils, Absolute 0.02 10*3/mm3      Immature Grans, Absolute 0.03 10*3/mm3      nRBC 0.0 /100 WBC     Scan Slide [[id number]] Collected:  02/12/19 0413    Specimen:  Blood Updated:  02/12/19 0529     RBC Morphology Normal     WBC Morphology Normal     Platelet Estimate Adequate    Lactic Acid, Reflex Timer (This will reflex a repeat order 3-3:15 hours after ordered.) [[id number]] Collected:  02/12/19 0019    Specimen:  Blood Updated:  02/12/19 0400     Extra Tube Hold for add-ons.     Comment: Auto resulted.       [place] Draw [[id number]] Collected:  02/11/19 2344    Specimen:  Blood Updated:  02/12/19 0151    Narrative:       The following orders were created for panel order [place] Draw.  Procedure                               Abnormality         Status                     ---------                               -----------         ------                     Light Blue Top[[id number]]                                                              Lavender Top[[id number]]                                     Final result               Gold Top - SST[[id number]]                                   Final result               Green Top (No Gel)[[id number]]                               Final result                 Please view results for these tests on the individual orders.    Gold Top - SST [[id number]] Collected:  02/12/19 0019    Specimen:  Blood Updated:  02/12/19 0130     Extra Tube Hold for add-ons.     Comment: Auto resulted.       Procalcitonin [[id number]]  (Abnormal) Collected:  02/12/19 0019    Specimen:  Blood Updated:  02/12/19 0124      Procalcitonin 151.29 ng/mL     Narrative:       As a Marker for Sepsis (Non-Neonates):   1. <0.5 ng/mL represents a low risk of severe sepsis and/or septic shock.  2. >2 ng/mL represents a high risk of severe sepsis and/or septic shock.    As a Marker for Lower Respiratory Tract Infections that require antibiotic therapy:    PCT on Admission     Antibiotic Therapy       6-12 Hrs later  > 0.5                Strongly Recommended             >0.25 - <0.5         Recommended  0.1 - 0.25           Discouraged              Remeasure/reassess PCT  <0.1                 Strongly Discouraged     Remeasure/reassess PCT                     PCT values of < 0.5 ng/mL do not exclude an infection, because localized infections (without systemic signs) may be associated with such low concentrations, or a systemic infection in its initial stages (< 6 hours). Furthermore, increased PCT can occur without infection. PCT concentrations between 0.5 and 2.0 ng/mL should be interpreted taking into account the patient's history. It is recommended to retest PCT within 6-24 hours if any concentrations < 2 ng/mL are obtained.    Blood Culture - Blood, Arm, Right [[id number]] Collected:  02/12/19 0059    Specimen:  Blood from Arm, Right Updated:  02/12/19 0112    Blood Culture - Blood, Arm, Left [[id number]] Collected:  02/12/19 0055    Specimen:  Blood from Arm, Left Updated:  02/12/19 0112    Lactic Acid, Plasma [[id number]]  (Abnormal) Collected:  02/12/19 0019    Specimen:  Blood Updated:  02/12/19 0047     Lactate 6.1 mmol/L     Lavender Top [[id number]] Collected:  02/11/19 2344    Specimen:  Blood Updated:  02/12/19 0045     Extra Tube hold for add-on     Comment: Auto resulted       Green Top (No Gel) [[id number]] Collected:  02/11/19 2344    Specimen:  Blood Updated:  02/12/19 0045     Extra Tube Hold for add-ons.     Comment: Auto resulted.       Comprehensive Metabolic Panel [[id number]]  (Abnormal) Collected:  02/11/19 2344    Specimen:   Blood Updated:  02/12/19 0032     Glucose 96 mg/dL      BUN 11 mg/dL      Creatinine 0.80 mg/dL      Sodium 119 mmol/L      Potassium 2.1 mmol/L      Chloride 80 mmol/L      CO2 20.0 mmol/L      Calcium 7.7 mg/dL      Total Protein 6.0 g/dL      Albumin 2.90 g/dL      ALT (SGPT) 17 U/L      AST (SGOT) 74 U/L      Alkaline Phosphatase 147 U/L      Total Bilirubin 0.7 mg/dL      eGFR Non African Amer 74 mL/min/1.73      Globulin 3.1 gm/dL      A/G Ratio 0.9 g/dL      BUN/Creatinine Ratio 13.8     Anion Gap 21.1 mmol/L     Narrative:       GFR Normal >60  Chronic Kidney Disease <60  Kidney Failure <15    BNP [[id number]]  (Abnormal) Collected:  02/11/19 2344    Specimen:  Blood Updated:  02/12/19 0032     proBNP 1,870.0 pg/mL     CBC & Differential [[id number]] Collected:  02/11/19 2344    Specimen:  Blood Updated:  02/12/19 0018    Narrative:       The following orders were created for panel order CBC & Differential.  Procedure                               Abnormality         Status                     ---------                               -----------         ------                     Scan Slide[[id number]]                                       Final result               CBC Auto Differential[[id number]]        Abnormal            Final result                 Please view results for these tests on the individual orders.    CBC Auto Differential [[id number]]  (Abnormal) Collected:  02/11/19 2344    Specimen:  Blood Updated:  02/12/19 0018     WBC 5.68 10*3/mm3      RBC 3.59 10*6/mm3      Hemoglobin 12.2 g/dL      Hematocrit 34.6 %      MCV 96.4 fL      MCH 34.0 pg      MCHC 35.3 g/dL      RDW 12.8 %      RDW-SD 45.0 fl      MPV 9.4 fL      Platelets 270 10*3/mm3      Neutrophil % 75.9 %      Lymphocyte % 19.0 %      Monocyte % 2.1 %      Eosinophil % 0.2 %      Basophil % 1.4 %      Immature Grans % 1.4 %      Neutrophils, Absolute 4.31 10*3/mm3      Lymphocytes, Absolute 1.08 10*3/mm3      Monocytes, Absolute 0.12  10*3/mm3      Eosinophils, Absolute 0.01 10*3/mm3      Basophils, Absolute 0.08 10*3/mm3      Immature Grans, Absolute 0.08 10*3/mm3      nRBC 0.0 /100 WBC     Scan Slide [[id number]] Collected:  02/11/19 2344    Specimen:  Blood Updated:  02/12/19 0018     RBC Morphology Normal     WBC Morphology Normal     Platelet Estimate Adequate    Troponin [[id number]]  (Normal) Collected:  02/11/19 2344    Specimen:  Blood Updated:  02/12/19 0015     Troponin I <0.012 ng/mL     Narrative:       Normal Patient Upper Reference Limit (URL) (99th Percentile)=0.03 ng/mL   Non-AMI Illness Reference Limit=0.03-0.11 ng/mL   AMI Confirmation=0.12 ng/mL and above    Blood Gas, Arterial With Co-Ox [[id number]]  (Abnormal) Collected:  02/11/19 2348    Specimen:  Arterial Blood Updated:  02/11/19 2349     Site Left Radial     [place]'s Test Positive     pH, Arterial 7.310 pH units      Comment: 84 Value below reference range        pCO2, Arterial 43.5 mm Hg      pO2, Arterial 60.4 mm Hg      Comment: 84 Value below reference range        HCO3, Arterial 21.9 mmol/L      Base Excess, Arterial -4.3 mmol/L      Comment: 84 Value below reference range        O2 Saturation, Arterial 88.4 %      Comment: 84 Value below reference range        Hemoglobin, Blood Gas 12.8 g/dL      Hematocrit, Blood Gas 39.3 %      Oxyhemoglobin 85.0 %      Comment: 84 Value below reference range        Methemoglobin 0.60 %      Carboxyhemoglobin 3.3 %      A-a Gradiant -- mmHg      Comment: UNABLE TO CALCULATE        Barometric Pressure for Blood Gas 730 mmHg      Modality BiPap     FIO2 50 %      Ventilator Mode BiPAP     Set [facility] Resp Rate 14.0     IPAP 16     Comment: Meter: [id number]     :  [id number]        EPAP 8     Note --     pH, Temp Corrected -- pH Units      pCO2, Temperature Corrected -- mm Hg      pO2, Temperature Corrected -- mm Hg         Imaging Results (last 24 hours)     Procedure Component Value Units Date/Time    XR Chest 1 View  [[id number]] Collected:  02/12/19 0959     Updated:  02/12/19 0959    Narrative:       PROCEDURE: XR CHEST 1 VW-     HISTORY: soa     COMPARISON: December 2018.     FINDINGS: The heart is normal in size. The mediastinum is unremarkable.  There are diffuse bilateral airspace disease superimposed on chronic  changes. This may be due to edema or pneumonia. There is no  pneumothorax.             Impression:       Diffuse bilateral airspace disease superimposed on chronic  changes.     Continued follow-up is recommended.     Images were reviewed, interpreted, and dictated by Dr. Tacos Day M.D.  Transcribed by Елена Barnett PA-C.        Ventilator/Non-Invasive Ventilation Settings (From admission, onward)    Start     Ordered    02/12/19 0325  NIPPV-IPPV / BIPAP / CPAP  At Bedtime & As Needed-RT     Comments:  RT to titrate to keep spo2>90%   Question Answer Comment   Type: BIPAP    NIPPV Mask Interface Per Patient Preference    IPAP 16    EPAP 6    Oxygen FIO2    FIO2 % 50        02/12/19 0324          Consult Notes (last 24 hours) (Notes from 2/11/2019 11:34 AM through 2/12/2019 11:34 AM)     No notes of this type exist for this encounter.

## 2019-02-13 ENCOUNTER — APPOINTMENT (OUTPATIENT)
Dept: CARDIOLOGY | Facility: HOSPITAL | Age: 59
End: 2019-02-13

## 2019-02-13 ENCOUNTER — APPOINTMENT (OUTPATIENT)
Dept: GENERAL RADIOLOGY | Facility: HOSPITAL | Age: 59
End: 2019-02-13

## 2019-02-13 VITALS
HEART RATE: 133 BPM | WEIGHT: 94.4 LBS | HEIGHT: 59 IN | OXYGEN SATURATION: 100 % | SYSTOLIC BLOOD PRESSURE: 99 MMHG | TEMPERATURE: 103.2 F | DIASTOLIC BLOOD PRESSURE: 66 MMHG | BODY MASS INDEX: 19.03 KG/M2 | RESPIRATION RATE: 26 BRPM

## 2019-02-13 LAB
A-A DO2: ABNORMAL MMHG
ALBUMIN SERPL-MCNC: 2.3 G/DL (ref 3.5–5)
ALBUMIN/GLOB SERPL: 1 G/DL (ref 1–2)
ALP SERPL-CCNC: 98 U/L (ref 38–126)
ALT SERPL W P-5'-P-CCNC: 36 U/L (ref 13–69)
ANION GAP SERPL CALCULATED.3IONS-SCNC: 12.5 MMOL/L (ref 10–20)
ANION GAP SERPL CALCULATED.3IONS-SCNC: 9.4 MMOL/L (ref 10–20)
ANISOCYTOSIS BLD QL: ABNORMAL
ARTERIAL PATENCY WRIST A: ABNORMAL
AST SERPL-CCNC: 105 U/L (ref 15–46)
ATMOSPHERIC PRESS: 734 MMHG
ATMOSPHERIC PRESS: 736 MMHG
ATMOSPHERIC PRESS: 736 MMHG
BASE EXCESS BLDA CALC-SCNC: -14.3 MMOL/L (ref 0–2)
BASE EXCESS BLDA CALC-SCNC: -16.7 MMOL/L (ref 0–2)
BASE EXCESS BLDA CALC-SCNC: -9.9 MMOL/L (ref 0–2)
BDY SITE: ABNORMAL
BILIRUB SERPL-MCNC: 0.7 MG/DL (ref 0.2–1.3)
BUN BLD-MCNC: 5 MG/DL (ref 7–20)
BUN BLD-MCNC: 7 MG/DL (ref 7–20)
BUN/CREAT SERPL: 5.6 (ref 7.1–23.5)
BUN/CREAT SERPL: 7 (ref 7.1–23.5)
CALCIUM SPEC-SCNC: 6.6 MG/DL (ref 8.4–10.2)
CALCIUM SPEC-SCNC: 6.7 MG/DL (ref 8.4–10.2)
CHLORIDE SERPL-SCNC: 111 MMOL/L (ref 98–107)
CHLORIDE SERPL-SCNC: 113 MMOL/L (ref 98–107)
CO2 SERPL-SCNC: 15 MMOL/L (ref 26–30)
CO2 SERPL-SCNC: 16 MMOL/L (ref 26–30)
COHGB MFR BLD: 1 % (ref 0–2)
COHGB MFR BLD: 1.1 % (ref 0–2)
COHGB MFR BLD: 1.2 % (ref 0–2)
CORTIS AM PEAK SERPL-MCNC: 57.2 UG/DL (ref 6.2–19.4)
CREAT BLD-MCNC: 0.9 MG/DL (ref 0.6–1.3)
CREAT BLD-MCNC: 1 MG/DL (ref 0.6–1.3)
DEPRECATED RDW RBC AUTO: 52.6 FL (ref 37–54)
ERYTHROCYTE [DISTWIDTH] IN BLOOD BY AUTOMATED COUNT: 14.3 % (ref 11.5–14.5)
ERYTHROCYTE [SEDIMENTATION RATE] IN BLOOD: 30 MM/HR (ref 0–20)
GFR SERPL CREATININE-BSD FRML MDRD: 57 ML/MIN/1.73
GFR SERPL CREATININE-BSD FRML MDRD: 65 ML/MIN/1.73
GLOBULIN UR ELPH-MCNC: 2.3 GM/DL
GLUCOSE BLD-MCNC: 222 MG/DL (ref 74–98)
GLUCOSE BLD-MCNC: 226 MG/DL (ref 74–98)
GLUCOSE BLDC GLUCOMTR-MCNC: 184 MG/DL (ref 70–130)
GLUCOSE BLDC GLUCOMTR-MCNC: 58 MG/DL (ref 70–130)
GLUCOSE BLDC GLUCOMTR-MCNC: 59 MG/DL (ref 70–130)
GLUCOSE BLDC GLUCOMTR-MCNC: 63 MG/DL (ref 70–130)
HCO3 BLDA-SCNC: 14.9 MMOL/L (ref 22–28)
HCO3 BLDA-SCNC: 15.8 MMOL/L (ref 22–28)
HCO3 BLDA-SCNC: 18.7 MMOL/L (ref 22–28)
HCT VFR BLD AUTO: 30.3 % (ref 37–47)
HCT VFR BLD CALC: 30 %
HCT VFR BLD CALC: 30.5 %
HCT VFR BLD CALC: 31.5 %
HGB BLD-MCNC: 9.9 G/DL (ref 12–16)
HGB BLDA-MCNC: 10.3 G/DL (ref 12–18)
HGB BLDA-MCNC: 9.8 G/DL (ref 12–18)
HGB BLDA-MCNC: 9.9 G/DL (ref 12–18)
HOROWITZ INDEX BLD+IHG-RTO: 100 %
HOROWITZ INDEX BLD+IHG-RTO: 100 %
HOROWITZ INDEX BLD+IHG-RTO: 90 %
LYMPHOCYTES # BLD MANUAL: 0.17 10*3/MM3 (ref 0.6–3.4)
LYMPHOCYTES NFR BLD MANUAL: 1 % (ref 10–50)
LYMPHOCYTES NFR BLD MANUAL: 4 % (ref 5–12)
M PNEUMONIAE IGM ABS: <770 U/ML (ref 0–769)
MAGNESIUM SERPL-MCNC: 1.9 MG/DL (ref 1.6–2.3)
MCH RBC QN AUTO: 33.1 PG (ref 27–31)
MCHC RBC AUTO-ENTMCNC: 32.7 G/DL (ref 30–37)
MCV RBC AUTO: 101.3 FL (ref 81–99)
METAMYELOCYTES NFR BLD MANUAL: 8 % (ref 0–0)
METHGB BLD QL: 0.6 % (ref 0–1.5)
METHGB BLD QL: 0.6 % (ref 0–1.5)
METHGB BLD QL: 1.1 % (ref 0–1.5)
MODALITY: ABNORMAL
MONOCYTES # BLD AUTO: 0.68 10*3/MM3 (ref 0–0.9)
NEUTROPHILS # BLD AUTO: 14.72 10*3/MM3 (ref 2–6.9)
NEUTROPHILS NFR BLD MANUAL: 62 % (ref 37–80)
NEUTS BAND NFR BLD MANUAL: 25 % (ref 0–6)
NOTE: ABNORMAL
OXYHGB MFR BLDV: 91.9 % (ref 94–99)
OXYHGB MFR BLDV: 94 % (ref 94–99)
OXYHGB MFR BLDV: 95.2 % (ref 94–99)
PCO2 BLDA: 53.2 MM HG (ref 35–45)
PCO2 BLDA: 56.4 MM HG (ref 35–45)
PCO2 BLDA: 64.7 MM HG (ref 35–45)
PCO2 TEMP ADJ BLD: ABNORMAL MM HG (ref 35–45)
PEEP RESPIRATORY: 8 CM[H2O]
PEEP RESPIRATORY: 8 CM[H2O]
PH BLDA: 6.97 PH UNITS (ref 7.3–7.5)
PH BLDA: 7.05 PH UNITS (ref 7.3–7.5)
PH BLDA: 7.16 PH UNITS (ref 7.3–7.5)
PH, TEMP CORRECTED: ABNORMAL PH UNITS
PLATELET # BLD AUTO: 172 10*3/MM3 (ref 130–400)
PMV BLD AUTO: 10.3 FL (ref 6–12)
PO2 BLDA: 80 MM HG (ref 75–100)
PO2 BLDA: 81.7 MM HG (ref 75–100)
PO2 BLDA: 87.8 MM HG (ref 75–100)
PO2 TEMP ADJ BLD: ABNORMAL MM HG (ref 83–108)
POIKILOCYTOSIS BLD QL SMEAR: ABNORMAL
POTASSIUM BLD-SCNC: 6.4 MMOL/L (ref 3.5–5.1)
POTASSIUM BLD-SCNC: 6.5 MMOL/L (ref 3.5–5.1)
PROCALCITONIN SERPL-MCNC: 181.52 NG/ML
PROT SERPL-MCNC: 4.6 G/DL (ref 6.3–8.2)
RBC # BLD AUTO: 2.99 10*6/MM3 (ref 4.2–5.4)
SAO2 % BLDCOA: 93.9 % (ref 94–100)
SAO2 % BLDCOA: 95.6 % (ref 94–100)
SAO2 % BLDCOA: 96.9 % (ref 94–100)
SCAN SLIDE: NORMAL
SET MECH RESP RATE: 24
SET MECH RESP RATE: 28
SMALL PLATELETS BLD QL SMEAR: ADEQUATE
SODIUM BLD-SCNC: 132 MMOL/L (ref 137–145)
SODIUM BLD-SCNC: 132 MMOL/L (ref 137–145)
TOXIC GRANULATION: ABNORMAL
VENTILATOR MODE: ABNORMAL
VENTILATOR MODE: AC
VENTILATOR MODE: AC
VT ON VENT VENT: 450 ML
VT ON VENT VENT: 450 ML
WBC NRBC COR # BLD: 16.92 10*3/MM3 (ref 4.8–10.8)

## 2019-02-13 PROCEDURE — 84145 PROCALCITONIN (PCT): CPT | Performed by: FAMILY MEDICINE

## 2019-02-13 PROCEDURE — 25010000002 METHYLPREDNISOLONE PER 125 MG: Performed by: INTERNAL MEDICINE

## 2019-02-13 PROCEDURE — 36600 WITHDRAWAL OF ARTERIAL BLOOD: CPT

## 2019-02-13 PROCEDURE — 25010000002 ENOXAPARIN PER 10 MG: Performed by: INTERNAL MEDICINE

## 2019-02-13 PROCEDURE — 94799 UNLISTED PULMONARY SVC/PX: CPT

## 2019-02-13 PROCEDURE — 25010000002 AZITHROMYCIN: Performed by: INTERNAL MEDICINE

## 2019-02-13 PROCEDURE — 83050 HGB METHEMOGLOBIN QUAN: CPT

## 2019-02-13 PROCEDURE — 25010000002 CALCIUM GLUCONATE PER 10 ML

## 2019-02-13 PROCEDURE — 76937 US GUIDE VASCULAR ACCESS: CPT | Performed by: INTERNAL MEDICINE

## 2019-02-13 PROCEDURE — 71045 X-RAY EXAM CHEST 1 VIEW: CPT

## 2019-02-13 PROCEDURE — 82962 GLUCOSE BLOOD TEST: CPT

## 2019-02-13 PROCEDURE — 85025 COMPLETE CBC W/AUTO DIFF WBC: CPT | Performed by: FAMILY MEDICINE

## 2019-02-13 PROCEDURE — 36556 INSERT NON-TUNNEL CV CATH: CPT | Performed by: INTERNAL MEDICINE

## 2019-02-13 PROCEDURE — 25010000002 VANCOMYCIN PER 500 MG: Performed by: INTERNAL MEDICINE

## 2019-02-13 PROCEDURE — 36620 INSERTION CATHETER ARTERY: CPT | Performed by: INTERNAL MEDICINE

## 2019-02-13 PROCEDURE — 94003 VENT MGMT INPAT SUBQ DAY: CPT

## 2019-02-13 PROCEDURE — 94770: CPT

## 2019-02-13 PROCEDURE — 82375 ASSAY CARBOXYHB QUANT: CPT

## 2019-02-13 PROCEDURE — 83735 ASSAY OF MAGNESIUM: CPT | Performed by: INTERNAL MEDICINE

## 2019-02-13 PROCEDURE — 85007 BL SMEAR W/DIFF WBC COUNT: CPT | Performed by: FAMILY MEDICINE

## 2019-02-13 PROCEDURE — 25010000002 PHENYLEPHRINE 10 MG/ML SOLUTION 5 ML VIAL: Performed by: FAMILY MEDICINE

## 2019-02-13 PROCEDURE — 80053 COMPREHEN METABOLIC PANEL: CPT | Performed by: FAMILY MEDICINE

## 2019-02-13 PROCEDURE — 99239 HOSP IP/OBS DSCHRG MGMT >30: CPT | Performed by: FAMILY MEDICINE

## 2019-02-13 PROCEDURE — 85651 RBC SED RATE NONAUTOMATED: CPT | Performed by: FAMILY MEDICINE

## 2019-02-13 PROCEDURE — 25010000002 PIPERACILLIN SOD-TAZOBACTAM PER 1 G: Performed by: INTERNAL MEDICINE

## 2019-02-13 PROCEDURE — 82805 BLOOD GASES W/O2 SATURATION: CPT

## 2019-02-13 PROCEDURE — 99291 CRITICAL CARE FIRST HOUR: CPT | Performed by: INTERNAL MEDICINE

## 2019-02-13 RX ORDER — SODIUM CHLORIDE 0.9 % (FLUSH) 0.9 %
10 SYRINGE (ML) INJECTION AS NEEDED
Status: DISCONTINUED | OUTPATIENT
Start: 2019-02-13 | End: 2019-02-13 | Stop reason: HOSPADM

## 2019-02-13 RX ORDER — DEXTROSE MONOHYDRATE 25 G/50ML
INJECTION, SOLUTION INTRAVENOUS
Status: COMPLETED
Start: 2019-02-13 | End: 2019-02-13

## 2019-02-13 RX ORDER — DEXTROSE MONOHYDRATE 25 G/50ML
50 INJECTION, SOLUTION INTRAVENOUS ONCE
Status: COMPLETED | OUTPATIENT
Start: 2019-02-13 | End: 2019-02-13

## 2019-02-13 RX ORDER — FAMOTIDINE 10 MG/ML
20 INJECTION, SOLUTION INTRAVENOUS EVERY 12 HOURS SCHEDULED
Start: 2019-02-13

## 2019-02-13 RX ORDER — SODIUM CHLORIDE 0.9 % (FLUSH) 0.9 %
10 SYRINGE (ML) INJECTION EVERY 12 HOURS SCHEDULED
Status: DISCONTINUED | OUTPATIENT
Start: 2019-02-13 | End: 2019-02-13 | Stop reason: HOSPADM

## 2019-02-13 RX ORDER — ACETAMINOPHEN 650 MG/1
650 SUPPOSITORY RECTAL EVERY 6 HOURS PRN
Status: DISCONTINUED | OUTPATIENT
Start: 2019-02-13 | End: 2019-02-13 | Stop reason: HOSPADM

## 2019-02-13 RX ORDER — CALCIUM GLUCONATE 94 MG/ML
INJECTION, SOLUTION INTRAVENOUS
Status: COMPLETED
Start: 2019-02-13 | End: 2019-02-13

## 2019-02-13 RX ORDER — GABAPENTIN 400 MG/1
400 CAPSULE ORAL EVERY 8 HOURS SCHEDULED
Start: 2019-02-13

## 2019-02-13 RX ORDER — DEXTROSE, SODIUM CHLORIDE, AND POTASSIUM CHLORIDE 5; .9; .15 G/100ML; G/100ML; G/100ML
100 INJECTION INTRAVENOUS CONTINUOUS
Status: DISCONTINUED | OUTPATIENT
Start: 2019-02-13 | End: 2019-02-13

## 2019-02-13 RX ORDER — CALCIUM GLUCONATE 94 MG/ML
1 INJECTION, SOLUTION INTRAVENOUS ONCE
Status: DISCONTINUED | OUTPATIENT
Start: 2019-02-13 | End: 2019-02-13 | Stop reason: HOSPADM

## 2019-02-13 RX ORDER — ACETAMINOPHEN 325 MG/1
650 TABLET ORAL EVERY 4 HOURS PRN
Start: 2019-02-13

## 2019-02-13 RX ORDER — BUDESONIDE 0.5 MG/2ML
0.5 INHALANT ORAL
Start: 2019-02-13

## 2019-02-13 RX ORDER — SODIUM CHLORIDE 0.9 % (FLUSH) 0.9 %
20 SYRINGE (ML) INJECTION AS NEEDED
Status: DISCONTINUED | OUTPATIENT
Start: 2019-02-13 | End: 2019-02-13 | Stop reason: HOSPADM

## 2019-02-13 RX ORDER — DEXTROSE AND SODIUM CHLORIDE 5; .9 G/100ML; G/100ML
100 INJECTION, SOLUTION INTRAVENOUS CONTINUOUS
Status: DISCONTINUED | OUTPATIENT
Start: 2019-02-13 | End: 2019-02-13 | Stop reason: HOSPADM

## 2019-02-13 RX ORDER — DEXTROSE MONOHYDRATE 25 G/50ML
50 INJECTION, SOLUTION INTRAVENOUS ONCE
Status: DISCONTINUED | OUTPATIENT
Start: 2019-02-13 | End: 2019-02-13 | Stop reason: HOSPADM

## 2019-02-13 RX ORDER — METHYLPREDNISOLONE SODIUM SUCCINATE 125 MG/2ML
80 INJECTION, POWDER, LYOPHILIZED, FOR SOLUTION INTRAMUSCULAR; INTRAVENOUS EVERY 8 HOURS
Start: 2019-02-13

## 2019-02-13 RX ADMIN — GUAIFENESIN 200 MG: 200 SOLUTION ORAL at 01:57

## 2019-02-13 RX ADMIN — Medication 1 CAPSULE: at 08:58

## 2019-02-13 RX ADMIN — Medication 400 MG: at 08:58

## 2019-02-13 RX ADMIN — DEXTROSE MONOHYDRATE 50 ML: 500 INJECTION PARENTERAL at 02:14

## 2019-02-13 RX ADMIN — NICOTINE 1 PATCH: 21 PATCH TRANSDERMAL at 08:58

## 2019-02-13 RX ADMIN — ACETAMINOPHEN 650 MG: 650 SUPPOSITORY RECTAL at 06:35

## 2019-02-13 RX ADMIN — GUAIFENESIN 200 MG: 200 SOLUTION ORAL at 08:58

## 2019-02-13 RX ADMIN — IPRATROPIUM BROMIDE AND ALBUTEROL SULFATE 3 ML: .5; 3 SOLUTION RESPIRATORY (INHALATION) at 07:10

## 2019-02-13 RX ADMIN — DEXTROSE AND SODIUM CHLORIDE 100 ML/HR: 5; 900 INJECTION, SOLUTION INTRAVENOUS at 09:31

## 2019-02-13 RX ADMIN — IPRATROPIUM BROMIDE AND ALBUTEROL SULFATE 3 ML: .5; 3 SOLUTION RESPIRATORY (INHALATION) at 01:08

## 2019-02-13 RX ADMIN — CALCIUM GLUCONATE 1 G: 98 INJECTION, SOLUTION INTRAVENOUS at 06:36

## 2019-02-13 RX ADMIN — FAMOTIDINE 20 MG: 10 INJECTION INTRAVENOUS at 09:00

## 2019-02-13 RX ADMIN — METHYLPREDNISOLONE SODIUM SUCCINATE 80 MG: 125 INJECTION, POWDER, FOR SOLUTION INTRAMUSCULAR; INTRAVENOUS at 08:58

## 2019-02-13 RX ADMIN — TAZOBACTAM SODIUM AND PIPERACILLIN SODIUM 4.5 G: 500; 4 INJECTION, SOLUTION INTRAVENOUS at 08:57

## 2019-02-13 RX ADMIN — SODIUM CHLORIDE, PRESERVATIVE FREE 10 ML: 5 INJECTION INTRAVENOUS at 08:54

## 2019-02-13 RX ADMIN — METHYLPREDNISOLONE SODIUM SUCCINATE 80 MG: 125 INJECTION, POWDER, FOR SOLUTION INTRAMUSCULAR; INTRAVENOUS at 00:12

## 2019-02-13 RX ADMIN — PHENYLEPHRINE HYDROCHLORIDE 1 MCG/KG/MIN: 10 INJECTION INTRAVENOUS at 09:22

## 2019-02-13 RX ADMIN — DEXTROSE MONOHYDRATE 50 ML: 500 INJECTION PARENTERAL at 06:11

## 2019-02-13 RX ADMIN — SODIUM BICARBONATE 50 MEQ: 84 INJECTION, SOLUTION INTRAVENOUS at 10:24

## 2019-02-13 RX ADMIN — SODIUM CHLORIDE, PRESERVATIVE FREE 10 ML: 5 INJECTION INTRAVENOUS at 08:53

## 2019-02-13 RX ADMIN — SODIUM CHLORIDE 500 ML: 9 INJECTION, SOLUTION INTRAVENOUS at 03:52

## 2019-02-13 RX ADMIN — NOREPINEPHRINE BITARTRATE 0.02 MCG/KG/MIN: 1 INJECTION INTRAVENOUS at 04:56

## 2019-02-13 RX ADMIN — IBUPROFEN 600 MG: 100 SUSPENSION ORAL at 11:06

## 2019-02-13 RX ADMIN — ENOXAPARIN SODIUM 40 MG: 40 INJECTION SUBCUTANEOUS at 03:55

## 2019-02-13 RX ADMIN — POTASSIUM CHLORIDE, DEXTROSE MONOHYDRATE AND SODIUM CHLORIDE 100 ML/HR: 150; 5; 900 INJECTION, SOLUTION INTRAVENOUS at 00:58

## 2019-02-13 RX ADMIN — TAZOBACTAM SODIUM AND PIPERACILLIN SODIUM 4.5 G: 500; 4 INJECTION, SOLUTION INTRAVENOUS at 00:12

## 2019-02-13 RX ADMIN — SODIUM BICARBONATE 50 MEQ: 84 INJECTION, SOLUTION INTRAVENOUS at 10:44

## 2019-02-13 RX ADMIN — CHLORHEXIDINE GLUCONATE 0.12% ORAL RINSE 15 ML: 1.2 LIQUID ORAL at 08:57

## 2019-02-13 RX ADMIN — VANCOMYCIN HYDROCHLORIDE 500 MG: 500 INJECTION, POWDER, LYOPHILIZED, FOR SOLUTION INTRAVENOUS at 04:03

## 2019-02-13 RX ADMIN — BUDESONIDE 0.5 MG: 0.5 INHALANT RESPIRATORY (INHALATION) at 07:10

## 2019-02-13 RX ADMIN — SODIUM BICARBONATE 50 ML/HR: 84 INJECTION, SOLUTION INTRAVENOUS at 10:59

## 2019-02-13 RX ADMIN — AZITHROMYCIN 500 MG: 500 INJECTION, POWDER, LYOPHILIZED, FOR SOLUTION INTRAVENOUS at 04:03

## 2019-02-13 RX ADMIN — SODIUM CHLORIDE, PRESERVATIVE FREE 3 ML: 5 INJECTION INTRAVENOUS at 08:54

## 2019-02-13 NOTE — NURSING NOTE
[facility] team here assessing and preparing patient for transport. Significant other at bedside, and took patient's belongings (upper and lower dentures, earrings). Patient's parents notified via phone by [facility] team.

## 2019-02-13 NOTE — NURSING NOTE
Called pt's significant other approx 0630 for update on pts status,including decline in vital signs and resp status.  He had just arrived in parking lot and update was given at bedside.  He stated he had no further questions at that time.

## 2019-02-13 NOTE — PROGRESS NOTES
"  CC: Acute Respiratory Failure.     S: Intubated and sedated. Events noted.     ROS: Could not be obtained as the patient is on mechanical ventilator.    O:Vital signs reviewed. O2Sat: 94 % on 100 %.  Vent Day # 2  BP (!) 85/54   Pulse (!) 124   Temp (!) 102.8 °F (39.3 °C) (Oral) Comment: md notified  Resp 26   Ht 149.9 cm (59\")   Wt 42.8 kg (94 lb 6.4 oz)   SpO2 95%   BMI 19.07 kg/m²     Temp (24hrs), Av.4 °F (37.4 °C), Min:97.7 °F (36.5 °C), Max:102.8 °F (39.3 °C)      I & Os reviewed.   Intake/Output       19 0700 - 19 0659    Intake (ml) 5501    Output (ml) 485    Net (ml) 5016    Last Weight  42.8 kg (94 lb 6.4 oz)          General: Intubated. Sedated  Eyes: PERRL.   Neck: Supple without JVD. No obvious masses noted.   Cardiovascular: S1 + S2. Tachy   Respiratory: Transmitted Breath sounds noted. No wheezing heard. B/L crackles noted  GI: Soft. Bowel sounds hypoactive  Extremities: No edema noted.  Neurologic: Sedated. Detailed exam couldn't be performed due to sedation.   Skin: Appeared somewhat dry     Labs: Reviewed.     Results from last 7 days   Lab Units 19  0422 19  1612 19  0413 19  2344   SODIUM mmol/L 132* 129* 122* 119*   POTASSIUM mmol/L 6.5* 3.5 1.9* 2.1*   CHLORIDE mmol/L 111* 103 91* 80*   CO2 mmol/L 15.0* 17.0* 22.0* 20.0*   BUN mg/dL 5* 4* 8 11   CREATININE mg/dL 0.90 0.50* 0.50* 0.80   CALCIUM mg/dL 6.6* 7.3* 6.9* 7.7*   BILIRUBIN mg/dL 0.7  --   --  0.7   ALK PHOS U/L 98  --   --  147*   ALT (SGPT) U/L 36  --   --  17   AST (SGOT) U/L 105*  --   --  74*   GLUCOSE mg/dL 226* 76 96 96       Results from last 7 days   Lab Units 19  0422 19  0413   MAGNESIUM mg/dL 1.9 1.2*       Results from last 7 days   Lab Units 19  0422 19  0413 19  2344   WBC 10*3/mm3 16.92* 1.37* 5.68   HEMOGLOBIN g/dL 9.9* 12.7 12.2   PLATELETS 10*3/mm3 172 212 270       Lab Results   Component Value Date    PROCALCITO 181.52 (H) 2019    " PROCALCITO 151.29 (H) 02/12/2019    PROCALCITO 0.83 (H) 12/28/2018               dextrose 5 % and sodium chloride 0.9 % with KCl 20 mEq 100 mL/hr Last Rate: 100 mL/hr (02/13/19 0058)   midazolam (VERSED) infusion 2 mg/hr Last Rate: Stopped (02/13/19 0532)   norepinephrine 0.02-0.3 mcg/kg/min Last Rate: 0.25 mcg/kg/min (02/13/19 0610)   Pharmacy Consult     [START ON 2/14/2019] Pharmacy Consult           ABG: Reviewed  Lab Results   Component Value Date    PHART 7.055 (C) 02/13/2019    ALW5MCA 56.4 (C) 02/13/2019    PO2ART 80.0 02/13/2019    HGBBG 9.8 (L) 02/13/2019    N3QETBZI 93.9 (L) 02/13/2019    CARBOXYHGB 1.0 02/13/2019       Micro:   Lab Results   Component Value Date    RESPCX Moderate growth (3+) Gram Negative Bacilli (A) 02/12/2019    RESPCX Moderate growth (3+) Candida albicans (A) 12/25/2018     Lab Results   Component Value Date    BLOODCX No growth at 24 hours 02/12/2019    BLOODCX No growth at 24 hours 02/12/2019     No results found for: URINECX  Lab Results   Component Value Date    MRSACX  12/26/2018     No Methicillin Resistant Staphylococcus aureus isolated    MRSACX  12/25/2018     No Methicillin Resistant Staphylococcus aureus isolated         CXRay: Pending from today.  Imaging Results (last 24 hours)     Procedure Component Value Units Date/Time    XR Chest 1 View [[id number]] Updated:  02/13/19 0619    XR Abdomen KUB [[id number]] Collected:  02/12/19 1410     Updated:  02/12/19 1413    Narrative:       PROCEDURE: XR ABDOMEN KUB-     HISTORY: NG TUBE PLACEMENT; J96.01-Acute respiratory failure with  hypoxia; A41.9-Sepsis, unspecified organism; J18.9-Pneumonia,  unspecified organism; E87.7-Iqdz-mfmmmtyflo and hyponatremia;  E87.6-Hypokalemia     COMPARISON: None.     FINDINGS: An AP view of the abdomen and pelvis demonstrates an  unremarkable bowel gas pattern with no evidence of obstruction. A  nasogastric tube is in place with the tip in the body of the stomach.             Impression:        Nasogastric tube in place with the tip in the body of the  stomach.             This report was finalized on 2/12/2019 2:11 PM by Tacos Day M.D..    XR Chest 1 View [[id number]] Collected:  02/12/19 1410     Updated:  02/12/19 1413    Narrative:       PROCEDURE: XR CHEST 1 VW-     HISTORY: Confirm ET Tube Placement; J96.01-Acute respiratory failure  with hypoxia; A41.9-Sepsis, unspecified organism; J18.9-Pneumonia,  unspecified organism; E87.9-Nwee-mttdcgzcgx and hyponatremia;  E87.6-Hypokalemia     COMPARISON: February 11, 2019.     FINDINGS: An endotracheal tube is in place and is well-positioned with  the tip above the asaf. A nasogastric tube extends below the diaphragm  and is present in the body the stomach. The heart is normal in size. The  mediastinum is unremarkable. There is diffuse bilateral airspace disease  with worsening bibasilar opacities. There is no pneumothorax.  There are  no acute osseous abnormalities.           Impression:       Support tubes appropriately positioned.     Continued followup is recommended.     This report was finalized on 2/12/2019 2:10 PM by Tacos Day M.D..    XR Chest 1 View [[id number]] Collected:  02/12/19 0959     Updated:  02/12/19 1236    Narrative:       PROCEDURE: XR CHEST 1 VW-     HISTORY: soa     COMPARISON: December 2018.     FINDINGS: The heart is normal in size. The mediastinum is unremarkable.  There are diffuse bilateral airspace disease superimposed on chronic  changes. This may be due to edema or pneumonia. There is no  pneumothorax.             Impression:       Diffuse bilateral airspace disease superimposed on chronic  changes.     Continued follow-up is recommended.     Images were reviewed, interpreted, and dictated by Dr. Tacos Day M.D.  Transcribed by Елена Barnett PA-C.     This report was finalized on 2/12/2019 12:34 PM by Tacos Day M.D..            Assessment & Recommendations/Plan:   1.  Acute Respiratory  Failure  Will change to APRV mode.   Will repeat ABG in 1-2 hours after APRV.  Will do CXR and ABG in AM.   Continue SoluMedrol at 80 mg IVP Q8 for now.    2.  Septic shock  Will continue Pressors.    3.  Gram Negative Pneumonia.   Continue current anti biotics till final cultures are available.  Although Candida is also positive in the sputum, it is unlikely to be the causative organism.    4.  Smoking    5.  COPD?    6.  Metabolic acidosis  Likely combination of non anion gap and anion gap metabolic acidosis  She does have chronic diarrhea?    Urine drug screen was negative for any unexpected agents.    The patient's clinical situation has definitely deteriorated over the past few hours.     She clearly has septic shock from pneumonia.  We'll proceed with placement of central line as well as arterial line since the patient is now requiring increasing doses of pressors    We have reviewed patient's current orders and changes, if any, have been suggested to primary care team. Plan was also discussed with nursing staff, as necessary.     Critical Care time spent in direct patient care: 40 minutes (excluding procedure time, if applicable) including high complexity decision making to assess, manipulate, and support vital organ system failure in this individual who has impairment of one or more vital organ systems such that there is a high probability of imminent or life threatening deterioration in the patient’s condition.      This document was electronically signed by Hao Perales MD on February 13, 2019 at 7:25 AM    Dictated utilizing Dragon dictation.

## 2019-02-13 NOTE — PROCEDURES
Date of Procedure: February 13, 2019     Type: Left Radial Arterial Line placement    Reason: Shock.     Consent: Procedure was done under emergent situation and informed consent was not obtained although the boyfriend, at her bedside, was informed about the risks and benefits of the procedure. Risks including but not limited to damage to the overlying structures, bleeding, infection, amputation etc among others were explained.    Time Out: Time out was done with the RN at the bedside, after confirmation of the site and name and date of birth with the patient's ID band.    Details of the procedure: Patient was appropriately positioned and the site was prepared. The entire procedure was done under sterile technique using drape, gown, gloves, mask and ultrasound in a sterile fashion.     The Left side of the wrist was prepared in a sterile fashion and the site was inspected with ultrasound under sterile technique. Once the artery was located, local anesthetic was injected at the site. Under live ultrasound guidance, the artery was pierced with the needle with immediate aspiration of pulsatile blood in the lumen of the syringe. The sheath was threaded over the needle guide wire and left in place with good pulsatile flow noted on the monitor.    The arterial line was secured in place with sterile sutures and sterile dressing applied.     Dressing change and nursing care, to be done as per nursing protocol.    Daily assessment of the continued need of the Arterial Line to be assessed by attending.     Complications:  No immediate complications noted.    This document was electronically signed by Hao Perales MD on 02/13/19 at 8:34 AM

## 2019-02-13 NOTE — PROCEDURES
Central Venous Catheter Insertion Procedure Note    Date of Procedure:  February 13, 2019     Type: Left IJ Central Venous Line placement    Reason: Shock. Acute respiratory failure.    Consent: Procedure was done under emergent situation and informed consent could not be obtained although the boyfriend, at her bedside, was informed about the risks and benefits of the procedure. Risks including but not limited to damage to the overlying structures, pneumothorax, bleeding, infection, worsening of respiratory status, requirement for chest tube placement among others were explained.    Time Out: Time out was done with the RN at the bedside after confirmation of the name and date of birth with the patient's ID band    Details of the procedure:   Patient was appropriately positioned and the site was prepared. Maximum sterile technique was used including usual patient drapes, antiseptic technique and physician sterile garments.    The Left side of the neck was prepared in a sterile fashion and the site was inspected with ultrasound under sterile technique.     Local anesthesia was applied to the skin and subcutaneous tissues with lidocaine 1%. The needle was then inserted into the vein. A guide wire was then easily passed through the needle. Central line was then inserted into the vessel over the guide wire. The catheter was sutured into place and dressed following sterile protocol and sterile dressing applied.    Central line was immediately accessed and nursing staff was instructed to call with results of Chest X-Ray, if abnormal.    Dressing change and nursing care, to be done as per nursing protocol.      Findings:  There were no changes to vital signs.   All catheter ports were flushed with saline.   Patient did tolerate procedure well.       Complications:  No immediate complications noted.      Recommendations:  CXR ordered to verify placement. No chest x-ray ordered if this was a femoral vein.    Daily assessment  of the continued need of the Central Line to be assessed by ordering provider/attending physician.      This document was electronically signed by Hao Perales MD on February 13, 2019 at 8:31 AM

## 2019-02-13 NOTE — DISCHARGE SUMMARY
[facility] HOSPITALIST   DISCHARGE SUMMARY      Name:  Ce Reynolds   Age:  58 y.o.  Sex:  female  :  1960  MRN:  [id number]   Visit Number:  [id number]  Primary Care Physician:  Lyssa Luciano APRN  Date of Discharge:  2019  Admission Date:  2019    Discharge Diagnosis:     1.  Acute metabolic encephalopathy, present on admission  2.  Acute on chronic hypoxic respiratory failure, present on admission, required bipap then intubated and placement on ventilator 19  3.  Acute hyponatremia, present on admission  4.  Acute severe hypokalemia, present on admission, replaced, now with hyperkalemia  5.  Bilateral interstitial pneumonia, present on admission, uncertain organism, prior to admission  6.  Chronic low back pain with chronic opiate dependence  7.  History of  tobacco dependence  8.  Chronic hypertension controlled  9. Septic shock, prior to admission, secondary to pneumonia, requiring levophed drip  10. ARDS worsening  11. Hypomagnesemia, replaced        History of Present Illness/Hospital Course:  The patient is a 58 yr old lady, generally healthy lady, with controlled chronic COPD on Symbicort, with intermittent smoking cigarettes, and controlled hypertension. She had been ambulating at home, generally feeling well and lives with her boyfriend. She takes chronic percocet, for controlled chronic back pain and does not use drugs. She was in her usual state of health until she developed sudden onset shortness of breath and weakness with drowsiness, unusual symptoms for her so boyfriend brought her to the ER. The patient could barely walk suddenly with severe dyspnea.    She was seen and evaluated in [facility] ER, noted  with severe hypoxia noted at 69%. She initially had pH 7.31 with PCO2 43, with PO2 of 60. Lactic acid 6.  CBC was fairly normal.  She was provided 1.5 L fluid bolus and admitted to the ICU on BiPAP.  CT scan showed bilateral diffuse  interstitial pneumonia.  She has a history of strep pneumonia.  She also has a history of severe hyponatremia and hypokalemia as well.  She was continued on antibiotic therapy with Zosyn, vancomycin, azithromycin.  She received tobramycin, vancomycin, Zosyn in the emergency room.  Unfortunately, she had continued to smoke intermittently despite multiple efforts to stop.  She normally lives independently with her boyfriend at home and is close with her brother in [place].  Her sodium was low prior to admission at 119 with potassium of 2.1.    The following day, her sodium continued to be low at 122 with a potassium of 1.9.  Dr. Ibarra with nephrology assisted with electrolyte replacement and her potassium has improved to 3.5 last night and today is actually elevated She developed rapid progressive respiratory distress requiring intubation and placement on ventilator 2/12/2019.  Unfortunately, she has encountered progressive hypoxemia requiring 100% FiO2 to sat 95%.  She continues now to be febrile, tachycardic, progressively hypotensive despite fluid bolus requiring Levophed vasopressor.  Still, her blood pressure is low so she may require a second pressor.  The pulmonologist, Dr. Perales, is placing a central line.  The patient has an NG tube in place as well.  She is continued on bilateral wrist restraints and a Flores catheter. Her lactic acid improved to 3.8. She required additional magnesium replacement.      Throughout the night, her blood pressure has continued to drop and her pneumonia and ARDS appear to be progressive. Her ABG now worsening despite mechanical ventilation  With ph 7.05, pCO2 56, PO2 of 80, with bicarb of 15.8. Mechanical ventilator assisting to get 95% O2 with  Rate 24, 450mL, 8 peep, with blood pressure 88/56. Her procalcitonin is worsening from 151 to 181. Her sed rate is high at 30 and previously 36, previously Her 2D echo is pending. Her sputum culture so far is growing gram negative april.  Blood cultures are negative. Influenza test was negative. UDS positive for oxycodone, which she is prescribed.  Her prognosis remains extremely poor at this time. I discussed the case with Dr Perales, pulmonology, and we both feel the patient would benefit from ECMO evaluation. The patient would need to be transferred to [facility] ECMO team. I contacted [facility] and spoke with Dr Grajeda at [facility]. He reported he felt the patient would likely be good candidate for the procedure, but wants me to ensure she will have family support. I have contacted the patient's brother Lemuel Meza at [phone]. I left message for him to return my call about his sister's current condition and transfer to [facility] to ensure he will be supportive should patient survive this critical illness and assist her in the outpatient setting later, in addition to her boyfriend. Brother is close to the patient, but reportedly asleep after working third shift this morning. I was able to speak to the patient's mother and father also, Nuvia and Lemuel Meza at [phone]. Both parents were agreeable to help support the patient in home care after hospitalization, should the patient survive this critical illness.     In addition, for hyperkalemia today, she was given calcium gluconate so far and iv fluids changed to exclude potassium.     I have called back Dr Grajeda at [facility] for emergent transfer for ECMO and he graciously agreed to accept the patient in transfer. I am starting neosynephrine also, with leveophed. Dr Grajeda and I discussed the case again deciding method of transport and he was okay with transfer of patient via EMS due to short distance transfer. ECMO team called back and would like to come and pick the patient up with their transfer ECMO team. I notified Jacqui, her nurse.        Consults:     Consults     Date and Time Order Name Status Description    2/12/2019 0934 Inpatient Pulmonology Consult      2/12/2019  0928 Inpatient Nephrology Consult Completed     2/12/2019 0339 Inpatient Pulmonology Consult Completed           Procedures Performed: none      02/13 0833 Insert Arterial Line, INSERT ARTERIAL LINE  02/13 0830 Insert Central Line At Bedside - 3 LUMENS      Vital Signs:    Temp:  [97.7 °F (36.5 °C)-102.8 °F (39.3 °C)] 102.8 °F (39.3 °C)  Heart Rate:  [108-129] 124  Resp:  [19-26] 26  BP: ()/(42-86) 85/54  FiO2 (%):  [90 %-100 %] 100 %    Physical Exam: Reexamined again today    General Appearance:    Sedated, intubated on mechanical ventilator with bilateral wrist restraints and NG tube and collins catheter in place.   Head:    Atraumatic and normocephalic   Eyes:            EOMI. PERRLA. No conjunctivitis   Throat:   Dry. NG tube    Neck:   supple. Trachea in midline.    Lungs:     Diffuse bilateral crackles. No wheeze or rhonchi.     Heart:    Tachy but regular rhythm without murmur   Abdomen:     soft, mild ascites. Reduced bowel sounds.    Extremities:  bilateral wrist restraints, trace edema, no cyanosis. Cool to touch   Skin:   no rash, no bruising, no bleeding   Neurologic:   no tremor. Sedated.                Pertinent Lab Results:     Lab Results (all)     Procedure Component Value Units Date/Time    CBC & Differential [[id number]] Collected:  02/13/19 0422    Specimen:  Blood Updated:  02/13/19 0658    Narrative:       The following orders were created for panel order CBC & Differential.  Procedure                               Abnormality         Status                     ---------                               -----------         ------                     Manual Differential[[id number]]          Abnormal            Final result               Scan Slide[[id number]]                                       Final result               CBC Auto Differential[[id number]]        Abnormal            Final result                 Please view results for these tests on the individual orders.    CBC Auto Differential  [[id number]]  (Abnormal) Collected:  02/13/19 0422    Specimen:  Blood Updated:  02/13/19 0658     WBC 16.92 10*3/mm3      RBC 2.99 10*6/mm3      Hemoglobin 9.9 g/dL      Hematocrit 30.3 %      .3 fL      MCH 33.1 pg      MCHC 32.7 g/dL      RDW 14.3 %      RDW-SD 52.6 fl      MPV 10.3 fL      Platelets 172 10*3/mm3     Scan Slide [[id number]] Collected:  02/13/19 0422    Specimen:  Blood Updated:  02/13/19 0658     Scan Slide --     Comment: See Manual Differential Results       Manual Differential [[id number]]  (Abnormal) Collected:  02/13/19 0422    Specimen:  Blood Updated:  02/13/19 0658     Neutrophil % 62.0 %      Lymphocyte % 1.0 %      Monocyte % 4.0 %      Bands %  25.0 %      Metamyelocyte % 8.0 %      Neutrophils Absolute 14.72 10*3/mm3      Lymphocytes Absolute 0.17 10*3/mm3      Monocytes Absolute 0.68 10*3/mm3      Anisocytosis Slight/1+     Poikilocytes Slight/1+     Toxic Granulation Slight/1+     Platelet Estimate Adequate    Procalcitonin [[id number]]  (Abnormal) Collected:  02/13/19 0422    Specimen:  Blood Updated:  02/13/19 0639     Procalcitonin 181.52 ng/mL     Narrative:       As a Marker for Sepsis (Non-Neonates):   1. <0.5 ng/mL represents a low risk of severe sepsis and/or septic shock.  2. >2 ng/mL represents a high risk of severe sepsis and/or septic shock.    As a Marker for Lower Respiratory Tract Infections that require antibiotic therapy:    PCT on Admission     Antibiotic Therapy       6-12 Hrs later  > 0.5                Strongly Recommended             >0.25 - <0.5         Recommended  0.1 - 0.25           Discouraged              Remeasure/reassess PCT  <0.1                 Strongly Discouraged     Remeasure/reassess PCT                     PCT values of < 0.5 ng/mL do not exclude an infection, because localized infections (without systemic signs) may be associated with such low concentrations, or a systemic infection in its initial stages (< 6 hours). Furthermore, increased  PCT can occur without infection. PCT concentrations between 0.5 and 2.0 ng/mL should be interpreted taking into account the patient's history. It is recommended to retest PCT within 6-24 hours if any concentrations < 2 ng/mL are obtained.    POC Glucose Once [[id number]]  (Abnormal) Collected:  02/13/19 0558    Specimen:  Blood Updated:  02/13/19 0610     Glucose 58 mg/dL      Comment: Serial Number: [id number]:  [id number]       Sedimentation Rate [[id number]]  (Abnormal) Collected:  02/13/19 0422    Specimen:  Blood Updated:  02/13/19 0555     Sed Rate 30 mm/hr     Respiratory Culture - Sputum, NT Suction [[id number]]  (Abnormal) Collected:  02/12/19 1430    Specimen:  Sputum from NT Suction Updated:  02/13/19 0543     Respiratory Culture Moderate growth (3+) Gram Negative Bacilli     Comment: Identification and ANAHI to follow.         Comprehensive Metabolic Panel [[id number]]  (Abnormal) Collected:  02/13/19 0422    Specimen:  Blood Updated:  02/13/19 0536     Glucose 226 mg/dL      Comment: Glucose >180, Hemoglobin A1C recommended.        BUN 5 mg/dL      Creatinine 0.90 mg/dL      Sodium 132 mmol/L      Potassium 6.5 mmol/L      Chloride 111 mmol/L      CO2 15.0 mmol/L      Calcium 6.6 mg/dL      Total Protein 4.6 g/dL      Albumin 2.30 g/dL      ALT (SGPT) 36 U/L      AST (SGOT) 105 U/L      Alkaline Phosphatase 98 U/L      Total Bilirubin 0.7 mg/dL      eGFR Non African Amer 65 mL/min/1.73      Globulin 2.3 gm/dL      A/G Ratio 1.0 g/dL      BUN/Creatinine Ratio 5.6     Anion Gap 12.5 mmol/L     Narrative:       GFR Normal >60  Chronic Kidney Disease <60  Kidney Failure <15    Magnesium [[id number]]  (Normal) Collected:  02/13/19 0422    Specimen:  Blood Updated:  02/13/19 0535     Magnesium 1.9 mg/dL     Blood Gas, Arterial With Co-Ox [[id number]]  (Abnormal) Collected:  02/13/19 0433    Specimen:  Arterial Blood Updated:  02/13/19 0435     Site Right Brachial     Darien's Test N/A     pH, Arterial 7.055 pH  units      Comment: 85 Value below critical limit        pCO2, Arterial 56.4 mm Hg      Comment: 83 Value above reference range        pO2, Arterial 80.0 mm Hg      Comment: 84 Value below reference range        HCO3, Arterial 15.8 mmol/L      Comment: 84 Value below reference range        Base Excess, Arterial -14.3 mmol/L      Comment: 84 Value below reference range        O2 Saturation, Arterial 93.9 %      Comment: 84 Value below reference range        Hemoglobin, Blood Gas 9.8 g/dL      Comment: 84 Value below reference range        Hematocrit, Blood Gas 30.0 %      Comment: 84 Value below reference range        Oxyhemoglobin 91.9 %      Comment: 84 Value below reference range        Methemoglobin 1.10 %      Carboxyhemoglobin 1.0 %      A-a Gradiant -- mmHg      Comment: UNABLE TO CALCULATE        Barometric Pressure for Blood Gas 734 mmHg      Modality Ventilator     FIO2 90 %      Ventilator Mode AC     Set Tidal Volume 450     Set Mech Resp Rate 24.0     Comment: Meter: [id number]     :  [id number]        PEEP 8.0     Note --     pH, Temp Corrected -- pH Units      pCO2, Temperature Corrected -- mm Hg      pO2, Temperature Corrected -- mm Hg     POC Glucose Once [[id number]]  (Abnormal) Collected:  02/13/19 0331    Specimen:  Blood Updated:  02/13/19 0335     Glucose 184 mg/dL      Comment: Serial Number: [id number]:  [id number]       POC Glucose Once [[id number]]  (Abnormal) Collected:  02/13/19 0200    Specimen:  Blood Updated:  02/13/19 0205     Glucose 59 mg/dL      Comment: Serial Number: [id number]:  [id number]       Blood Culture - Blood, Arm, Right [[id number]] Collected:  02/12/19 0059    Specimen:  Blood from Arm, Right Updated:  02/13/19 0115     Blood Culture No growth at 24 hours    Blood Culture - Blood, Arm, Left [[id number]] Collected:  02/12/19 0055    Specimen:  Blood from Arm, Left Updated:  02/13/19 0115     Blood Culture No growth at 24 hours    POC Glucose Once  [[id number]]  (Abnormal) Collected:  02/13/19 0016    Specimen:  Blood Updated:  02/13/19 0020     Glucose 63 mg/dL      Comment: Serial Number: [id number]:  [id number]       Blood Gas, Arterial With Co-Ox [[id number]]  (Abnormal) Collected:  02/12/19 2024    Specimen:  Arterial Blood Updated:  02/12/19 2027     Site Right Brachial     Darien's Test N/A     pH, Arterial 7.141 pH units      Comment: 85 Value below critical limit        pCO2, Arterial 53.1 mm Hg      Comment: 83 Value above reference range        pO2, Arterial 105.0 mm Hg      HCO3, Arterial 18.1 mmol/L      Comment: 84 Value below reference range        Base Excess, Arterial -10.7 mmol/L      Comment: 84 Value below reference range        O2 Saturation, Arterial 97.4 %      Hemoglobin, Blood Gas 10.3 g/dL      Comment: 84 Value below reference range        Hematocrit, Blood Gas 31.6 %      Comment: 84 Value below reference range        Oxyhemoglobin 95.4 %      Methemoglobin 1.10 %      Carboxyhemoglobin 1.0 %      A-a Gradiant -- mmHg      Comment: UNABLE TO CALCULATE        Barometric Pressure for Blood Gas 732 mmHg      Modality Ventilator     FIO2 90 %      Ventilator Mode AC     Set Tidal Volume 450     Set Mech Resp Rate 24.0     Comment: Meter: [id number]     :  [id number]        PEEP 8.0     Note --     pH, Temp Corrected -- pH Units      pCO2, Temperature Corrected -- mm Hg      pO2, Temperature Corrected -- mm Hg     Basic Metabolic Panel [[id number]]  (Abnormal) Collected:  02/12/19 1612    Specimen:  Blood Updated:  02/12/19 1722     Glucose 76 mg/dL      BUN 4 mg/dL      Creatinine 0.50 mg/dL      Sodium 129 mmol/L      Potassium 3.5 mmol/L      Chloride 103 mmol/L      CO2 17.0 mmol/L      Calcium 7.3 mg/dL      eGFR Non African Amer 127 mL/min/1.73      BUN/Creatinine Ratio 8.0     Anion Gap 12.5 mmol/L     Narrative:       GFR Normal >60  Chronic Kidney Disease <60  Kidney Failure <15    Blood Gas, Arterial With Co-Ox  [[id number]]  (Abnormal) Collected:  02/12/19 1604    Specimen:  Arterial Blood Updated:  02/12/19 1605     Site Right Radial     Darien's Test N/A     pH, Arterial 7.095 pH units      Comment: 85 Value below critical limit        pCO2, Arterial 61.9 mm Hg      Comment: 83 Value above reference range        pO2, Arterial 83.4 mm Hg      HCO3, Arterial 19.0 mmol/L      Comment: 84 Value below reference range        Base Excess, Arterial -11.2 mmol/L      Comment: 84 Value below reference range        O2 Saturation, Arterial 92.0 %      Comment: 84 Value below reference range        Hemoglobin, Blood Gas 12.1 g/dL      Hematocrit, Blood Gas 37.0 %      Comment: 84 Value below reference range        Oxyhemoglobin 90.1 %      Comment: 84 Value below reference range        Methemoglobin 1.10 %      Carboxyhemoglobin 1.0 %      A-a Gradiant -- mmHg      Comment: UNABLE TO CALCULATE        Barometric Pressure for Blood Gas 726 mmHg      Modality Ventilator     FIO2 90 %      Ventilator Mode NA     Comment: Meter: [id number]     :  [id number]        Note --     pH, Temp Corrected -- pH Units      pCO2, Temperature Corrected -- mm Hg      pO2, Temperature Corrected -- mm Hg     S. Pneumo Ag Urine or CSF - Urine, Urine, Clean Catch [[id number]] Collected:  02/12/19 1044    Specimen:  Urine, Clean Catch Updated:  02/12/19 1252    Legionella Antigen, Urine - Urine, Urine, Clean Catch [[id number]] Collected:  02/12/19 1044    Specimen:  Urine, Clean Catch Updated:  02/12/19 1252    Sedimentation Rate [[id number]]  (Abnormal) Collected:  02/12/19 1045    Specimen:  Blood Updated:  02/12/19 1158     Sed Rate 36 mm/hr     Urine Drug Screen - Urine, Clean Catch [[id number]]  (Abnormal) Collected:  02/12/19 1044    Specimen:  Urine, Clean Catch Updated:  02/12/19 1109     THC, Screen, Urine Negative     Phencyclidine (PCP), Urine Negative     Cocaine Screen, Urine Negative     Methamphetamine, Urine Negative     Opiate Screen  Negative     Amphetamine Screen, Urine Negative     Benzodiazepine Screen, Urine Negative     Tricyclic Antidepressants Screen Negative     Methadone Screen, Urine Negative     Barbiturates Screen, Urine Negative     Oxycodone Screen, Urine Positive     Propoxyphene Screen Negative     Buprenorphine, Screen, Urine Negative    Narrative:       Limitations of this procedure include the possibility of false positives due to interfering substances in the urine sample. Clinical data should be correlated with any questionable result. Positive results should be considered Presumptive Positive until results are confirmed with another methodology such as HPLC or GCMS.    Mycoplasma Pneumoniae Antibody, IgM [[id number]] Collected:  02/12/19 1045    Specimen:  Blood Updated:  02/12/19 1050    Influenza Antigen, Rapid - Swab, Nasopharynx [[id number]]  (Normal) Collected:  02/12/19 0939    Specimen:  Swab from Nasopharynx Updated:  02/12/19 0957     Influenza A Ag, EIA Negative     Influenza B Ag, EIA Negative    C-reactive Protein [[id number]]  (Abnormal) Collected:  02/12/19 0019    Specimen:  Blood Updated:  02/12/19 0946     C-Reactive Protein 45.00 mg/dL     Cortisol - AM [[id number]] Collected:  02/12/19 0019    Specimen:  Blood Updated:  02/12/19 0903    Blood Gas, Arterial With Co-Ox [[id number]]  (Abnormal) Collected:  02/12/19 0714    Specimen:  Arterial Blood Updated:  02/12/19 0716     Site Right Radial     Darien's Test Positive     pH, Arterial 7.377 pH units      pCO2, Arterial 39.0 mm Hg      pO2, Arterial 80.1 mm Hg      Comment: 84 Value below reference range        HCO3, Arterial 22.9 mmol/L      Base Excess, Arterial -2.1 mmol/L      Comment: 84 Value below reference range        O2 Saturation, Arterial 96.1 %      Hemoglobin, Blood Gas 11.7 g/dL      Comment: 84 Value below reference range        Hematocrit, Blood Gas 35.7 %      Comment: 84 Value below reference range        Oxyhemoglobin 93.9 %      Comment: 84  Value below reference range        Methemoglobin 0.70 %      Carboxyhemoglobin 1.6 %      A-a Gradiant -- mmHg      Comment: UNABLE TO CALCULATE        Barometric Pressure for Blood Gas 726 mmHg      Modality BiPap     FIO2 80 %      Ventilator Mode NA     Comment: Meter: [id number]     :  [id number]        Note --     pH, Temp Corrected -- pH Units      pCO2, Temperature Corrected -- mm Hg      pO2, Temperature Corrected -- mm Hg     Lactic Acid, Reflex [[id number]]  (Abnormal) Collected:  02/12/19 0413    Specimen:  Blood Updated:  02/12/19 0556     Lactate 2.8 mmol/L     Magnesium [[id number]]  (Abnormal) Collected:  02/12/19 0413    Specimen:  Blood Updated:  02/12/19 0556     Magnesium 1.2 mg/dL     Basic Metabolic Panel [[id number]]  (Abnormal) Collected:  02/12/19 0413    Specimen:  Blood Updated:  02/12/19 0555     Glucose 96 mg/dL      BUN 8 mg/dL      Creatinine 0.50 mg/dL      Sodium 122 mmol/L      Potassium 1.9 mmol/L      Chloride 91 mmol/L      CO2 22.0 mmol/L      Calcium 6.9 mg/dL      eGFR Non African Amer 127 mL/min/1.73      BUN/Creatinine Ratio 16.0     Anion Gap 10.9 mmol/L     Narrative:       GFR Normal >60  Chronic Kidney Disease <60  Kidney Failure <15    Troponin [[id number]]  (Normal) Collected:  02/12/19 0413    Specimen:  Blood Updated:  02/12/19 0550     Troponin I <0.012 ng/mL     Narrative:       Normal Patient Upper Reference Limit (URL) (99th Percentile)=0.03 ng/mL   Non-AMI Illness Reference Limit=0.03-0.11 ng/mL   AMI Confirmation=0.12 ng/mL and above    CBC Auto Differential [[id number]]  (Abnormal) Collected:  02/12/19 0413    Specimen:  Blood Updated:  02/12/19 0529     WBC 1.37 10*3/mm3      RBC 3.88 10*6/mm3      Hemoglobin 12.7 g/dL      Hematocrit 34.9 %      MCV 89.9 fL      MCH 32.7 pg      MCHC 36.4 g/dL      RDW 12.3 %      RDW-SD 40.4 fl      MPV 9.7 fL      Platelets 212 10*3/mm3      Neutrophil % 86.0 %      Lymphocyte % 8.8 %      Monocyte % 1.5 %       Eosinophil % 0.0 %      Basophil % 1.5 %      Immature Grans % 2.2 %      Neutrophils, Absolute 1.18 10*3/mm3      Lymphocytes, Absolute 0.12 10*3/mm3      Monocytes, Absolute 0.02 10*3/mm3      Eosinophils, Absolute 0.00 10*3/mm3      Basophils, Absolute 0.02 10*3/mm3      Immature Grans, Absolute 0.03 10*3/mm3      nRBC 0.0 /100 WBC     Scan Slide [[id number]] Collected:  02/12/19 0413    Specimen:  Blood Updated:  02/12/19 0529     RBC Morphology Normal     WBC Morphology Normal     Platelet Estimate Adequate    Lactic Acid, Reflex Timer (This will reflex a repeat order 3-3:15 hours after ordered.) [[id number]] Collected:  02/12/19 0019    Specimen:  Blood Updated:  02/12/19 0400     Extra Tube Hold for add-ons.     Comment: Auto resulted.       [place] Draw [[id number]] Collected:  02/11/19 2344    Specimen:  Blood Updated:  02/12/19 0151    Narrative:       The following orders were created for panel order [place] Draw.  Procedure                               Abnormality         Status                     ---------                               -----------         ------                     Light Blue Top[[id number]]                                                              Lavender Top[[id number]]                                     Final result               Gold Top - SST[[id number]]                                   Final result               Green Top (No Gel)[[id number]]                               Final result                 Please view results for these tests on the individual orders.    Gold Top - SST [[id number]] Collected:  02/12/19 0019    Specimen:  Blood Updated:  02/12/19 0130     Extra Tube Hold for add-ons.     Comment: Auto resulted.       Procalcitonin [[id number]]  (Abnormal) Collected:  02/12/19 0019    Specimen:  Blood Updated:  02/12/19 0124     Procalcitonin 151.29 ng/mL     Narrative:       As a Marker for Sepsis (Non-Neonates):   1. <0.5 ng/mL represents a low risk of severe sepsis  and/or septic shock.  2. >2 ng/mL represents a high risk of severe sepsis and/or septic shock.    As a Marker for Lower Respiratory Tract Infections that require antibiotic therapy:    PCT on Admission     Antibiotic Therapy       6-12 Hrs later  > 0.5                Strongly Recommended             >0.25 - <0.5         Recommended  0.1 - 0.25           Discouraged              Remeasure/reassess PCT  <0.1                 Strongly Discouraged     Remeasure/reassess PCT                     PCT values of < 0.5 ng/mL do not exclude an infection, because localized infections (without systemic signs) may be associated with such low concentrations, or a systemic infection in its initial stages (< 6 hours). Furthermore, increased PCT can occur without infection. PCT concentrations between 0.5 and 2.0 ng/mL should be interpreted taking into account the patient's history. It is recommended to retest PCT within 6-24 hours if any concentrations < 2 ng/mL are obtained.    Lactic Acid, Plasma [[id number]]  (Abnormal) Collected:  02/12/19 0019    Specimen:  Blood Updated:  02/12/19 0047     Lactate 6.1 mmol/L     Lavender Top [[id number]] Collected:  02/11/19 2344    Specimen:  Blood Updated:  02/12/19 0045     Extra Tube hold for add-on     Comment: Auto resulted       Green Top (No Gel) [[id number]] Collected:  02/11/19 2344    Specimen:  Blood Updated:  02/12/19 0045     Extra Tube Hold for add-ons.     Comment: Auto resulted.       Comprehensive Metabolic Panel [[id number]]  (Abnormal) Collected:  02/11/19 2344    Specimen:  Blood Updated:  02/12/19 0032     Glucose 96 mg/dL      BUN 11 mg/dL      Creatinine 0.80 mg/dL      Sodium 119 mmol/L      Potassium 2.1 mmol/L      Chloride 80 mmol/L      CO2 20.0 mmol/L      Calcium 7.7 mg/dL      Total Protein 6.0 g/dL      Albumin 2.90 g/dL      ALT (SGPT) 17 U/L      AST (SGOT) 74 U/L      Alkaline Phosphatase 147 U/L      Total Bilirubin 0.7 mg/dL      eGFR Non  Amer 74  mL/min/1.73      Globulin 3.1 gm/dL      A/G Ratio 0.9 g/dL      BUN/Creatinine Ratio 13.8     Anion Gap 21.1 mmol/L     Narrative:       GFR Normal >60  Chronic Kidney Disease <60  Kidney Failure <15    BNP [[id number]]  (Abnormal) Collected:  02/11/19 2344    Specimen:  Blood Updated:  02/12/19 0032     proBNP 1,870.0 pg/mL     CBC & Differential [[id number]] Collected:  02/11/19 2344    Specimen:  Blood Updated:  02/12/19 0018    Narrative:       The following orders were created for panel order CBC & Differential.  Procedure                               Abnormality         Status                     ---------                               -----------         ------                     Scan Slide[[id number]]                                       Final result               CBC Auto Differential[[id number]]        Abnormal            Final result                 Please view results for these tests on the individual orders.    CBC Auto Differential [[id number]]  (Abnormal) Collected:  02/11/19 2344    Specimen:  Blood Updated:  02/12/19 0018     WBC 5.68 10*3/mm3      RBC 3.59 10*6/mm3      Hemoglobin 12.2 g/dL      Hematocrit 34.6 %      MCV 96.4 fL      MCH 34.0 pg      MCHC 35.3 g/dL      RDW 12.8 %      RDW-SD 45.0 fl      MPV 9.4 fL      Platelets 270 10*3/mm3      Neutrophil % 75.9 %      Lymphocyte % 19.0 %      Monocyte % 2.1 %      Eosinophil % 0.2 %      Basophil % 1.4 %      Immature Grans % 1.4 %      Neutrophils, Absolute 4.31 10*3/mm3      Lymphocytes, Absolute 1.08 10*3/mm3      Monocytes, Absolute 0.12 10*3/mm3      Eosinophils, Absolute 0.01 10*3/mm3      Basophils, Absolute 0.08 10*3/mm3      Immature Grans, Absolute 0.08 10*3/mm3      nRBC 0.0 /100 WBC     Scan Slide [[id number]] Collected:  02/11/19 2344    Specimen:  Blood Updated:  02/12/19 0018     RBC Morphology Normal     WBC Morphology Normal     Platelet Estimate Adequate    Troponin [[id number]]  (Normal) Collected:  02/11/19 2344    Specimen:   Blood Updated:  02/12/19 0015     Troponin I <0.012 ng/mL     Narrative:       Normal Patient Upper Reference Limit (URL) (99th Percentile)=0.03 ng/mL   Non-AMI Illness Reference Limit=0.03-0.11 ng/mL   AMI Confirmation=0.12 ng/mL and above    Blood Gas, Arterial With Co-Ox [[id number]]  (Abnormal) Collected:  02/11/19 2348    Specimen:  Arterial Blood Updated:  02/11/19 2349     Site Left Radial     Darien's Test Positive     pH, Arterial 7.310 pH units      Comment: 84 Value below reference range        pCO2, Arterial 43.5 mm Hg      pO2, Arterial 60.4 mm Hg      Comment: 84 Value below reference range        HCO3, Arterial 21.9 mmol/L      Base Excess, Arterial -4.3 mmol/L      Comment: 84 Value below reference range        O2 Saturation, Arterial 88.4 %      Comment: 84 Value below reference range        Hemoglobin, Blood Gas 12.8 g/dL      Hematocrit, Blood Gas 39.3 %      Oxyhemoglobin 85.0 %      Comment: 84 Value below reference range        Methemoglobin 0.60 %      Carboxyhemoglobin 3.3 %      A-a Gradiant -- mmHg      Comment: UNABLE TO CALCULATE        Barometric Pressure for Blood Gas 730 mmHg      Modality BiPap     FIO2 50 %      Ventilator Mode BiPAP     Set [facility] Resp Rate 14.0     IPAP 16     Comment: Meter: [id number]     :  [id number]        EPAP 8     Note --     pH, Temp Corrected -- pH Units      pCO2, Temperature Corrected -- mm Hg      pO2, Temperature Corrected -- mm Hg           Pertinent Radiology Results:    Imaging Results (all)     Procedure Component Value Units Date/Time    XR Chest 1 View [[id number]] Updated:  02/13/19 0619    XR Abdomen KUB [[id number]] Collected:  02/12/19 1410     Updated:  02/12/19 1413    Narrative:       PROCEDURE: XR ABDOMEN KUB-     HISTORY: NG TUBE PLACEMENT; J96.01-Acute respiratory failure with  hypoxia; A41.9-Sepsis, unspecified organism; J18.9-Pneumonia,  unspecified organism; E87.8-Erny-iuqmyvoibx and hyponatremia;  E87.6-Hypokalemia      COMPARISON: None.     FINDINGS: An AP view of the abdomen and pelvis demonstrates an  unremarkable bowel gas pattern with no evidence of obstruction. A  nasogastric tube is in place with the tip in the body of the stomach.             Impression:       Nasogastric tube in place with the tip in the body of the  stomach.             This report was finalized on 2/12/2019 2:11 PM by Tacos Day M.D..    XR Chest 1 View [[id number]] Collected:  02/12/19 1410     Updated:  02/12/19 1413    Narrative:       PROCEDURE: XR CHEST 1 VW-     HISTORY: Confirm ET Tube Placement; J96.01-Acute respiratory failure  with hypoxia; A41.9-Sepsis, unspecified organism; J18.9-Pneumonia,  unspecified organism; E87.8-Klnk-wxdwfzzfxe and hyponatremia;  E87.6-Hypokalemia     COMPARISON: February 11, 2019.     FINDINGS: An endotracheal tube is in place and is well-positioned with  the tip above the asaf. A nasogastric tube extends below the diaphragm  and is present in the body the stomach. The heart is normal in size. The  mediastinum is unremarkable. There is diffuse bilateral airspace disease  with worsening bibasilar opacities. There is no pneumothorax.  There are  no acute osseous abnormalities.           Impression:       Support tubes appropriately positioned.     Continued followup is recommended.     This report was finalized on 2/12/2019 2:10 PM by Tacos Day M.D..    XR Chest 1 View [[id number]] Collected:  02/12/19 0959     Updated:  02/12/19 1236    Narrative:       PROCEDURE: XR CHEST 1 VW-     HISTORY: soa     COMPARISON: December 2018.     FINDINGS: The heart is normal in size. The mediastinum is unremarkable.  There are diffuse bilateral airspace disease superimposed on chronic  changes. This may be due to edema or pneumonia. There is no  pneumothorax.             Impression:       Diffuse bilateral airspace disease superimposed on chronic  changes.     Continued follow-up is recommended.     Images were  reviewed, interpreted, and dictated by Dr. Tacos Day M.D.  Transcribed by Елнеа Barnett PA-C.     This report was finalized on 2/12/2019 12:34 PM by Tacos Day M.D..          Condition on Discharge:  Stable        Code status during the hospital stay:        Discharge Disposition:    [facility] ([place] - [facility]) w/Planned Readmission    Discharge Medication:       Discharge Medications      New Medications      Instructions Start Date   acetaminophen 325 MG tablet  Commonly known as:  TYLENOL   650 mg, Oral, Every 4 Hours PRN      azithromycin   500 mg, Intravenous, Every 24 Hours      budesonide 0.5 MG/2ML nebulizer solution  Commonly known as:  PULMICORT   0.5 mg, Nebulization, 2 Times Daily - RT      enoxaparin 40 MG/0.4ML solution syringe  Commonly known as:  LOVENOX   40 mg, Subcutaneous, Every 24 Hours      famotidine 10 MG/ML solution injection  Commonly known as:  PEPCID   20 mg, Intravenous, Every 12 Hours Scheduled      gabapentin 400 MG capsule  Commonly known as:  NEURONTIN  Replaces:  gabapentin 800 MG tablet   400 mg, Oral, Every 8 Hours Scheduled      magnesium oxide 400 (241.3 Mg) MG tablet tablet  Commonly known as:  MAGOX   400 mg, Oral, 2 Times Daily      methylPREDNISolone sodium succinate 125 MG injection  Commonly known as:  SOLU-Medrol   80 mg, Intravenous, Every 8 Hours      midazolam 50 mg in sodium chloride 0.9 % 90 mL   2 mg/hr, Intravenous, Titrated      piperacillin-tazobactam 4-0.5 GM/100ML solution IVPB  Commonly known as:  ZOSYN   4.5 g, Intravenous, Every 8 Hours      sodium chloride 0.9 % solution 242 mL with norepinephrine 1 MG/ML solution 8,000 mcg   0.02-0.3 mcg/kg/min, Intravenous, Titrated      sodium chloride 0.9 % solution 245 mL with phenylephrine 10 MG/ML solution 50 mg   0.5-3 mcg/kg/min, Intravenous, Titrated      vancomycin (VANCOCIN) IVPB 500 mg in 100 mL NS   500 mg, Intravenous, Every 12 Hours         Continue These Medications       Instructions Start Date   oxyCODONE-acetaminophen  MG per tablet  Commonly known as:  PERCOCET    tablets, Oral, Every 6 Hours PRN         Stop These Medications    albuterol sulfate  (90 Base) MCG/ACT inhaler  Commonly known as:  PROVENTIL HFA;VENTOLIN HFA;PROAIR HFA     amLODIPine 10 MG tablet  Commonly known as:  NORVASC     gabapentin 800 MG tablet  Commonly known as:  NEURONTIN  Replaced by:  gabapentin 400 MG capsule     KLOR-CON 10 MEQ CR tablet  Generic drug:  potassium chloride     lisinopril 5 MG tablet  Commonly known as:  PRINIVIL,ZESTRIL     meloxicam 15 MG tablet  Commonly known as:  MOBIC     methocarbamol 500 MG tablet  Commonly known as:  ROBAXIN     nicotine 21 MG/24HR patch  Commonly known as:  NICODERM CQ     predniSONE 10 MG tablet  Commonly known as:  DELTASONE     SYMBICORT 160-4.5 MCG/ACT inhaler  Generic drug:  budesonide-formoterol     traMADol 50 MG tablet  Commonly known as:  ULTRAM            Discharge Diet:     Diet Instructions     Diet: Nothing By Mouth      Discharge Diet:  Nothing By Mouth          Activity at Discharge:     Activity Instructions     Other Instructions (Specify)      Bedrest          Follow-up Appointments:    No future appointments.      Test Results Pending at Discharge:     Order Current Status    Cortisol - AM In process    Legionella Antigen, Urine - Urine, Urine, Clean Catch In process    Mycoplasma Pneumoniae Antibody, IgM In process    S. Pneumo Ag Urine or CSF - Urine, Urine, Clean Catch In process    Blood Culture - Blood, Arm, Left Preliminary result    Blood Culture - Blood, Arm, Right Preliminary result    Respiratory Culture - Sputum, NT Suction Preliminary result             Radha Fields DO  02/13/19  9:11 AM      Medication risks and benefits were discussed in great detail. The patient reported being satisfied with the current treatment plan and the care delivered while hospitalized.     Time:  I spent 35 minutes preparing  discharge counseling and teaching.

## 2019-02-13 NOTE — PROGRESS NOTES
[facility]    Name:  Ce Reynolds   Age:  58 y.o.  Sex:  female  :  1960  MRN:  [id number]   Visit Number:  [id number]  Admission Date:  2019  Date Of Service:  19  Primary Care Physician:  Lyssa Luciano APRN    Patient was seen and examined couple of times in the night. Pertinent laboratory and radiology data were reviewed.  Unfortunately, patient continues to have hypoxia and hypotension.  She was initially given 500 mL of normal saline bolus.  She was also noted to have hypoglycemia and was given 50 mL of dextrose 50% intravenously.    Patient continued to have worsening hypoxia despite being on 100% FiO2 on the ventilator with a PEEP of 8 mmHg.  Her saturations are in the upper 80s.  She does have cold extremities with a systolic blood pressure in the 70s.  She was started on Levophed drip with improvement in her systolic blood pressures into the upper 80s.  No significant skin mottling noted.    Patient continues to have hypoglycemia in the 60s and was given another dextrose 50 mL intravenously.  Patient has been started on dextrose normal saline drip at night.  She also has a temperature of 102.8 and was given Tylenol rectally.    Vital signs:    Vital Signs (last 24 hours)        0700  -   0659  0700  -   0625   Most Recent    Temp (°F) 97.3 -  98    97.7 -  (!)102.8     (!) 102.8 (39.3)  Comment: md notified    Heart Rate 96 -  112    108 -  (!)129     (!) 121    Resp 18 -  28    19 -  26     26    BP (!)84/67 -  107/79    (!)64/49 -  132/86     (!) 85/54    SpO2 (%) (!)69 -  99    (!)86 -  100     90        Unfortunately, patient seems to be going into full-blown ARDS.  She does not have any significant airway resistance and peak pressures are in the 30s.    She also has a potassium of 6.5 but her creatinine is normal at 0.9.  She did get oral potassium yesterday.  I will give her a dose of calcium  gluconate.    There is no history of congestive heart failure but due to sepsis she may have developed left ventricular dysfunction.  We will get a 2-D echocardiogram to assess her left ventricular function.    Patient does seem to have worsening ARDS due to bilateral pneumonia.  She may benefit from extracorporeal membrane oxygenation.  We will discuss this with Dr. Perales this morning.  If she remains hemodynamically stable for transfer, she may benefit from going to tertiary care facility.    Alfonso Wray MD  02/13/19  6:25 AM    Dictated utilizing Dragon dictation.

## 2019-02-13 NOTE — PROGRESS NOTES
Adult Nutrition  Assessment/PES    Patient Name:  Ce Reynolds  YOB: 1960  MRN: [id number]  Admit Date:  2/11/2019    Assessment Date:  2/13/2019    Comments:  RD aware of pt's upcoming transfer to [facility]. EN feedings are being held for transfer per NSG.     RD to follow pt and available PRN.        Reason for Assessment     Row Name 02/13/19 1003          Reason for Assessment    Reason For Assessment  follow-up protocol;TF/PN;nurse/nurse practitioner consult     Diagnosis  pulmonary disease;metabolic state COPD, Met. enceph., Acute resp. failure     Identified At Risk by Screening Criteria  MST SCORE 2+;unintentional loss of 10 lbs or more in the past 2 mos;BMI LACE           Anthropometrics     Row Name 02/13/19 0425          Anthropometrics    Weight  42.8 kg (94 lb 6.4 oz)         Labs/Tests/Procedures/Meds     Row Name 02/13/19 1003          Labs/Procedures/Meds    Lab Results Reviewed  reviewed, pertinent     Lab Results Comments  Low: Na+, Alb High: K+, Cl-, Gluc, Procalcitonin        Medications    Pertinent Medications Reviewed  reviewed             Nutrition Prescription Ordered     Row Name 02/13/19 1005          Nutrition Prescription PO    Current PO Diet  NPO        Nutrition Prescription EN    Product  Nutren 2.0 (TwoCal)     TF Delivery Method  Continuous     Continuous TF Goal Rate (mL/hr)  23 mL/hr     Continuous TF Current Rate (mL/hr)  -- Held for transfer     Continuous TF Goal Volume (mL)  506 mL     Water flush (mL)   165 mL     Water Flush Frequency  Other (comment) Every 6 hours         Evaluation of Received Nutrient/Fluid Intake     Row Name 02/13/19 1009          EN Evaluation    Number of Days EN Intake Evaluated  Insufficient Data EN held for pt transfer     TF Changes  Held               Problem/Interventions:  Problem 1     Row Name 02/13/19 1009          Nutrition Diagnoses Problem 1    Problem 1  Underweight     Etiology (related to)  Factors Affecting Nutrition      Appetite  Poor     Signs/Symptoms (evidenced by)  BMI;% IBW;% UBW     BMI  19 - 19.9     Percent (%) IBW  87 %     Percent (%) UBW  --           Problem 3     Row Name 02/13/19 1011          Nutrition Diagnoses Problem 3    Problem 3  Needs Alternate     Etiology (related to)  Medical Diagnosis     Pulmonary/Critical Care  Ventilator     Signs/Symptoms (evidenced by)  PO diet not tolerated     Resolved?  Yes NG tube in place, feedings held due to transfer orders             Intervention Goal     Row Name 02/13/19 1011          Intervention Goal    General  Meet nutritional needs for age/condition;Nutrition support treatment     PO  Other (comment) Remain NPO with EN orders     TF/PN  Inititiate TF/PN     Transition  TF to PO     Weight  Appropriate weight gain         Nutrition Intervention     Row Name 02/13/19 1012          Nutrition Intervention    RD/Tech Action  Follow Tx progress;Recommend/ordered     Recommended/Ordered  EN         Nutrition Prescription     Row Name 02/13/19 1012          Nutrition Prescription PO    PO Prescription  Other (comment) Remain NPO while pt is to receive EN     New PO Prescription Ordered?  No, recommended        Other Orders    Obtain Weight  Daily     Obtain Weight Ordered?  No, recommended     Supplement  Vitamin mineral supplement     Supplement Ordered?  No, recommended     Other  Continue to monitor electrolytes         Education/Evaluation     Row Name 02/13/19 1013          Education    Education  --     Please explain  Patient intubated        Monitor/Evaluation    Monitor  Per protocol;I&O;Pertinent labs;TF delivery/tolerance;Weight;Skin status           Electronically signed by:  Ce Jane RD  02/13/19 10:17 AM

## 2019-02-13 NOTE — PROGRESS NOTES
"Nephrology Progress Note.    LOS: 1 day    Patient Care Team:  Lyssa Luciano APRN as PCP - General  Sherry Lake MD as Consulting Physician (General Surgery)    Chief Complaint:    Chief Complaint   Patient presents with   • Shortness of Breath       Subjective:       Follow-up for Hyponatremia and related issues.    Interval History:     Patient Complaints: none    Patient seen and examined this morning.  Events from last night noted.  Patient is intubated sedated no interaction. Plan to transfer patient to [facility] for higher level of care.      Review of Systems:    Patient is intubated and sedated no meaningful review of system is possible.    Objective:    Vital Signs  BP (!) 85/54   Pulse (!) 124   Temp (!) 102.8 °F (39.3 °C) (Oral) Comment: md notified  Resp 26   Ht 149.9 cm (59\")   Wt 42.8 kg (94 lb 6.4 oz)   SpO2 95%   BMI 19.07 kg/m²       No intake/output data recorded.    Intake/Output Summary (Last 24 hours) at 2/13/2019 0734  Last data filed at 2/13/2019 0537  Gross per 24 hour   Intake 5501 ml   Output 485 ml   Net 5016 ml       Physical Exam:    General Appearance: intubated in no acute distress  HEENT: Oral mucosa dry, extra occular movements intact. Sclera clear.  Skin: Warm and dry  Neck: supple, + JVD, trachea midline  Lungs:Chest shape is normal. Breath sounds heard bilaterally equally, she has fine crackles all over the chest.  Heart: regular rate and rhythm. normal S1 and S2, no S3, no rub, peripheral pulses weak but palpable.  Abdomen: Obese, soft, non-tender,  present bowel sounds to auscultation  : no palpable bladder.  Extremities: Trace to 1+ edema, no cyanosis or clubbing.   Neuro: unable to examine secondary to sedation.     Results Review:    Results from last 7 days   Lab Units 02/13/19  0422 02/12/19  1612 02/12/19  0413 02/11/19  2344   SODIUM mmol/L 132* 129* 122* 119*   POTASSIUM mmol/L 6.5* 3.5 1.9* 2.1*   CHLORIDE mmol/L 111* 103 91* 80*   CO2 mmol/L " 15.0* 17.0* 22.0* 20.0*   BUN mg/dL 5* 4* 8 11   CREATININE mg/dL 0.90 0.50* 0.50* 0.80   CALCIUM mg/dL 6.6* 7.3* 6.9* 7.7*   BILIRUBIN mg/dL 0.7  --   --  0.7   ALK PHOS U/L 98  --   --  147*   ALT (SGPT) U/L 36  --   --  17   AST (SGOT) U/L 105*  --   --  74*   GLUCOSE mg/dL 226* 76 96 96       Estimated Creatinine Clearance: 46.6 mL/min (by C-G formula based on SCr of 0.9 mg/dL).    Results from last 7 days   Lab Units 02/13/19  0422 02/12/19  0413   MAGNESIUM mg/dL 1.9 1.2*             Results from last 7 days   Lab Units 02/13/19  0422 02/12/19  0413 02/11/19  2344   WBC 10*3/mm3 16.92* 1.37* 5.68   HEMOGLOBIN g/dL 9.9* 12.7 12.2   PLATELETS 10*3/mm3 172 212 270               Imaging Results (last 24 hours)     Procedure Component Value Units Date/Time    XR Chest 1 View [[id number]] Updated:  02/13/19 0619    XR Abdomen KUB [[id number]] Collected:  02/12/19 1410     Updated:  02/12/19 1413    Narrative:       PROCEDURE: XR ABDOMEN KUB-     HISTORY: NG TUBE PLACEMENT; J96.01-Acute respiratory failure with  hypoxia; A41.9-Sepsis, unspecified organism; J18.9-Pneumonia,  unspecified organism; E87.5-Ayyf-chgkhgopyn and hyponatremia;  E87.6-Hypokalemia     COMPARISON: None.     FINDINGS: An AP view of the abdomen and pelvis demonstrates an  unremarkable bowel gas pattern with no evidence of obstruction. A  nasogastric tube is in place with the tip in the body of the stomach.             Impression:       Nasogastric tube in place with the tip in the body of the  stomach.             This report was finalized on 2/12/2019 2:11 PM by Tacos Day M.D..    XR Chest 1 View [[id number]] Collected:  02/12/19 1410     Updated:  02/12/19 1413    Narrative:       PROCEDURE: XR CHEST 1 VW-     HISTORY: Confirm ET Tube Placement; J96.01-Acute respiratory failure  with hypoxia; A41.9-Sepsis, unspecified organism; J18.9-Pneumonia,  unspecified organism; E87.4-Zavq-tykcjybdgl and hyponatremia;  E87.6-Hypokalemia      COMPARISON: February 11, 2019.     FINDINGS: An endotracheal tube is in place and is well-positioned with  the tip above the asaf. A nasogastric tube extends below the diaphragm  and is present in the body the stomach. The heart is normal in size. The  mediastinum is unremarkable. There is diffuse bilateral airspace disease  with worsening bibasilar opacities. There is no pneumothorax.  There are  no acute osseous abnormalities.           Impression:       Support tubes appropriately positioned.     Continued followup is recommended.     This report was finalized on 2/12/2019 2:10 PM by Tacos Day M.D..    XR Chest 1 View [[id number]] Collected:  02/12/19 0959     Updated:  02/12/19 1236    Narrative:       PROCEDURE: XR CHEST 1 VW-     HISTORY: soa     COMPARISON: December 2018.     FINDINGS: The heart is normal in size. The mediastinum is unremarkable.  There are diffuse bilateral airspace disease superimposed on chronic  changes. This may be due to edema or pneumonia. There is no  pneumothorax.             Impression:       Diffuse bilateral airspace disease superimposed on chronic  changes.     Continued follow-up is recommended.     Images were reviewed, interpreted, and dictated by Dr. Tacos Day M.D.  Transcribed by Елена Barnett PA-C.     This report was finalized on 2/12/2019 12:34 PM by Tacos Day M.D..          azithromycin 500 mg Intravenous Q24H   budesonide 0.5 mg Nebulization BID - RT   calcium gluconate 1 g Intravenous Once   chlorhexidine 15 mL Mouth/Throat Q12H   dextrose 50 mL Intravenous Once   enoxaparin 40 mg Subcutaneous Q24H   famotidine 20 mg Intravenous Q12H   gabapentin 400 mg Oral Q8H   guaiFENesin 200 mg Oral Q4H   ipratropium-albuterol 3 mL Nebulization Q6H - RT   lactobacillus acidophilus 1 capsule Oral BID   magnesium oxide 400 mg Oral BID   methylPREDNISolone sodium succinate 80 mg Intravenous Q8H   nicotine 1 patch Transdermal Q24H    piperacillin-tazobactam 4.5 g Intravenous Q8H   sodium chloride 10 mL Intravenous Q12H   sodium chloride 10 mL Intravenous Q12H   sodium chloride 10 mL Intravenous Q12H   sodium chloride 3 mL Intravenous Q12H   vancomycin 500 mg Intravenous Q12H       dextrose 5 % and sodium chloride 0.9 % with KCl 20 mEq 100 mL/hr Last Rate: 100 mL/hr (02/13/19 0058)   midazolam (VERSED) infusion 2 mg/hr Last Rate: Stopped (02/13/19 0532)   norepinephrine 0.02-0.3 mcg/kg/min Last Rate: 0.25 mcg/kg/min (02/13/19 0610)   Pharmacy Consult     [START ON 2/14/2019] Pharmacy Consult         Medication Review:   Current Facility-Administered Medications   Medication Dose Route Frequency Provider Last Rate Last Dose   • acetaminophen (TYLENOL) suppository 650 mg  650 mg Rectal Q6H PRN Alfonso Wray MD   650 mg at 02/13/19 0635   • acetaminophen (TYLENOL) tablet 650 mg  650 mg Oral Q4H PRN Alfonso Wray MD       • AZITHROMYCIN 500 MG/250 ML 0.9% NS IVPB (vial-mate)  500 mg Intravenous Q24H Alfonso Wray MD   500 mg at 02/13/19 0403   • budesonide (PULMICORT) nebulizer solution 0.5 mg  0.5 mg Nebulization BID - RT Alfonso Wray MD   0.5 mg at 02/13/19 0710   • calcium gluconate injection 1 g  1 g Intravenous Once Alfonso Wray MD       • chlorhexidine (PERIDEX) 0.12 % solution 15 mL  15 mL Mouth/Throat Q12H Radha Fields DO   15 mL at 02/12/19 2132   • dextrose (D50W) 25 g/ 50mL Intravenous Solution 50 mL  50 mL Intravenous Once Alfonso Wray MD       • dextrose 5 % and sodium chloride 0.9 % with KCl 20 mEq/L infusion  100 mL/hr Intravenous Continuous Alfonso Wray  mL/hr at 02/13/19 0058 100 mL/hr at 02/13/19 0058   • enoxaparin (LOVENOX) syringe 40 mg  40 mg Subcutaneous Q24H Alfonso Wray MD   40 mg at 02/13/19 0355   • famotidine (PEPCID) injection 20 mg  20 mg Intravenous Q12H Rahda Fields DO   20 mg at 02/12/19 2133   • gabapentin (NEURONTIN) capsule 400 mg  400 mg Oral Q8H Alfonso Wray MD   400 mg at 02/12/19 2132   •  guaiFENesin (ROBITUSSIN) 100 MG/5ML oral solution 200 mg  200 mg Oral Q4H Alfonso Wray MD   200 mg at 02/13/19 0157   • ipratropium-albuterol (DUO-NEB) nebulizer solution 3 mL  3 mL Nebulization Q4H PRN Alfonso Wray MD       • ipratropium-albuterol (DUO-NEB) nebulizer solution 3 mL  3 mL Nebulization Q6H - RT Alfonso Wray MD   3 mL at 02/13/19 0710   • lactobacillus acidophilus (RISAQUAD) capsule 1 capsule  1 capsule Oral BID Alfonso Wray MD   1 capsule at 02/12/19 2132   • magnesium oxide (MAGOX) tablet 400 mg  400 mg Oral BID Ben Ibarra MD, FASN   400 mg at 02/12/19 2132   • methylPREDNISolone sodium succinate (SOLU-Medrol) injection 80 mg  80 mg Intravenous Q8H Alfonso Wray MD   80 mg at 02/13/19 0012   • midazolam (VERSED) 0.5 mg/mL in sodium chloride 0.9 % 100 mL infusion  2 mg/hr Intravenous Titrated Hao Perales MD   Stopped at 02/13/19 0532   • Morphine sulfate (PF) injection 2 mg  2 mg Intravenous Q4H PRN Alfonso Wray MD       • nicotine (NICODERM CQ) 21 MG/24HR patch 1 patch  1 patch Transdermal Q24H Alfonso Wray MD   1 patch at 02/12/19 1303   • norepinephrine (LEVOPHED) 8,000 mcg in sodium chloride 0.9 % 250 mL (32 mcg/mL) infusion  0.02-0.3 mcg/kg/min Intravenous Titrated Alfonso Wray MD 20.1 mL/hr at 02/13/19 0610 0.25 mcg/kg/min at 02/13/19 0610   • ondansetron (ZOFRAN) injection 4 mg  4 mg Intravenous Q6H PRN Alfonso Wray MD       • oxyCODONE-acetaminophen (PERCOCET)  MG per tablet 1 tablet  1 tablet Oral Q6H PRN Alfonso Wray MD   1 tablet at 02/12/19 1602   • Pharmacy Consult   Does not apply Continuous PRN Alfonso Wray MD       • piperacillin-tazobactam (ZOSYN) 4.5 g in iso-osmotic dextrose 100 mL IVPB (premix)  4.5 g Intravenous Q8H Alfonso Wray MD   4.5 g at 02/13/19 0012   • sodium chloride 0.9 % flush 10 mL  10 mL Intravenous PRN Jeremi Miranda Jr., PA-C       • sodium chloride 0.9 % flush 10 mL  10 mL Intravenous Q12H Hao Perales MD       • sodium  chloride 0.9 % flush 10 mL  10 mL Intravenous Q12H Hao Perales MD       • sodium chloride 0.9 % flush 10 mL  10 mL Intravenous Q12H Hao Perales MD       • sodium chloride 0.9 % flush 10 mL  10 mL Intravenous PRN Hao Perales MD       • sodium chloride 0.9 % flush 20 mL  20 mL Intravenous PRN Hao Perales MD       • sodium chloride 0.9 % flush 3 mL  3 mL Intravenous Q12H Alfonso Wray MD   3 mL at 02/12/19 0833   • sodium chloride 0.9 % flush 3-10 mL  3-10 mL Intravenous PRN Alfonso Wray MD       • vancomycin (VANCOCIN) IVPB 500 mg in 100 mL NS  500 mg Intravenous Q12H Alfonso Wray MD   500 mg at 02/13/19 0403   • [START ON 2/14/2019] vancomycin level 02/14 0230 hold next dose if level >20   Does not apply Continuous PRN Alfonso Wray MD           Assessment/Plan:  1.   Hyponatremia: significantly improved continue with the normal saline.  2.   Hypokalemia: currently hyperkalemicwill have to be drawn, it has not been treated yet.  3.   Acute on chronic respiratory failure with hypoxia (CMS/HCC): intubated.  4.   COPD exacerbation (CMS/HCC): treated  5.   Pneumonia of both lungs due to infectious organism  6.   Acute metabolic encephalopathy  7.   Tobacco use  8.   Chronic low back pain  9.   Hard of hearing     Plan:  · She will need further workup, once her acute issues are over.  · Recheck potassium.  · Continue with the normal saline likely will help with the serum sodium.  · IV magnesium has been given will go ahead and start the oral magnesium as well.  · Surveillance labs.  · Details were also discussed with the hospitalist service as well as Dr. Perales.  · Further recommendations will depend on clinical course of the patient during the current hospitalization.    · I also discussed the details with the nursing staff.  · Rest as ordered.      Ben Ibarra MD, FASN  02/13/19  7:34 AM    Dictated utilizing Dragon dictation.

## 2019-02-13 NOTE — PROGRESS NOTES
Case Management Discharge Note    Final Note: Pt transferring to [facility] via [facility]'s ECMO team transportation.     Destination - Selection Complete      Service Provider Request Status Selected Services Address Phone Number Fax Number    [facility] Selected [facility] [address] [place] [phone] -- --      Durable Medical Equipment      No service has been selected for the patient.      Dialysis/Infusion      No service has been selected for the patient.      Home Medical Care      No service has been selected for the patient.      Community Resources      No service has been selected for the patient.        Ambulance: Other(ECMO team [facility])    Final Discharge Disposition Code: 02 - short term \[facility]\"" for [facility]

## 2019-02-13 NOTE — PROGRESS NOTES
Continued Stay Note   Christiano     Patient Name: Ce Reynolds  MRN: [id number]  Today's Date: 2/13/2019    Admit Date: 2/11/2019    Discharge Plan     Row Name 02/13/19 1010       Plan    Plan Comments Pt with unfortunate decline. Requiring intubation with sedation and transfer to [facility] for further care. ECMO team from [facility] on route to get pt, per nurseJacqui. No further needs from  at this time. Will be available should needs arise.         Discharge Codes    No documentation.       Expected Discharge Date and Time     Expected Discharge Date Expected Discharge Time    Feb 13, 2019             TONY Franco  02/13/19  10:13 AM

## 2019-02-14 LAB
BACTERIA FLD CULT: ABNORMAL
L PNEUMO1 AG UR QL IA: NEGATIVE
Lab: ABNORMAL
ORGANISM ID: ABNORMAL
S PNEUM AG SPEC QL LA: POSITIVE
SPECIMEN SOURCE: ABNORMAL

## 2019-02-15 LAB
BACTERIA SPEC RESP CULT: ABNORMAL
BACTERIA SPEC RESP CULT: ABNORMAL

## 2019-02-17 LAB
BACTERIA SPEC AEROBE CULT: NORMAL
BACTERIA SPEC AEROBE CULT: NORMAL

## (undated) DEVICE — ENDOGATOR AUXILIARY WATER JET CONNECTOR: Brand: ENDOGATOR

## (undated) DEVICE — SYR LUER SLPTP 50ML

## (undated) DEVICE — PAD GRND REM POLYHESIVE A/ DISP

## (undated) DEVICE — Device

## (undated) DEVICE — KT ORCA VLV SXN AIR/H2O W/SEAL 1P/U STRL

## (undated) DEVICE — TP SXN YANKR BULB STRL

## (undated) DEVICE — GLV SURG TRIUMPH MICRO PF LTX 7.5 STRL

## (undated) DEVICE — GLV SURG SENSICARE W/ALOE PF LF 7.5 STRL

## (undated) DEVICE — JELLY,LUBE,STERILE,FLIP TOP,TUBE,2-OZ: Brand: MEDLINE

## (undated) DEVICE — MEDI-VAC NON-CONDUCTIVE SUCTION TUBING: Brand: CARDINAL HEALTH

## (undated) DEVICE — CONMED SCOPE SAVER BITE BLOCK, 20X27 MM: Brand: SCOPE SAVER